# Patient Record
Sex: MALE | Race: WHITE | NOT HISPANIC OR LATINO | Employment: OTHER | URBAN - METROPOLITAN AREA
[De-identification: names, ages, dates, MRNs, and addresses within clinical notes are randomized per-mention and may not be internally consistent; named-entity substitution may affect disease eponyms.]

---

## 2017-02-06 ENCOUNTER — TELEPHONE (OUTPATIENT)
Dept: FAMILY MEDICINE | Facility: CLINIC | Age: 72
End: 2017-02-06

## 2017-02-06 DIAGNOSIS — I10 BENIGN ESSENTIAL HYPERTENSION: ICD-10-CM

## 2017-02-06 DIAGNOSIS — N18.1 CKD (CHRONIC KIDNEY DISEASE) STAGE 1, GFR 90 ML/MIN OR GREATER: Primary | ICD-10-CM

## 2017-02-07 RX ORDER — AMLODIPINE BESYLATE 5 MG/1
10 TABLET ORAL DAILY
Qty: 180 TABLET | Refills: 1 | Status: SHIPPED | OUTPATIENT
Start: 2017-02-07 | End: 2017-05-08

## 2017-02-07 RX ORDER — LISINOPRIL 40 MG/1
80 TABLET ORAL DAILY
Qty: 180 TABLET | Refills: 1 | Status: SHIPPED | OUTPATIENT
Start: 2017-02-07 | End: 2017-07-21

## 2017-03-14 ENCOUNTER — MEDICAL CORRESPONDENCE (OUTPATIENT)
Dept: HEALTH INFORMATION MANAGEMENT | Facility: CLINIC | Age: 72
End: 2017-03-14

## 2017-03-14 ENCOUNTER — TRANSFERRED RECORDS (OUTPATIENT)
Dept: HEALTH INFORMATION MANAGEMENT | Facility: CLINIC | Age: 72
End: 2017-03-14

## 2017-03-17 ENCOUNTER — TRANSFERRED RECORDS (OUTPATIENT)
Dept: HEALTH INFORMATION MANAGEMENT | Facility: CLINIC | Age: 72
End: 2017-03-17

## 2017-03-27 ENCOUNTER — TELEPHONE (OUTPATIENT)
Dept: ALLERGY | Facility: CLINIC | Age: 72
End: 2017-03-27

## 2017-03-27 NOTE — TELEPHONE ENCOUNTER
Patients tree/weed allergy serum  3/23/17. Last injections 2016. Discarding allergy serum.     Verenice Trimble CMA, AAMA

## 2017-03-31 ENCOUNTER — DOCUMENTATION ONLY (OUTPATIENT)
Dept: LAB | Facility: CLINIC | Age: 72
End: 2017-03-31

## 2017-03-31 DIAGNOSIS — E11.21 TYPE 2 DIABETES MELLITUS WITH DIABETIC NEPHROPATHY (H): Primary | ICD-10-CM

## 2017-03-31 DIAGNOSIS — E11.21 TYPE 2 DIABETES MELLITUS WITH DIABETIC NEPHROPATHY, WITHOUT LONG-TERM CURRENT USE OF INSULIN (H): ICD-10-CM

## 2017-04-03 DIAGNOSIS — E11.21 TYPE 2 DIABETES MELLITUS WITH DIABETIC NEPHROPATHY, WITHOUT LONG-TERM CURRENT USE OF INSULIN (H): ICD-10-CM

## 2017-04-03 LAB — HBA1C MFR BLD: 5.8 % (ref 4.3–6)

## 2017-04-03 PROCEDURE — 36415 COLL VENOUS BLD VENIPUNCTURE: CPT | Performed by: FAMILY MEDICINE

## 2017-04-03 PROCEDURE — 83036 HEMOGLOBIN GLYCOSYLATED A1C: CPT | Performed by: FAMILY MEDICINE

## 2017-04-05 ENCOUNTER — OFFICE VISIT (OUTPATIENT)
Dept: FAMILY MEDICINE | Facility: CLINIC | Age: 72
End: 2017-04-05
Payer: COMMERCIAL

## 2017-04-05 VITALS
SYSTOLIC BLOOD PRESSURE: 148 MMHG | HEIGHT: 66 IN | RESPIRATION RATE: 16 BRPM | DIASTOLIC BLOOD PRESSURE: 81 MMHG | HEART RATE: 76 BPM | WEIGHT: 186 LBS | BODY MASS INDEX: 29.89 KG/M2

## 2017-04-05 DIAGNOSIS — E11.21 TYPE 2 DIABETES MELLITUS WITH DIABETIC NEPHROPATHY, WITH LONG-TERM CURRENT USE OF INSULIN (H): ICD-10-CM

## 2017-04-05 DIAGNOSIS — Z79.4 TYPE 2 DIABETES MELLITUS WITH DIABETIC NEPHROPATHY, WITH LONG-TERM CURRENT USE OF INSULIN (H): ICD-10-CM

## 2017-04-05 LAB
ANION GAP SERPL CALCULATED.3IONS-SCNC: 6 MMOL/L (ref 3–14)
BUN SERPL-MCNC: 14 MG/DL (ref 7–30)
CALCIUM SERPL-MCNC: 9.4 MG/DL (ref 8.5–10.1)
CHLORIDE SERPL-SCNC: 91 MMOL/L (ref 94–109)
CO2 SERPL-SCNC: 29 MMOL/L (ref 20–32)
CREAT SERPL-MCNC: 1.03 MG/DL (ref 0.66–1.25)
GFR SERPL CREATININE-BSD FRML MDRD: 71 ML/MIN/1.7M2
GLUCOSE SERPL-MCNC: 306 MG/DL (ref 70–99)
POTASSIUM SERPL-SCNC: 5 MMOL/L (ref 3.4–5.3)
SODIUM SERPL-SCNC: 126 MMOL/L (ref 133–144)

## 2017-04-05 PROCEDURE — 36415 COLL VENOUS BLD VENIPUNCTURE: CPT | Performed by: FAMILY MEDICINE

## 2017-04-05 PROCEDURE — 99213 OFFICE O/P EST LOW 20 MIN: CPT | Performed by: FAMILY MEDICINE

## 2017-04-05 PROCEDURE — 86803 HEPATITIS C AB TEST: CPT | Performed by: FAMILY MEDICINE

## 2017-04-05 PROCEDURE — 80048 BASIC METABOLIC PNL TOTAL CA: CPT | Performed by: FAMILY MEDICINE

## 2017-04-05 NOTE — PATIENT INSTRUCTIONS
1. Stay off the diuretic for now.    2. bp goal at 140/80    3 I will on labs    4. Come back in 6 months or before if needed.

## 2017-04-05 NOTE — NURSING NOTE
"Chief Complaint   Patient presents with     Diabetes     Checking blood sugars running about 120-130     Blood Draw     Patient would like to get his sodium checked and Hep C checked.       Initial BP (!) 141/25 (BP Location: Right arm)  Pulse 76  Resp 16  Ht 5' 6\" (1.676 m)  Wt 186 lb (84.4 kg)  BMI 30.02 kg/m2 Estimated body mass index is 30.02 kg/(m^2) as calculated from the following:    Height as of this encounter: 5' 6\" (1.676 m).    Weight as of this encounter: 186 lb (84.4 kg).  Medication Reconciliation: complete    "

## 2017-04-05 NOTE — MR AVS SNAPSHOT
After Visit Summary   4/5/2017    Sae Milligan    MRN: [id number]           Patient Information     Date Of Birth          1945        Visit Information        Provider Department      4/5/2017 9:00 AM Kelvin Keenan MD [facility]        Today's Diagnoses     Type 2 diabetes mellitus with diabetic nephropathy, with long-term current use of insulin (H)          Care Instructions    1. Stay off the diuretic for now.    2. bp goal at 140/80    3 I will on labs    4. Come back in 6 months or before if needed.         Follow-ups after your visit        Who to contact     If you have questions or need follow up information about today's clinic visit or your schedule please contact [facility] directly at [phone].  Normal or non-critical lab and imaging results will be communicated to you by MyChart, letter or phone within 4 business days after the clinic has received the results. If you do not hear from us within 7 days, please contact the clinic through [facility] or phone. If you have a critical or abnormal lab result, we will notify you by phone as soon as possible.  Submit refill requests through [facility] or call your pharmacy and they will forward the refill request to us. Please allow 3 business days for your refill to be completed.          Additional Information About Your Visit        MyChart Information     [facility] gives you secure access to your electronic health record. If you see a primary care provider, you can also send messages to your care team and make appointments. If you have questions, please call your primary care clinic.  If you do not have a primary care provider, please call [phone] and they will assist you.        Care EveryWhere ID     This is your Care EveryWhere ID. This could be used by other organizations to access your [place] medical records  [id number]        Your Vitals Were     Pulse Respirations Height BMI  "(Body Mass Index)          76 16 5' 6\" (1.676 m) 30.02 kg/m2         Blood Pressure from Last 3 Encounters:   04/05/17 (!) 141/25   12/16/16 119/68   12/01/16 130/72    Weight from Last 3 Encounters:   04/05/17 186 lb (84.4 kg)   12/16/16 183 lb 12.8 oz (83.4 kg)   12/01/16 183 lb (83 kg)              We Performed the Following     Basic metabolic panel     Hepatitis C antibody          Today's Medication Changes          These changes are accurate as of: 4/5/17  9:29 AM.  If you have any questions, ask your nurse or doctor.               These medicines have changed or have updated prescriptions.        Dose/Directions    blood glucose monitoring test strip   Commonly known as:  no brand specified   This may have changed:  when to take this   Used for:  Type 2 diabetes mellitus with diabetic nephropathy, with long-term current use of insulin (H)   Changed by:  Kelvin Keenan MD        Dose:  1 strip   1 strip by In Vitro route 2 times daily Test 1x daily. Accu-Chek Marlene test strips   Quantity:  2 Box   Refills:  3            Where to get your medicines      These medications were sent to [facility] - [facility] [address]  [address], [facility] [address]     Phone:  [phone]     blood glucose monitoring test strip                Primary Care Provider Office Phone # Fax #    Kelvin Keenan [phone]       [address] [address]        Thank you!     Thank you for choosing [facility]  for your care. Our goal is always to provide you with excellent care. Hearing back from our patients is one way we can continue to improve our services. Please take a few minutes to complete the written survey that you may receive in the mail after your visit with us. Thank you!             Your Updated Medication List - Protect others around you: Learn how to safely use, store and " throw away your medicines at www.disposemymeds.org.          This list is accurate as of: 4/5/17  9:29 AM.  Always use your most recent med list.                   Brand Name Dispense Instructions for use    ALLERGEN IMMUNOTHERAPY PRESCRIPTION     5 mL    Mix #3  Tree , [place], [place]  GLY1:20 w/v, HS  0.3ml Birch Mix GLY1:20 w/v, HS  0.3ml [place] GLY1:20 w/v, HS  0.3ml Elm, American GLY1:20 w/v, HS  0.3ml [place], Black GLY1:20 w/v 0.3ml [place] GLY 1:20 w/v, HS 0.3ml [place] GLY 1:20 w/v, ALK 0.3ml [place]-Povertyweed GLY 1:20 w/v, HS 0.3ml Nettle GLY 1:20 w/v, HS 0.3ml Ragweed, Short GLY 1:20 w/v HS 0.3ml Russian Thistle GLY 1:20 w/v, HS 0.3ml Diluent: HSAqs to 5ml       amLODIPine 5 MG tablet    NORVASC    180 tablet    Take 2 tablets (10 mg) by mouth daily       aspirin 81 MG tablet      Take 1 tablet by mouth daily.       atorvastatin 20 MG tablet    LIPITOR    90 tablet    Take 1 tablet (20 mg) by mouth daily       blood glucose monitoring test strip    no brand specified    2 Box    1 strip by In Vitro route 2 times daily Test 1x daily. Accu-Chek Marlene test strips       calcium carb 1250 mg (500 mg [place])/vitamin D 200 unit 500-200 MG-UNIT per tablet    OSCAL with D     Take one tablet twice daily with food       chlorthalidone 25 MG tablet    HYGROTON    90 tablet    Take 1 tablet (25 mg) by mouth daily       EPINEPHrine 0.3 MG/0.3ML injection     0.6 mL    Inject 0.3 mLs (0.3 mg) into the muscle as needed for anaphylaxis       glipiZIDE 5 MG 24 hr tablet    glipiZIDE XL    90 tablet    Take 1 tablet (5 mg) by mouth daily       HIGH POTENCY IRON PO      Take 1 tablet by mouth daily.       lisinopril 40 MG tablet    PRINIVIL/ZESTRIL    180 tablet    Take 2 tablets (80 mg) by mouth daily       metFORMIN 500 MG tablet    GLUCOPHAGE    360 tablet    TAKE TWO TABLETS TWICE DAILY, WITH FOOD.       tamsulosin 0.4 MG capsule    FLOMAX     Take 0.4 mg by mouth daily       VITAMIN B COMPLEX PO       Take 1 tablet by mouth daily.       vitamin D 1000 UNITS capsule      Take 1 capsule by mouth daily.

## 2017-04-05 NOTE — PROGRESS NOTES
SUBJECTIVE:                                                    Sae Milligan is a 71 year old male who presents to clinic today for the following health issues:        Diabetes Follow-up    Patient is checking blood sugars: once daily.  Results are as follows:         am - 120-130    Diabetic concerns: None     Symptoms of hypoglycemia (low blood sugar): none     Paresthesias (numbness or burning in feet) or sores: No     Date of last diabetic eye exam: Up to date     Hyperlipidemia Follow-Up      Rate your low fat/cholesterol diet?: good    Taking statin?  Yes, no muscle aches from statin    Other lipid medications/supplements?:  none     Hypertension Follow-up      Outpatient blood pressures are being checked at home.  Results are running a little high at times 140/70.    Low Salt Diet: no added salt         Amount of exercise or physical activity: 2-3 days/week for an average of 15-30 minutes    Problems taking medications regularly: No    Medication side effects: none    Diet: low fat/cholesterol          Problem list and histories reviewed & adjusted, as indicated.  Additional history: as documented    Patient Active Problem List   Diagnosis     Type 2 diabetes mellitus with diabetic nephropathy (H)     Hyperlipidemia LDL goal <100     Other chronic allergic conjunctivitis     DJD (degenerative joint disease)     Health Care Home     Allergic rhinitis     CKD (chronic kidney disease) stage 1, GFR 90 ml/min or greater     Squamous cell carcinoma (H)     History of squamous cell carcinoma     SK (seborrheic keratosis)     Lentigo     Family history of prostate cancer     Restless leg syndrome     Anxiety     Past Surgical History:   Procedure Laterality Date     ARTHROPLASTY KNEE  2/10/2011    ARTHROPLASTY KNEE performed by DARLING FARRELL at [facility]     MOHS MICROGRAPHIC PROCEDURE  july 2012    Sq. cell left helix     ORTHOPEDIC SURGERY       SURGICAL HISTORY OF -   12/2003    Cervical spine fx       Social  History   Substance Use Topics     Smoking status: Former Smoker     Quit date: 3/14/1990     Smokeless tobacco: Never Used     Alcohol use Yes      Comment: occasional     Family History   Problem Relation Age of Onset     Hypertension Mother      CEREBROVASCULAR DISEASE Mother      Hypertension Father      CEREBROVASCULAR DISEASE Father      DIABETES Brother      Prostate Cancer Brother      Prostate Cancer Brother          Current Outpatient Prescriptions   Medication Sig Dispense Refill     blood glucose monitoring (NO BRAND SPECIFIED) test strip 1 strip by In Vitro route 2 times daily Test 1x daily. Accu-Chek Marlene test strips 2 Box 3     lisinopril (PRINIVIL/ZESTRIL) 40 MG tablet Take 2 tablets (80 mg) by mouth daily 180 tablet 1     amLODIPine (NORVASC) 5 MG tablet Take 2 tablets (10 mg) by mouth daily 180 tablet 1     tamsulosin (FLOMAX) 0.4 MG capsule Take 0.4 mg by mouth daily       glipiZIDE (GLIPIZIDE XL) 5 MG 24 hr tablet Take 1 tablet (5 mg) by mouth daily 90 tablet 3     atorvastatin (LIPITOR) 20 MG tablet Take 1 tablet (20 mg) by mouth daily 90 tablet 3     metFORMIN (GLUCOPHAGE) 500 MG tablet TAKE TWO TABLETS TWICE DAILY, WITH FOOD. 360 tablet 3     aspirin 81 MG tablet Take 1 tablet by mouth daily.       Cholecalciferol (VITAMIN D) 1000 UNIT capsule Take 1 capsule by mouth daily.       B Complex Vitamins (VITAMIN B COMPLEX PO) Take 1 tablet by mouth daily.       Ferrous Sulfate (HIGH POTENCY IRON PO) Take 1 tablet by mouth daily.       CALCIUM CARBONATE-VITAMIN D 500-200 MG-UNIT OR TABS Take one tablet twice daily with food       chlorthalidone (HYGROTON) 25 MG tablet Take 1 tablet (25 mg) by mouth daily 90 tablet 3     [DISCONTINUED] blood glucose monitoring (NO BRAND SPECIFIED) test strip 1 strip by In Vitro route daily Test 1x daily. Accu-Chek Marlene test strips 1 Box 3     EPINEPHrine (EPIPEN) 0.3 MG/0.3ML injection Inject 0.3 mLs (0.3 mg) into the muscle as needed for anaphylaxis (Patient not  taking: Reported on 4/5/2017) 0.6 mL 2     ORDER FOR ALLERGEN IMMUNOTHERAPY Mix #3  Tree , [place]  [place]t, White  GLY1:20 w/v, HS  0.3ml  Birch Mix GLY1:20 w/v, HS  0.3ml  [place] GLY1:20 w/v, HS  0.3ml  Elm, American GLY1:20 w/v, HS  0.3ml  [place], Black GLY1:20 w/v 0.3ml  [place]ock[place] GLY 1:20 w/v, HS 0.3ml  Lamb's Quarters GLY 1:20 w/v, ALK 0.3ml  Marshelder-Povertyweed GLY 1:20 w/v, HS 0.3ml  Nettle GLY 1:20 w/v, HS 0.3ml  Ragweed, Short GLY 1:20 w/v HS 0.3ml  Russian Thistle GLY 1:20 w/v, HS 0.3ml  Diluent: HSAqs to 5ml (Patient not taking: Reported on 4/5/2017) 5 mL PRN     Recent Labs   Lab Test  04/03/17   0919  09/29/16   0814  09/28/16   0432  04/15/16   0811  10/06/15   0743   06/11/14   0740   11/06/13   0832   04/25/13   0937   01/10/12   0925   A1C  5.8  5.4   --   6.2*  6.1*   < >  5.8   --   6.1*   --   6.4*   < >  6.1*   LDL   --   30   --    --   50   --   71   --   91   < >  112   < >  92   HDL   --   68   --    --   53   --   50   --   47   < >  55   < >  50   TRIG   --   57   --    --   67   --   47   --   80   < >  99   < >  75   ALT   --    --    --    --    --    --    --    --   41   --   39   --   31   CR   --    --   1.06   --   0.88   < >  0.92   < >  0.89   --   0.89   < >  0.86   GFRESTIMATED   --    --   69   --   85   < >  82   < >  85   --   85   < >  89   GFRESTBLACK   --    --   83   --   >90   GFR Calc     < >  >90   < >  >90   --   >90   < >  >90   POTASSIUM   --    --   4.6   --   5.0   < >  5.5*   < >  5.3   --   4.9   < >  4.9   TSH   --   2.82   --    --    --    --   1.61   --    --    --    --    --   1.41    < > = values in this interval not displayed.      BP Readings from Last 3 Encounters:   04/05/17 148/81   12/16/16 119/68   12/01/16 130/72    Wt Readings from Last 3 Encounters:   04/05/17 186 lb (84.4 kg)   12/16/16 183 lb 12.8 oz (83.4 kg)   12/01/16 183 lb (83 kg)                    Reviewed and updated as needed this visit by clinical staff  Tobacco   "Allergies  Meds  Med Hx  Surg Hx  Fam Hx  Soc Hx      Reviewed and updated as needed this visit by Provider         ROS:  C: NEGATIVE for fever, chills, change in weight  E/M: NEGATIVE for ear, mouth and throat problems  R: NEGATIVE for significant cough or SOB  CV: NEGATIVE for chest pain, palpitations or peripheral edema    OBJECTIVE:                                                    /81  Pulse 76  Resp 16  Ht 5' 6\" (1.676 m)  Wt 186 lb (84.4 kg)  BMI 30.02 kg/m2  Body mass index is 30.02 kg/(m^2).  GENERAL: healthy, alert and no distress  NECK: no adenopathy, no asymmetry, masses, or scars and thyroid normal to palpation  RESP: lungs clear to auscultation - no rales, rhonchi or wheezes  CV: regular rate and rhythm, normal S1 S2, no S3 or S4, no murmur, click or rub, no peripheral edema and peripheral pulses strong  ABDOMEN: soft, nontender, no hepatosplenomegaly, no masses and bowel sounds normal  MS: no gross musculoskeletal defects noted, no edema         ASSESSMENT/PLAN:                                                            1. Type 2 diabetes mellitus with diabetic nephropathy, with long-term current use of insulin (H)  Will check Na    - Hepatitis C antibody  - Basic metabolic panel  - blood glucose monitoring (NO BRAND SPECIFIED) test strip; 1 strip by In Vitro route 2 times daily Test 1x daily. Accu-Chek Marlene test strips  Dispense: 2 Box; Refill: 3    ASSESSMENT/PLAN:      ICD-10-CM    1. Type 2 diabetes mellitus with diabetic nephropathy, with long-term current use of insulin (H) E11.21 Hepatitis C antibody    Z79.4 Basic metabolic panel     blood glucose monitoring (NO BRAND SPECIFIED) test strip       Patient Instructions   1. Stay off the diuretic for now.    2. bp goal at 140/80    3 I will on labs    4. Come back in 6 months or before if needed.           Kelvin Keenan MD  [facility]    "

## 2017-04-06 DIAGNOSIS — Z79.4 TYPE 2 DIABETES MELLITUS WITH DIABETIC NEPHROPATHY, WITH LONG-TERM CURRENT USE OF INSULIN (H): ICD-10-CM

## 2017-04-06 DIAGNOSIS — E11.21 TYPE 2 DIABETES MELLITUS WITH DIABETIC NEPHROPATHY, WITH LONG-TERM CURRENT USE OF INSULIN (H): ICD-10-CM

## 2017-04-06 LAB — HCV AB SERPL QL IA: NORMAL

## 2017-04-07 NOTE — TELEPHONE ENCOUNTER
Lab Results   Component Value Date    A1C 5.8 04/03/2017    A1C 5.4 09/29/2016    A1C 6.2 04/15/2016    A1C 6.1 10/06/2015    A1C 6.0 05/08/2015     Rec'd fax from pharm requesting direction clarification.   Per 04-05-17 OV- tests daily.  Refill re- sent.  NATALIE Calderon RN

## 2017-04-10 ENCOUNTER — TELEPHONE (OUTPATIENT)
Dept: FAMILY MEDICINE | Facility: CLINIC | Age: 72
End: 2017-04-10

## 2017-04-12 ENCOUNTER — DOCUMENTATION ONLY (OUTPATIENT)
Dept: LAB | Facility: CLINIC | Age: 72
End: 2017-04-12

## 2017-04-12 DIAGNOSIS — E87.1 LOW SODIUM LEVELS: ICD-10-CM

## 2017-04-12 DIAGNOSIS — E87.1 LOW SODIUM LEVELS: Primary | ICD-10-CM

## 2017-04-12 LAB
ANION GAP SERPL CALCULATED.3IONS-SCNC: 8 MMOL/L (ref 3–14)
BUN SERPL-MCNC: 15 MG/DL (ref 7–30)
CALCIUM SERPL-MCNC: 8.9 MG/DL (ref 8.5–10.1)
CHLORIDE SERPL-SCNC: 100 MMOL/L (ref 94–109)
CO2 SERPL-SCNC: 25 MMOL/L (ref 20–32)
CREAT SERPL-MCNC: 1.13 MG/DL (ref 0.66–1.25)
GFR SERPL CREATININE-BSD FRML MDRD: 64 ML/MIN/1.7M2
GLUCOSE SERPL-MCNC: 84 MG/DL (ref 70–99)
POTASSIUM SERPL-SCNC: 4.1 MMOL/L (ref 3.4–5.3)
SODIUM SERPL-SCNC: 133 MMOL/L (ref 133–144)

## 2017-04-12 PROCEDURE — 36415 COLL VENOUS BLD VENIPUNCTURE: CPT | Performed by: FAMILY MEDICINE

## 2017-04-12 PROCEDURE — 80048 BASIC METABOLIC PNL TOTAL CA: CPT | Performed by: FAMILY MEDICINE

## 2017-04-12 NOTE — PROGRESS NOTES
Patient is coming for previsit labs per Dr. Keenan, please place future orders ASAP (or contact patient if labs aren't needed.) Thanks!

## 2017-04-13 ENCOUNTER — OFFICE VISIT (OUTPATIENT)
Dept: FAMILY MEDICINE | Facility: CLINIC | Age: 72
End: 2017-04-13
Payer: COMMERCIAL

## 2017-04-13 VITALS
SYSTOLIC BLOOD PRESSURE: 130 MMHG | WEIGHT: 185 LBS | BODY MASS INDEX: 29.86 KG/M2 | HEART RATE: 72 BPM | DIASTOLIC BLOOD PRESSURE: 64 MMHG

## 2017-04-13 DIAGNOSIS — E87.1 HYPONATREMIA: Primary | ICD-10-CM

## 2017-04-13 PROCEDURE — 99213 OFFICE O/P EST LOW 20 MIN: CPT | Performed by: FAMILY MEDICINE

## 2017-04-13 NOTE — NURSING NOTE
"Chief Complaint   Patient presents with     Lab Result Notice     discuss results - lab draw yesterday       Initial /64  Pulse 72  Wt 185 lb (83.9 kg)  BMI 29.86 kg/m2 Estimated body mass index is 29.86 kg/(m^2) as calculated from the following:    Height as of 4/5/17: 5' 6\" (1.676 m).    Weight as of this encounter: 185 lb (83.9 kg).  Medication Reconciliation: complete    Health Maintenance that is potentially due pending provider review:  Colonoscopy/FIT    Gave pt phone number/pended order to schedule mammo and/or colonoscopy(or FIT)      "

## 2017-04-13 NOTE — PROGRESS NOTES
SUBJECTIVE:                                                    Sae Milligan is a 71 year old male who presents to clinic today for the following health issues: Sae comes in today for the follow up of his hyponatremia. He is doing quite well [facility] and currently today his sodium has come up to 133. After review of his chart it becomes apparent that the hyponatremia is most likely due to the [place] we stopped the same.      Discuss recent lab results  Results for orders placed or performed in visit on 04/12/17   **Basic metabolic panel FUTURE 1yr   Result Value Ref Range    Sodium 133 133 - 144 mmol/L    Potassium 4.1 3.4 - 5.3 mmol/L    Chloride 100 94 - 109 mmol/L    Carbon Dioxide 25 20 - 32 mmol/L    Anion Gap 8 3 - 14 mmol/L    Glucose 84 70 - 99 mg/dL    Urea Nitrogen 15 7 - 30 mg/dL    Creatinine 1.13 0.66 - 1.25 mg/dL    GFR Estimate 64 >60 mL/min/1.7m2    GFR Estimate If Black 77 >60 mL/min/1.7m2    Calcium 8.9 8.5 - 10.1 mg/dL             Problem list and histories reviewed & adjusted, as indicated.  Additional history:         Reviewed and updated as needed this visit by clinical staff  Tobacco  Allergies       Reviewed and updated as needed this visit by Provider         ROS:      OBJECTIVE:                                                    /64  Pulse 72  Wt 185 lb (83.9 kg)  BMI 29.86 kg/m2  Body mass index is 29.86 kg/(m^2).  GENERAL: healthy, alert and no distress  NECK: no adenopathy, no asymmetry, masses, or scars and thyroid normal to palpation  RESP: lungs clear to auscultation - no rales, rhonchi or wheezes  CV: regular rate and rhythm, normal S1 S2, no S3 or S4, no murmur, click or rub, no peripheral edema and peripheral pulses strong  ABDOMEN: soft, nontender, no hepatosplenomegaly, no masses and bowel sounds normal  MS: no gross musculoskeletal defects noted, no edema         ASSESSMENT/PLAN:                                                             1. Hyponatremia    - Basic metabolic panel; Future    ASSESSMENT/PLAN:      ICD-10-CM    1. Hyponatremia E87.1 Basic metabolic panel       Patient Instructions   1. Watch home BP readings    2. Goal is around 140/80    3. Come back in septh after labs.          Kelvin Keenan MD  [facility]

## 2017-04-14 ASSESSMENT — ASTHMA QUESTIONNAIRES: ACT_TOTALSCORE: 25

## 2017-04-26 ENCOUNTER — RADIANT APPOINTMENT (OUTPATIENT)
Dept: GENERAL RADIOLOGY | Facility: CLINIC | Age: 72
End: 2017-04-26
Attending: NURSE PRACTITIONER
Payer: COMMERCIAL

## 2017-04-26 ENCOUNTER — OFFICE VISIT (OUTPATIENT)
Dept: URGENT CARE | Facility: URGENT CARE | Age: 72
End: 2017-04-26
Payer: COMMERCIAL

## 2017-04-26 VITALS
HEART RATE: 82 BPM | BODY MASS INDEX: 30.41 KG/M2 | SYSTOLIC BLOOD PRESSURE: 142 MMHG | DIASTOLIC BLOOD PRESSURE: 75 MMHG | OXYGEN SATURATION: 99 % | TEMPERATURE: 96.4 F | WEIGHT: 188.4 LBS

## 2017-04-26 DIAGNOSIS — R05.9 COUGH: ICD-10-CM

## 2017-04-26 DIAGNOSIS — J20.9 ACUTE BRONCHITIS, UNSPECIFIED ORGANISM: Primary | ICD-10-CM

## 2017-04-26 PROCEDURE — 71020 XR CHEST 2 VW: CPT

## 2017-04-26 PROCEDURE — 94640 AIRWAY INHALATION TREATMENT: CPT | Performed by: NURSE PRACTITIONER

## 2017-04-26 PROCEDURE — 99214 OFFICE O/P EST MOD 30 MIN: CPT | Mod: 25 | Performed by: NURSE PRACTITIONER

## 2017-04-26 RX ORDER — ALBUTEROL SULFATE 90 UG/1
2 AEROSOL, METERED RESPIRATORY (INHALATION) EVERY 6 HOURS PRN
Qty: 1 INHALER | Refills: 0 | Status: SHIPPED | OUTPATIENT
Start: 2017-04-26 | End: 2017-06-06

## 2017-04-26 RX ORDER — ALBUTEROL SULFATE 0.83 MG/ML
1 SOLUTION RESPIRATORY (INHALATION) ONCE
Qty: 3 ML | Refills: 0 | COMMUNITY
End: 2017-10-14

## 2017-04-26 RX ORDER — PREDNISONE 20 MG/1
TABLET ORAL
Qty: 10 TABLET | Refills: 0 | Status: SHIPPED | OUTPATIENT
Start: 2017-04-26 | End: 2017-05-26

## 2017-04-26 RX ORDER — BENZONATATE 100 MG/1
100 CAPSULE ORAL 3 TIMES DAILY PRN
Qty: 42 CAPSULE | Refills: 0 | Status: SHIPPED | OUTPATIENT
Start: 2017-04-26 | End: 2017-11-21

## 2017-04-26 NOTE — PROGRESS NOTES
SUBJECTIVE:  Sae Milligan is a 71 year old male who presents to the clinic today with a chief complaint of cough for 4 day(s).  His cough is described as nonproductive.    The patient's symptoms are moderate and worsening.  Associated symptoms include congestion. The patient's symptoms are exacerbated by no particular triggers  Patient has been using OTC medications  to improve symptoms.    Past Medical History:   Diagnosis Date     Diabetes mellitus (H)      Squamous cell carcinoma (H)        Social History   Substance Use Topics     Smoking status: Former Smoker     Quit date: 3/14/1990     Smokeless tobacco: Never Used     Alcohol use Yes      Comment: occasional         ROS  CONSTITUTIONAL:NEGATIVE for fever, chills, change in weight  INTEGUMENTARY/SKIN: NEGATIVE for worrisome rashes, moles or lesions  EYES: NEGATIVE for vision changes or irritation  ENT/MOUTH: NEGATIVE for ear, mouth and throat problems  RESP:See above   CV: NEGATIVE for chest pain, palpitations or peripheral edema  GI: NEGATIVE for nausea, abdominal pain, heartburn, or change in bowel habits  MUSCULOSKELETAL: NEGATIVE for significant arthralgias or myalgia  NEURO: NEGATIVE for weakness, dizziness or paresthesias    OBJECTIVE:  /75  Pulse 82  Temp 96.4  F (35.8  C) (Tympanic)  Wt 188 lb 6.4 oz (85.5 kg)  SpO2 99%  BMI 30.41 kg/m2  GENERAL APPEARANCE: healthy, alert and no distress  EYES: EOMI,  PERRL, conjunctiva clear  HENT: ear canals and TM's normal.  Nose and mouth without ulcers, erythema or lesions  NECK: supple, nontender, no lymphadenopathy  RESP: lungs clear to auscultation - no rales, rhonchi or wheezes  CV: regular rates and rhythm, normal S1 S2, no murmur noted  ABDOMEN:  soft, nontender, no HSM or masses and bowel sounds normal  NEURO: Normal strength and tone, sensory exam grossly normal,  normal speech and mentation  SKIN: no suspicious lesions or rashes    X-RAY:   XR CHEST 2 VW 4/26/2017 7:12 PM      HISTORY:  Cough         IMPRESSION: Left upper lobe calcified granuloma, unchanged from  6/9/2008. The lungs otherwise appear clear. No pleural effusions.     TAYLER SANTILLAN MD  ASSESSMENT:      ICD-10-CM    1. Acute bronchitis, unspecified organism J20.9 predniSONE (DELTASONE) 20 MG tablet     albuterol (PROAIR HFA/PROVENTIL HFA/VENTOLIN HFA) 108 (90 BASE) MCG/ACT Inhaler     benzonatate (TESSALON PERLES) 100 MG capsule   2. Cough R05 XR Chest 2 Views     INHALATION/NEBULIZER TREATMENT, INITIAL     ALBUTEROL UNIT DOSE, 1 MG       PLAN:  Patient was given an in office albuterol nebulizer treatment while in the office with clearing of their wheezing.   Patient Instructions   Use Medication as directed    Hydrate with fluids, rest, cool humidifier.  May use acetaminophen, ibuprofen prn.    For your Cough   The Buckwheat Honey Dose: Given   hour Prior to Bedtime  For children age under 1 year -Do not use due to botulism risk     2-5 years -  tsp (2.5 mL)    6-11 years -1 tsp (5 mL)    12-18 years -2 tsp (10 mL)     Guaifenesin     Adult dose -Not to exceed 2.4 g (2400mg) per day    Child age 6-12 years -100 mg every 4 hr, not to exceed 1.2 g (1200mg) per day     Pediatric, 2-6 years -50 mg every 4 hr as needed, not to exceed 600 mg per day    Cough medications is not recommended in children under 2 years.  With use of cough medications have combination medications be aware of products in the cough medication you are using to avoid overdose    Follow up with PCP in 2 weeks.    Go to Emergency Room if sx worsen or change, Shortness of breath, chest pain, persistent fevers, or painful breathing occur.     Patient voiced understanding of instructions given.          Bronchitis, Viral (Adult)    You have a viral bronchitis. Bronchitis is inflammation and swelling of the lining of the lungs. This is often caused by an infection. Symptoms include a dry, hacking cough that is worse at night. The cough may bring up yellow-green mucus.  You may also feel short of breath or wheeze. Other symptoms may include tiredness, chest discomfort, and chills.  Bronchitis that is caused by a virus is not treated with antibiotics. Instead, medicines may be given to help relieve symptoms. Symptoms can last up to 2 weeks, although the cough may last much longer.  This illness is contagious during the first few days and is spread through the air by coughing and sneezing, or by direct contact (touching the sick person and then touching your own eyes, nose, or mouth).  Most viral illnesses resolve within 10 to 14 days with rest and simple home remedies, although they may sometimes last for several weeks.  Home care    If symptoms are severe, rest at home for the first 2 to 3 days. When you go back to your usual activities, don't let yourself get too tired.    Do not smoke. Also avoid being exposed to secondhand smoke.    You may use over-the-counter medicine to control fever or pain, unless another pain medicine was prescribed. (Note: If you have chronic liver or kidney disease or have ever had a stomach ulcer or gastrointestinal bleeding, talk with your healthcare provider before using these medicines. Also talk to your provider if you are taking medicine to prevent blood clots.) Aspirin should never be given to anyone younger than 18 years of age who is ill with a viral infection or fever. It may cause severe liver or brain damage.    Your appetite may be poor, so a light diet is fine. Avoid dehydration by drinking 6 to 8 glasses of fluids per day (such as water, soft drinks, sports drinks, juices, tea, or soup). Extra fluids will help loosen secretions in the nose and lungs.    Over-the-counter cough, cold, and sore-throat medicines will not shorten the length of the illness, but they may help to reduce symptoms. (Note: Do not use decongestants if you have high blood pressure.)  Follow-up care  Follow up with your healthcare provider, or as advised.  If you had an  X-ray or ECG (electrocardiogram), a specialist will review it. You will be notified of any new findings that may affect your care.  Note: If you are age 65 or older, or if you have a chronic lung disease or condition that affects your immune system, or you smoke, talk to your healthcare provider about having pneumococcal vaccinations and a yearly influenza vaccination (flu shot).  When to seek medical advice   Call your healthcare provider right away if any of these occur:    Fever of 100.4 F (38 C) or higher    Coughing up increased amounts of colored sputum    Weakness, drowsiness, headache, facial pain, ear pain, or a stiff neck  Call 911, or get immediate medical care  Contact emergency services right away if any of these occur:    Coughing up blood    Worsening weakness, drowsiness, headache, or stiff neck    Trouble breathing, wheezing, or pain with breathing    2716-2453 [employer]. [address] [place] [address]. All rights reserved. This information is not intended as a substitute for professional medical care. Always follow your healthcare professional's instructions.          ANGEL Singh CNP

## 2017-04-26 NOTE — MR AVS SNAPSHOT
After Visit Summary   4/26/2017    Sae Milligan    MRN: [id number]           Patient Information     Date Of Birth          1945        Visit Information        Provider Department      4/26/2017 6:30 PM Dionna Velez APRN [facility]        Today's Diagnoses     Cough    -  1    Acute bronchitis, unspecified organism          Care Instructions    Use Medication as directed    Hydrate with fluids, rest, cool humidifier.  May use acetaminophen, ibuprofen prn.    For your Cough   The Buckwheat Honey Dose: Given   hour Prior to Bedtime  For children age under 1 year -Do not use due to botulism risk     2-5 years -  tsp (2.5 mL)    6-11 years -1 tsp (5 mL)    12-18 years -2 tsp (10 mL)     Guaifenesin     Adult dose -Not to exceed 2.4 g (2400mg) per day    Child age 6-12 years -100 mg every 4 hr, not to exceed 1.2 g (1200mg) per day     Pediatric, 2-6 years -50 mg every 4 hr as needed, not to exceed 600 mg per day    Cough medications is not recommended in children under 2 years.  With use of cough medications have combination medications be aware of products in the cough medication you are using to avoid overdose    Follow up with PCP in 2 weeks.    Go to Emergency Room if sx worsen or change, Shortness of breath, chest pain, persistent fevers, or painful breathing occur.     Patient voiced understanding of instructions given.          Bronchitis, Viral (Adult)    You have a viral bronchitis. Bronchitis is inflammation and swelling of the lining of the lungs. This is often caused by an infection. Symptoms include a dry, hacking cough that is worse at night. The cough may bring up yellow-green mucus. You may also feel short of breath or wheeze. Other symptoms may include tiredness, chest discomfort, and chills.  Bronchitis that is caused by a virus is not treated with antibiotics. Instead, medicines may be given to help relieve symptoms. Symptoms can last up to  2 weeks, although the cough may last much longer.  This illness is contagious during the first few days and is spread through the air by coughing and sneezing, or by direct contact (touching the sick person and then touching your own eyes, nose, or mouth).  Most viral illnesses resolve within 10 to 14 days with rest and simple home remedies, although they may sometimes last for several weeks.  Home care    If symptoms are severe, rest at home for the first 2 to 3 days. When you go back to your usual activities, don't let yourself get too tired.    Do not smoke. Also avoid being exposed to secondhand smoke.    You may use over-the-counter medicine to control fever or pain, unless another pain medicine was prescribed. (Note: If you have chronic liver or kidney disease or have ever had a stomach ulcer or gastrointestinal bleeding, talk with your healthcare provider before using these medicines. Also talk to your provider if you are taking medicine to prevent blood clots.) Aspirin should never be given to anyone younger than 18 years of age who is ill with a viral infection or fever. It may cause severe liver or brain damage.    Your appetite may be poor, so a light diet is fine. Avoid dehydration by drinking 6 to 8 glasses of fluids per day (such as water, soft drinks, sports drinks, juices, tea, or soup). Extra fluids will help loosen secretions in the nose and lungs.    Over-the-counter cough, cold, and sore-throat medicines will not shorten the length of the illness, but they may help to reduce symptoms. (Note: Do not use decongestants if you have high blood pressure.)  Follow-up care  Follow up with your healthcare provider, or as advised.  If you had an X-ray or ECG (electrocardiogram), a specialist will review it. You will be notified of any new findings that may affect your care.  Note: If you are age 65 or older, or if you have a chronic lung disease or condition that affects your immune system, or you smoke,  talk to your healthcare provider about having pneumococcal vaccinations and a yearly influenza vaccination (flu shot).  When to seek medical advice   Call your healthcare provider right away if any of these occur:    Fever of 100.4 F (38 C) or higher    Coughing up increased amounts of colored sputum    Weakness, drowsiness, headache, facial pain, ear pain, or a stiff neck  Call 911, or get immediate medical care  Contact emergency services right away if any of these occur:    Coughing up blood    Worsening weakness, drowsiness, headache, or stiff neck    Trouble breathing, wheezing, or pain with breathing    [id number] [employer]. [address] [place] [address]. All rights reserved. This information is not intended as a substitute for professional medical care. Always follow your healthcare professional's instructions.              Follow-ups after your visit        Who to contact     If you have questions or need follow up information about today's clinic visit or your schedule please contact [facility] directly at [phone].  Normal or non-critical lab and imaging results will be communicated to you by [employer], letter or phone within 4 business days after the clinic has received the results. If you do not hear from us within 7 days, please contact the clinic through [employer] or phone. If you have a critical or abnormal lab result, we will notify you by phone as soon as possible.  Submit refill requests through [employer] or call your pharmacy and they will forward the refill request to us. Please allow 3 business days for your refill to be completed.          Additional Information About Your Visit        [employer] Information     [employer] gives you secure access to your electronic health record. If you see a primary care provider, you can also send messages to your care team and make appointments. If you have questions, please call your primary care clinic.   If you do not have a primary care provider, please call [phone] and they will assist you.        Care EveryWhere ID     This is your Care EveryWhere ID. This could be used by other organizations to access your [facility] medical records  [id number]        Your Vitals Were     Pulse Temperature Pulse Oximetry BMI (Body Mass Index)          82 96.4  F (35.8  C) (Tympanic) 99% 30.41 kg/m2         Blood Pressure from Last 3 Encounters:   04/26/17 142/75   04/13/17 130/64   04/05/17 148/81    Weight from Last 3 Encounters:   04/26/17 188 lb 6.4 oz (85.5 kg)   04/13/17 185 lb (83.9 kg)   04/05/17 186 lb (84.4 kg)              We Performed the Following     ALBUTEROL UNIT DOSE, 1 MG     INHALATION/NEBULIZER TREATMENT, INITIAL          Today's Medication Changes          These changes are accurate as of: 4/26/17  7:20 PM.  If you have any questions, ask your nurse or doctor.               Start taking these medicines.        Dose/Directions    benzonatate 100 MG capsule   Commonly known as:  TESSALON PERLES   Used for:  Acute bronchitis, unspecified organism   Started by:  Dionna Velez APRN CNP        Dose:  100 mg   Take 1 capsule (100 mg) by mouth 3 times daily as needed for cough   Quantity:  42 capsule   Refills:  0       predniSONE 20 MG tablet   Commonly known as:  DELTASONE   Used for:  Acute bronchitis, unspecified organism   Started by:  Dionna Velez APRN CNP        Take one tablet twice a day for 5 days.   Quantity:  10 tablet   Refills:  0         These medicines have changed or have updated prescriptions.        Dose/Directions    * albuterol (2.5 MG/3ML) 0.083% neb solution   This may have changed:  Another medication with the same name was added. Make sure you understand how and when to take each.   Changed by:  Dionna Velez APRN CNP        Dose:  1 vial   Take 1 vial (2.5 mg) by nebulization once   Quantity:  3 mL   Refills:  0       * albuterol 108 (90 BASE) MCG/ACT Inhaler   Commonly  known as:  PROAIR HFA/PROVENTIL HFA/VENTOLIN HFA   This may have changed:  You were already taking a medication with the same name, and this prescription was added. Make sure you understand how and when to take each.   Used for:  Acute bronchitis, unspecified organism   Changed by:  Dionna Velez APRN CNP        Dose:  2 puff   Inhale 2 puffs into the lungs every 6 hours as needed for shortness of breath / dyspnea or wheezing   Quantity:  1 Inhaler   Refills:  0       * Notice:  This list has 2 medication(s) that are the same as other medications prescribed for you. Read the directions carefully, and ask your doctor or other care provider to review them with you.         Where to get your medicines      These medications were sent to [id number] - [place], [place] - [address]  [address] [address]    Hours:  Closed 10-16-08 business to [facility] Phone:  [phone]     albuterol 108 (90 BASE) MCG/ACT Inhaler    benzonatate 100 MG capsule    predniSONE 20 MG tablet                Primary Care Provider Office Phone # Fax #    Kelvin Keenan [phone]       [address] [address]        Thank you!     Thank you for choosing [facility]  for your care. Our goal is always to provide you with excellent care. Hearing back from our patients is one way we can continue to improve our services. Please take a few minutes to complete the written survey that you may receive in the mail after your visit with us. Thank you!             Your Updated Medication List - Protect others around you: Learn how to safely use, store and throw away your medicines at www.disposemymeds.org.          This list is accurate as of: 4/26/17  7:20 PM.  Always use your most recent med list.                   Brand Name Dispense Instructions for use    * albuterol (2.5 MG/3ML) 0.083% neb solution      3 mL    Take 1 vial (2.5 mg) by nebulization once       * albuterol 108 (90 BASE) MCG/ACT Inhaler    PROAIR HFA/PROVENTIL HFA/VENTOLIN HFA    1 Inhaler    Inhale 2 puffs into the lungs every 6 hours as needed for shortness of breath / dyspnea or wheezing       ALLERGEN IMMUNOTHERAPY PRESCRIPTION     5 mL    Mix #3  Tree , [place], [place]  GLY1:20 w/v, HS  0.3ml Birch Mix GLY1:20 w/v, HS  0.3ml [place] GLY1:20 w/v, HS  0.3ml Elm, American GLY1:20 w/v, HS  0.3ml [place], Black GLY1:20 w/v 0.3ml [place] GLY 1:20 w/v, HS 0.3ml [place] GLY 1:20 w/v, ALK 0.3ml [place] GLY 1:20 w/v, HS 0.3ml Nettle GLY 1:20 w/v, HS 0.3ml Ragweed, Short GLY 1:20 w/v HS 0.3ml [place] Thistle GLY 1:20 w/v, HS 0.3ml Diluent: HSAqs to 5ml       amLODIPine 5 MG tablet    NORVASC    180 tablet    Take 2 tablets (10 mg) by mouth daily       aspirin 81 MG tablet      Take 1 tablet by mouth daily.       atorvastatin 20 MG tablet    LIPITOR    90 tablet    Take 1 tablet (20 mg) by mouth daily       benzonatate 100 MG capsule    TESSALON PERLES    42 capsule    Take 1 capsule (100 mg) by mouth 3 times daily as needed for cough       blood glucose monitoring test strip    no brand specified    2 Box    1 strip by In Vitro route daily Test 1x daily. Accu-Chek Marlene test strips       calcium carb 1250 mg (500 mg [place])/vitamin D 200 unit 500-200 MG-UNIT per tablet    OSCAL with D     Take one tablet twice daily with food       chlorthalidone 25 MG tablet    HYGROTON    90 tablet    Take 1 tablet (25 mg) by mouth daily       EPINEPHrine 0.3 MG/0.3ML injection     0.6 mL    Inject 0.3 mLs (0.3 mg) into the muscle as needed for anaphylaxis       Ferrous Gluconate 256 (28 FE) MG Tabs      Take 28 mg by mouth daily       glipiZIDE 5 MG 24 hr tablet    glipiZIDE XL    90 tablet    Take 1 tablet (5 mg) by mouth daily       lisinopril 40 MG tablet    PRINIVIL/ZESTRIL    180 tablet    Take 2 tablets (80 mg) by mouth daily        magnesium 500 MG Tabs      Take 500 mg by mouth daily       metFORMIN 500 MG tablet    GLUCOPHAGE    360 tablet    TAKE TWO TABLETS TWICE DAILY, WITH FOOD.       Potassium Gluconate 595 MG Caps      Take 595 mg by mouth daily       predniSONE 20 MG tablet    DELTASONE    10 tablet    Take one tablet twice a day for 5 days.       Selenium 200 MCG Tabs      Take 200 mcg by mouth daily       tamsulosin 0.4 MG capsule    FLOMAX     Take 0.4 mg by mouth daily       VITAMIN B COMPLEX PO      Take 1 tablet by mouth daily.       vitamin D 1000 UNITS capsule      Take 1 capsule by mouth daily.       * Notice:  This list has 2 medication(s) that are the same as other medications prescribed for you. Read the directions carefully, and ask your doctor or other care provider to review them with you.

## 2017-04-27 NOTE — PATIENT INSTRUCTIONS
Use Medication as directed    Hydrate with fluids, rest, cool humidifier.  May use acetaminophen, ibuprofen prn.    For your Cough   The Buckwheat Honey Dose: Given   hour Prior to Bedtime  For children age under 1 year -Do not use due to botulism risk     2-5 years -  tsp (2.5 mL)    6-11 years -1 tsp (5 mL)    12-18 years -2 tsp (10 mL)     Guaifenesin     Adult dose -Not to exceed 2.4 g (2400mg) per day    Child age 6-12 years -100 mg every 4 hr, not to exceed 1.2 g (1200mg) per day     Pediatric, 2-6 years -50 mg every 4 hr as needed, not to exceed 600 mg per day    Cough medications is not recommended in children under 2 years.  With use of cough medications have combination medications be aware of products in the cough medication you are using to avoid overdose    Follow up with PCP in 2 weeks.    Go to Emergency Room if sx worsen or change, Shortness of breath, chest pain, persistent fevers, or painful breathing occur.     Patient voiced understanding of instructions given.          Bronchitis, Viral (Adult)    You have a viral bronchitis. Bronchitis is inflammation and swelling of the lining of the lungs. This is often caused by an infection. Symptoms include a dry, hacking cough that is worse at night. The cough may bring up yellow-green mucus. You may also feel short of breath or wheeze. Other symptoms may include tiredness, chest discomfort, and chills.  Bronchitis that is caused by a virus is not treated with antibiotics. Instead, medicines may be given to help relieve symptoms. Symptoms can last up to 2 weeks, although the cough may last much longer.  This illness is contagious during the first few days and is spread through the air by coughing and sneezing, or by direct contact (touching the sick person and then touching your own eyes, nose, or mouth).  Most viral illnesses resolve within 10 to 14 days with rest and simple home remedies, although they may sometimes last for several weeks.  Home  care    If symptoms are severe, rest at home for the first 2 to 3 days. When you go back to your usual activities, don't let yourself get too tired.    Do not smoke. Also avoid being exposed to secondhand smoke.    You may use over-the-counter medicine to control fever or pain, unless another pain medicine was prescribed. (Note: If you have chronic liver or kidney disease or have ever had a stomach ulcer or gastrointestinal bleeding, talk with your healthcare provider before using these medicines. Also talk to your provider if you are taking medicine to prevent blood clots.) Aspirin should never be given to anyone younger than 18 years of age who is ill with a viral infection or fever. It may cause severe liver or brain damage.    Your appetite may be poor, so a light diet is fine. Avoid dehydration by drinking 6 to 8 glasses of fluids per day (such as water, soft drinks, sports drinks, juices, tea, or soup). Extra fluids will help loosen secretions in the nose and lungs.    Over-the-counter cough, cold, and sore-throat medicines will not shorten the length of the illness, but they may help to reduce symptoms. (Note: Do not use decongestants if you have high blood pressure.)  Follow-up care  Follow up with your healthcare provider, or as advised.  If you had an X-ray or ECG (electrocardiogram), a specialist will review it. You will be notified of any new findings that may affect your care.  Note: If you are age 65 or older, or if you have a chronic lung disease or condition that affects your immune system, or you smoke, talk to your healthcare provider about having pneumococcal vaccinations and a yearly influenza vaccination (flu shot).  When to seek medical advice   Call your healthcare provider right away if any of these occur:    Fever of 100.4 F (38 C) or higher    Coughing up increased amounts of colored sputum    Weakness, drowsiness, headache, facial pain, ear pain, or a stiff neck  Call 911, or get immediate  medical care  Contact emergency services right away if any of these occur:    Coughing up blood    Worsening weakness, drowsiness, headache, or stiff neck    Trouble breathing, wheezing, or pain with breathing    4606-5689 [employer]. [address] [place] [address], [place] [address]. All rights reserved. This information is not intended as a substitute for professional medical care. Always follow your healthcare professional's instructions.

## 2017-05-08 DIAGNOSIS — I10 BENIGN ESSENTIAL HYPERTENSION: ICD-10-CM

## 2017-05-08 RX ORDER — AMLODIPINE BESYLATE 5 MG/1
TABLET ORAL
Qty: 180 TABLET | Refills: 1 | Status: SHIPPED | OUTPATIENT
Start: 2017-05-08 | End: 2017-11-21

## 2017-05-08 NOTE — TELEPHONE ENCOUNTER
amLODIPine (NORVASC) 5 MG tablet      Last Written Prescription Date: 2/7/17  Last Fill Quantity: 180, # refills: 1  Last Office Visit with G, P or [facility] prescribing provider: 4/13/17       Potassium   Date Value Ref Range Status   04/12/2017 4.1 3.4 - 5.3 mmol/L Final     Creatinine   Date Value Ref Range Status   04/12/2017 1.13 0.66 - 1.25 mg/dL Final     BP Readings from Last 3 Encounters:   04/26/17 142/75   04/13/17 130/64   04/05/17 148/81

## 2017-05-26 ENCOUNTER — OFFICE VISIT (OUTPATIENT)
Dept: FAMILY MEDICINE | Facility: CLINIC | Age: 72
End: 2017-05-26
Payer: COMMERCIAL

## 2017-05-26 VITALS
SYSTOLIC BLOOD PRESSURE: 134 MMHG | BODY MASS INDEX: 29.7 KG/M2 | HEART RATE: 71 BPM | TEMPERATURE: 98.9 F | DIASTOLIC BLOOD PRESSURE: 60 MMHG | WEIGHT: 184 LBS | RESPIRATION RATE: 20 BRPM | OXYGEN SATURATION: 98 %

## 2017-05-26 DIAGNOSIS — R05.9 COUGH: ICD-10-CM

## 2017-05-26 DIAGNOSIS — J20.9 ACUTE BRONCHITIS WITH SYMPTOMS > 10 DAYS: Primary | ICD-10-CM

## 2017-05-26 PROCEDURE — 99214 OFFICE O/P EST MOD 30 MIN: CPT | Performed by: NURSE PRACTITIONER

## 2017-05-26 RX ORDER — PREDNISONE 20 MG/1
TABLET ORAL
Qty: 10 TABLET | Refills: 0 | Status: SHIPPED | OUTPATIENT
Start: 2017-05-26 | End: 2017-11-21

## 2017-05-26 RX ORDER — IPRATROPIUM BROMIDE AND ALBUTEROL SULFATE 2.5; .5 MG/3ML; MG/3ML
1 SOLUTION RESPIRATORY (INHALATION) ONCE
Qty: 1 VIAL | Refills: 0 | COMMUNITY
Start: 2017-05-26 | End: 2017-10-14

## 2017-05-26 RX ORDER — ALBUTEROL SULFATE 0.83 MG/ML
1 SOLUTION RESPIRATORY (INHALATION) EVERY 6 HOURS PRN
Qty: 25 VIAL | Refills: 0 | Status: SHIPPED | OUTPATIENT
Start: 2017-05-26 | End: 2019-12-11

## 2017-05-26 RX ORDER — ALBUTEROL SULFATE 0.83 MG/ML
1 SOLUTION RESPIRATORY (INHALATION) EVERY 6 HOURS PRN
Qty: 25 VIAL | Refills: 0 | Status: SHIPPED | OUTPATIENT
Start: 2017-05-26 | End: 2017-05-26

## 2017-05-26 NOTE — LETTER
My Asthma Action Plan  Name: Sae Milligan   YOB: 1945  Date: 5/26/2017   My doctor: SHUKRI SAME DAY PROVIDER   My clinic: [facility]        My Control Medicine: albuterol  My Rescue Medicine:    My Asthma Severity: intermittent  Avoid your asthma triggers:                GREEN ZONE     Good Control    I feel good    No cough or wheeze    Can work, sleep and play without asthma symptoms       Take your asthma control medicine every day.     1. If exercise triggers your asthma, take your rescue medication    15 minutes before exercise or sports, and    During exercise if you have asthma symptoms  2. Spacer to use with inhaler: If you have a spacer, make sure to use it with your inhaler             YELLOW ZONE     Getting Worse  I have ANY of these:    I do not feel good    Cough or wheeze    Chest feels tight    Wake up at night   1. Keep taking your Green Zone medications  2. Start taking your rescue medicine:    every 20 minutes for up to 1 hour. Then every 4 hours for 24-48 hours.  3. If you stay in the Yellow Zone for more than 12-24 hours, contact your doctor.  4. If you do not return to the Green Zone in 12-24 hours or you get worse, start taking your oral steroid medicine if prescribed by your provider.           RED ZONE     Medical Alert - Get Help  I have ANY of these:    I feel awful    Medicine is not helping    Breathing getting harder    Trouble walking or talking    Nose opens wide to breathe       1. Take your rescue medicine NOW  2. If your provider has prescribed an oral steroid medicine, start taking it NOW  3. Call your doctor NOW  4. If you are still in the Red Zone after 20 minutes and you have not reached your doctor:    Take your rescue medicine again and    Call 911 or go to the emergency room right away    See your regular doctor within 2 weeks of an Emergency Room or Urgent Care visit for follow-up treatment.        Electronically signed by: Camila Zheng  May 26, 2017    Annual Reminders:  Meet with Asthma Educator,  Flu Shot in the Fall, consider Pneumonia Vaccination for patients with asthma (aged 19 and older).    Pharmacy:    [facility] - [place], [place] - [address] [id number] [address]                    Asthma Triggers  How To Control Things That Make Your Asthma Worse    Triggers are things that make your asthma worse.  Look at the list below to help you find your triggers and what you can do about them.  You can help prevent asthma flare-ups by staying away from your triggers.      Trigger                                                          What you can do   Cigarette Smoke  Tobacco smoke can make asthma worse. Do not allow smoking in your home, car or around you.  Be sure no one smokes at a child s day care or school.  If you smoke, ask your health care provider for ways to help you quit.  Ask family members to quit too.  Ask your health care provider for a referral to Quit Plan to help you quit smoking, or call 2-116-239-PLAN.     Colds, Flu, Bronchitis  These are common triggers of asthma. Wash your hands often.  Don t touch your eyes, nose or mouth.  Get a flu shot every year.     Dust Mites  These are tiny bugs that live in cloth or carpet. They are too small to see. Wash sheets and blankets in hot water every week.   Encase pillows and mattress in dust mite proof covers.  Avoid having carpet if you can. If you have carpet, vacuum weekly.   Use a dust mask and HEPA vacuum.   Pollen and Outdoor Mold  Some people are allergic to trees, grass, or weed pollen, or molds. Try to keep your windows closed.  Limit time out doors when pollen count is high.   Ask you health care provider about taking medicine during allergy season.     Animal Dander  Some people are allergic to skin flakes, urine or saliva from pets with fur or feathers. Keep pets with fur or feathers out of your home.    If you  can t keep the pet outdoors, then keep the pet out of your bedroom.  Keep the bedroom door closed.  Keep pets off cloth furniture and away from stuffed toys.     Mice, Rats, and Cockroaches  Some people are allergic to the waste from these pests.   Cover food and garbage.  Clean up spills and food crumbs.  Store grease in the refrigerator.   Keep food out of the bedroom.   Indoor Mold  This can be a trigger if your home has high moisture. Fix leaking faucets, pipes, or other sources of water.   Clean moldy surfaces.  Dehumidify basement if it is damp and smelly.   Smoke, Strong Odors, and Sprays  These can reduce air quality. Stay away from strong odors and sprays, such as perfume, powder, hair spray, paints, smoke incense, paint, cleaning products, candles and new carpet.   Exercise or Sports  Some people with asthma have this trigger. Be active!  Ask your doctor about taking medicine before sports or exercise to prevent symptoms.    Warm up for 5-10 minutes before and after sports or exercise.     Other Triggers of Asthma  Cold air:  Cover your nose and mouth with a scarf.  Sometimes laughing or crying can be a trigger.  Some medicines and food can trigger asthma.

## 2017-05-26 NOTE — NURSING NOTE
"Chief Complaint   Patient presents with     Breathing Problem     shortness of breath       Initial /60  Pulse 71  Temp 98.9  F (37.2  C) (Tympanic)  Resp 20  Wt 184 lb (83.5 kg)  SpO2 98%  BMI 29.7 kg/m2 Estimated body mass index is 29.7 kg/(m^2) as calculated from the following:    Height as of 4/5/17: 5' 6\" (1.676 m).    Weight as of this encounter: 184 lb (83.5 kg).  Medication Reconciliation: complete     AAP given to pt  "

## 2017-05-26 NOTE — PROGRESS NOTES
SUBJECTIVE:                                                    Sae Milligan is a 71 year old male who presents to clinic today for the following health issues:      Acute Illness   Acute illness concerns: sob cough  Onset: 2-3 weeks ago    Fever: no    Chills/Sweats: no    Headache (location?): no    Sinus Pressure:no    Conjunctivitis:  no    Ear Pain: no    Rhinorrhea: no    Congestion: YES    Sore Throat: no     Cough: YES - worse at night    Wheeze: YES    Decreased Appetite: no    Nausea: no    Vomiting: no    Diarrhea:  no    Dysuria/Freq.: no    Fatigue/Achiness: no    Sick/Strep Exposure: no  Was seen on 4/26/17 dx with bronchitis   Therapies Tried and outcome: albuterol, tessalon pearls      Problem list and histories reviewed & adjusted, as indicated.  Additional history: as documented    Patient Active Problem List   Diagnosis     Type 2 diabetes mellitus with diabetic nephropathy (H)     Hyperlipidemia LDL goal <100     Other chronic allergic conjunctivitis     DJD (degenerative joint disease)     Health Care Home     Allergic rhinitis     CKD (chronic kidney disease) stage 1, GFR 90 ml/min or greater     Squamous cell carcinoma (H)     History of squamous cell carcinoma     SK (seborrheic keratosis)     Lentigo     Family history of prostate cancer     Restless leg syndrome     Anxiety     Past Surgical History:   Procedure Laterality Date     ARTHROPLASTY KNEE  2/10/2011    ARTHROPLASTY KNEE performed by DARLING FARRELL at [facility]     MOHS MICROGRAPHIC PROCEDURE  july 2012    Sq. cell left helix     ORTHOPEDIC SURGERY       SURGICAL HISTORY OF -   12/2003    Cervical spine fx       Social History   Substance Use Topics     Smoking status: Former Smoker     Quit date: 3/14/1990     Smokeless tobacco: Never Used     Alcohol use Yes      Comment: occasional     Family History   Problem Relation Age of Onset     Hypertension Mother      CEREBROVASCULAR DISEASE Mother      Hypertension Father       CEREBROVASCULAR DISEASE Father      DIABETES Brother      Prostate Cancer Brother      Prostate Cancer Brother          Current Outpatient Prescriptions   Medication Sig Dispense Refill     amLODIPine (NORVASC) 5 MG tablet TAKE 2 TABLETS EVERY  tablet 1     albuterol (PROAIR HFA/PROVENTIL HFA/VENTOLIN HFA) 108 (90 BASE) MCG/ACT Inhaler Inhale 2 puffs into the lungs every 6 hours as needed for shortness of breath / dyspnea or wheezing 1 Inhaler 0     Selenium 200 MCG TABS Take 200 mcg by mouth daily       magnesium 500 MG TABS Take 500 mg by mouth daily       Potassium Gluconate 595 MG CAPS Take 595 mg by mouth daily       lisinopril (PRINIVIL/ZESTRIL) 40 MG tablet Take 2 tablets (80 mg) by mouth daily 180 tablet 1     tamsulosin (FLOMAX) 0.4 MG capsule Take 0.4 mg by mouth daily       glipiZIDE (GLIPIZIDE XL) 5 MG 24 hr tablet Take 1 tablet (5 mg) by mouth daily 90 tablet 3     atorvastatin (LIPITOR) 20 MG tablet Take 1 tablet (20 mg) by mouth daily 90 tablet 3     metFORMIN (GLUCOPHAGE) 500 MG tablet TAKE TWO TABLETS TWICE DAILY, WITH FOOD. 360 tablet 3     aspirin 81 MG tablet Take 1 tablet by mouth daily.       Cholecalciferol (VITAMIN D) 1000 UNIT capsule Take 1 capsule by mouth daily.       B Complex Vitamins (VITAMIN B COMPLEX PO) Take 1 tablet by mouth daily.       CALCIUM CARBONATE-VITAMIN D 500-200 MG-UNIT OR TABS Take one tablet twice daily with food       albuterol (2.5 MG/3ML) 0.083% neb solution Take 1 vial (2.5 mg) by nebulization once 3 mL 0     predniSONE (DELTASONE) 20 MG tablet Take one tablet twice a day for 5 days. 10 tablet 0     benzonatate (TESSALON PERLES) 100 MG capsule Take 1 capsule (100 mg) by mouth 3 times daily as needed for cough (Patient not taking: Reported on 5/26/2017) 42 capsule 0     Ferrous Gluconate 256 (28 FE) MG TABS Take 28 mg by mouth daily       blood glucose monitoring (NO BRAND SPECIFIED) test strip 1 strip by In Vitro route daily Test 1x daily. Accu-Chek Marlene  test strips 2 Box 3     chlorthalidone (HYGROTON) 25 MG tablet Take 1 tablet (25 mg) by mouth daily 90 tablet 3     EPINEPHrine (EPIPEN) 0.3 MG/0.3ML injection Inject 0.3 mLs (0.3 mg) into the muscle as needed for anaphylaxis (Patient not taking: Reported on 5/26/2017) 0.6 mL 2     ORDER FOR ALLERGEN IMMUNOTHERAPY Mix #3  Tree , Weeds  Mt, White  GLY1:20 w/v, HS  0.3ml  Birch Mix GLY1:20 w/v, HS  0.3ml  Shelby GLY1:20 w/v, HS  0.3ml  Elm, American GLY1:20 w/v, HS  0.3ml  Lake, Black GLY1:20 w/v 0.3ml  Cocklebur GLY 1:20 w/v, HS 0.3ml  Lamb's Quarters GLY 1:20 w/v, ALK 0.3ml  Marshelder-Povertyweed GLY 1:20 w/v, HS 0.3ml  Nettle GLY 1:20 w/v, HS 0.3ml  Ragweed, Short GLY 1:20 w/v HS 0.3ml  Russian Thistle GLY 1:20 w/v, HS 0.3ml  Diluent: HSAqs to 5ml 5 mL PRN     Allergies   Allergen Reactions     Penicillins Hives, Itching and Swelling     Of lips       Reviewed and updated as needed this visit by clinical staff  Allergies       Reviewed and updated as needed this visit by Provider         ROS:  Constitutional, HEENT, cardiovascular, pulmonary, gi and gu systems are negative, except as otherwise noted.    OBJECTIVE:                                                    /60  Pulse 71  Temp 98.9  F (37.2  C) (Tympanic)  Resp 20  Wt 184 lb (83.5 kg)  SpO2 98%  BMI 29.7 kg/m2  Body mass index is 29.7 kg/(m^2).   GENERAL: healthy, alert and no distress  EYES: Eyes grossly normal to inspection, PERRL and conjunctivae and sclerae normal  HENT: ear canals and TM's normal, nose and mouth without ulcers or lesions  NECK: no adenopathy, no asymmetry, masses, or scars and thyroid normal to palpation  RESP: lungs clear to auscultation - no rales, rhonchi or wheezes  CV: regular rate and rhythm, normal S1 S2, no S3 or S4, no murmur, click or rub, no peripheral edema and peripheral pulses strong  MS: no gross musculoskeletal defects noted, no edema  SKIN: no suspicious lesions or rashes  NEURO: Normal strength and  tone, mentation intact and speech normal  PSYCH: mentation appears normal, affect normal/bright         ASSESSMENT:                                                        ICD-10-CM    1. Acute bronchitis with symptoms > 10 days J20.9 predniSONE (DELTASONE) 20 MG tablet     order for DME     albuterol (2.5 MG/3ML) 0.083% neb solution     DISCONTINUED: albuterol (2.5 MG/3ML) 0.083% neb solution   2. Cough R05 INHALATION/NEBULIZER TREATMENT, INITIAL         PLAN:                                                    Patient was given an in office albuterol nebulizer treatment while in the office with clearing of their wheezing.   Patient Instructions   Use Medication as directed    Patient advised to call for any test results (if obtained during visit) within 2-3 days.       Hydrate with fluids, rest, cool humidifier.  May use acetaminophen, ibuprofen prn.    For your Cough   The Buckwheat Honey Dose: Given   hour Prior to Bedtime  For children age under 1 year -Do not use due to botulism risk     2-5 years -  tsp (2.5 mL)    6-11 years -1 tsp (5 mL)    12-18 years -2 tsp (10 mL)     Guaifenesin     Adult dose -Not to exceed 2.4 g (2400mg) per day    Child age 6-12 years -100 mg every 4 hr, not to exceed 1.2 g (1200mg) per day     Pediatric, 2-6 years -50 mg every 4 hr as needed, not to exceed 600 mg per day    Cough medications is not recommended in children under 2 years.  With use of cough medications have combination medications be aware of products in the cough medication you are using to avoid overdose    Follow up with PCP in 2 weeks.    Go to Emergency Room if sx worsen or change, Shortness of breath, chest pain, persistent fevers, or painful breathing occur.     Patient voiced understanding of instructions given.          Bronchitis, Viral (Adult)    You have a viral bronchitis. Bronchitis is inflammation and swelling of the lining of the lungs. This is often caused by an infection. Symptoms include a dry,  hacking cough that is worse at night. The cough may bring up yellow-green mucus. You may also feel short of breath or wheeze. Other symptoms may include tiredness, chest discomfort, and chills.  Bronchitis that is caused by a virus is not treated with antibiotics. Instead, medicines may be given to help relieve symptoms. Symptoms can last up to 2 weeks, although the cough may last much longer.  This illness is contagious during the first few days and is spread through the air by coughing and sneezing, or by direct contact (touching the sick person and then touching your own eyes, nose, or mouth).  Most viral illnesses resolve within 10 to 14 days with rest and simple home remedies, although they may sometimes last for several weeks.  Home care    If symptoms are severe, rest at home for the first 2 to 3 days. When you go back to your usual activities, don't let yourself get too tired.    Do not smoke. Also avoid being exposed to secondhand smoke.    You may use over-the-counter medicine to control fever or pain, unless another pain medicine was prescribed. (Note: If you have chronic liver or kidney disease or have ever had a stomach ulcer or gastrointestinal bleeding, talk with your healthcare provider before using these medicines. Also talk to your provider if you are taking medicine to prevent blood clots.) Aspirin should never be given to anyone younger than 18 years of age who is ill with a viral infection or fever. It may cause severe liver or brain damage.    Your appetite may be poor, so a light diet is fine. Avoid dehydration by drinking 6 to 8 glasses of fluids per day (such as water, soft drinks, sports drinks, juices, tea, or soup). Extra fluids will help loosen secretions in the nose and lungs.    Over-the-counter cough, cold, and sore-throat medicines will not shorten the length of the illness, but they may help to reduce symptoms. (Note: Do not use decongestants if you have high blood  pressure.)  Follow-up care  Follow up with your healthcare provider, or as advised.  If you had an X-ray or ECG (electrocardiogram), a specialist will review it. You will be notified of any new findings that may affect your care.  Note: If you are age 65 or older, or if you have a chronic lung disease or condition that affects your immune system, or you smoke, talk to your healthcare provider about having pneumococcal vaccinations and a yearly influenza vaccination (flu shot).  When to seek medical advice   Call your healthcare provider right away if any of these occur:    Fever of 100.4 F (38 C) or higher    Coughing up increased amounts of colored sputum    Weakness, drowsiness, headache, facial pain, ear pain, or a stiff neck  Call 911, or get immediate medical care  Contact emergency services right away if any of these occur:    Coughing up blood    Worsening weakness, drowsiness, headache, or stiff neck    Trouble breathing, wheezing, or pain with breathing    [id number] [employer]. [address] [place] [address], [place] [address]. All rights reserved. This information is not intended as a substitute for professional medical care. Always follow your healthcare professional's instructions.            ANGEL Singh [facility]

## 2017-05-26 NOTE — MR AVS SNAPSHOT
After Visit Summary   5/26/2017    Sae Milligan    MRN: [id number]           Patient Information     Date Of Birth          1945        Visit Information        Provider Department      5/26/2017 12:20 PM Dionna Velez APRN [facility]        Today's Diagnoses     Cough    -  1    Acute bronchitis with symptoms > 10 days          Care Instructions    Use Medication as directed    Patient advised to call for any test results (if obtained during visit) within 2-3 days.       Hydrate with fluids, rest, cool humidifier.  May use acetaminophen, ibuprofen prn.    For your Cough   The Buckwheat Honey Dose: Given   hour Prior to Bedtime  For children age under 1 year -Do not use due to botulism risk     2-5 years -  tsp (2.5 mL)    6-11 years -1 tsp (5 mL)    12-18 years -2 tsp (10 mL)     Guaifenesin     Adult dose -Not to exceed 2.4 g (2400mg) per day    Child age 6-12 years -100 mg every 4 hr, not to exceed 1.2 g (1200mg) per day     Pediatric, 2-6 years -50 mg every 4 hr as needed, not to exceed 600 mg per day    Cough medications is not recommended in children under 2 years.  With use of cough medications have combination medications be aware of products in the cough medication you are using to avoid overdose    Follow up with PCP in 2 weeks.    Go to Emergency Room if sx worsen or change, Shortness of breath, chest pain, persistent fevers, or painful breathing occur.     Patient voiced understanding of instructions given.          Bronchitis, Viral (Adult)    You have a viral bronchitis. Bronchitis is inflammation and swelling of the lining of the lungs. This is often caused by an infection. Symptoms include a dry, hacking cough that is worse at night. The cough may bring up yellow-green mucus. You may also feel short of breath or wheeze. Other symptoms may include tiredness, chest discomfort, and chills.  Bronchitis that is caused by a virus is not treated with  antibiotics. Instead, medicines may be given to help relieve symptoms. Symptoms can last up to 2 weeks, although the cough may last much longer.  This illness is contagious during the first few days and is spread through the air by coughing and sneezing, or by direct contact (touching the sick person and then touching your own eyes, nose, or mouth).  Most viral illnesses resolve within 10 to 14 days with rest and simple home remedies, although they may sometimes last for several weeks.  Home care    If symptoms are severe, rest at home for the first 2 to 3 days. When you go back to your usual activities, don't let yourself get too tired.    Do not smoke. Also avoid being exposed to secondhand smoke.    You may use over-the-counter medicine to control fever or pain, unless another pain medicine was prescribed. (Note: If you have chronic liver or kidney disease or have ever had a stomach ulcer or gastrointestinal bleeding, talk with your healthcare provider before using these medicines. Also talk to your provider if you are taking medicine to prevent blood clots.) Aspirin should never be given to anyone younger than 18 years of age who is ill with a viral infection or fever. It may cause severe liver or brain damage.    Your appetite may be poor, so a light diet is fine. Avoid dehydration by drinking 6 to 8 glasses of fluids per day (such as water, soft drinks, sports drinks, juices, tea, or soup). Extra fluids will help loosen secretions in the nose and lungs.    Over-the-counter cough, cold, and sore-throat medicines will not shorten the length of the illness, but they may help to reduce symptoms. (Note: Do not use decongestants if you have high blood pressure.)  Follow-up care  Follow up with your healthcare provider, or as advised.  If you had an X-ray or ECG (electrocardiogram), a specialist will review it. You will be notified of any new findings that may affect your care.  Note: If you are age 65 or older, or  if you have a chronic lung disease or condition that affects your immune system, or you smoke, talk to your healthcare provider about having pneumococcal vaccinations and a yearly influenza vaccination (flu shot).  When to seek medical advice   Call your healthcare provider right away if any of these occur:    Fever of 100.4 F (38 C) or higher    Coughing up increased amounts of colored sputum    Weakness, drowsiness, headache, facial pain, ear pain, or a stiff neck  Call 911, or get immediate medical care  Contact emergency services right away if any of these occur:    Coughing up blood    Worsening weakness, drowsiness, headache, or stiff neck    Trouble breathing, wheezing, or pain with breathing    7302-6169 [employer]. [address] [place] [address], [place] [address]. All rights reserved. This information is not intended as a substitute for professional medical care. Always follow your healthcare professional's instructions.                Follow-ups after your visit        Who to contact     If you have questions or need follow up information about today's clinic visit or your schedule please contact [facility] directly at [phone].  Normal or non-critical lab and imaging results will be communicated to you by [employer], letter or phone within 4 business days after the clinic has received the results. If you do not hear from us within 7 days, please contact the clinic through [employer] or phone. If you have a critical or abnormal lab result, we will notify you by phone as soon as possible.  Submit refill requests through [employer] or call your pharmacy and they will forward the refill request to us. Please allow 3 business days for your refill to be completed.          Additional Information About Your Visit        [employer] Information     [employer] gives you secure access to your electronic health record. If you see a primary care provider, you can also send messages to your care team  and make appointments. If you have questions, please call your primary care clinic.  If you do not have a primary care provider, please call [phone] and they will assist you.        Care EveryWhere ID     This is your Care EveryWhere ID. This could be used by other organizations to access your [place] medical records  [id number]        Your Vitals Were     Pulse Temperature Respirations Pulse Oximetry BMI (Body Mass Index)       71 98.9  F (37.2  C) (Tympanic) 20 98% 29.7 kg/m2        Blood Pressure from Last 3 Encounters:   05/26/17 134/60   04/26/17 142/75   04/13/17 130/64    Weight from Last 3 Encounters:   05/26/17 184 lb (83.5 kg)   04/26/17 188 lb 6.4 oz (85.5 kg)   04/13/17 185 lb (83.9 kg)              We Performed the Following     INHALATION/NEBULIZER TREATMENT, INITIAL          Today's Medication Changes          These changes are accurate as of: 5/26/17  1:28 PM.  If you have any questions, ask your nurse or doctor.               Start taking these medicines.        Dose/Directions    order for DME   Used for:  Acute bronchitis with symptoms > 10 days   Started by:  Dionna Velez APRN CNP        Nebulizer   Quantity:  1 Units   Refills:  0         These medicines have changed or have updated prescriptions.        Dose/Directions    * albuterol (2.5 MG/3ML) 0.083% neb solution   This may have changed:  Another medication with the same name was added. Make sure you understand how and when to take each.   Changed by:  Dionna Velez APRN CNP        Dose:  1 vial   Take 1 vial (2.5 mg) by nebulization once   Quantity:  3 mL   Refills:  0       * albuterol 108 (90 BASE) MCG/ACT Inhaler   Commonly known as:  PROAIR HFA/PROVENTIL HFA/VENTOLIN HFA   This may have changed:  Another medication with the same name was added. Make sure you understand how and when to take each.   Used for:  Acute bronchitis, unspecified organism   Changed by:  Dionna Velez APRN CNP        Dose:  2 puff   Inhale 2  puffs into the lungs every 6 hours as needed for shortness of breath / dyspnea or wheezing   Quantity:  1 Inhaler   Refills:  0       * albuterol (2.5 MG/3ML) 0.083% neb solution   This may have changed:  You were already taking a medication with the same name, and this prescription was added. Make sure you understand how and when to take each.   Used for:  Acute bronchitis with symptoms > 10 days   Changed by:  Dionna Velez APRN CNP        Dose:  1 vial   Take 1 vial (2.5 mg) by nebulization every 6 hours as needed for shortness of breath / dyspnea or wheezing   Quantity:  25 vial   Refills:  0       * predniSONE 20 MG tablet   Commonly known as:  DELTASONE   This may have changed:  Another medication with the same name was added. Make sure you understand how and when to take each.   Used for:  Acute bronchitis, unspecified organism   Changed by:  Dionna Velez APRN CNP        Take one tablet twice a day for 5 days.   Quantity:  10 tablet   Refills:  0       * predniSONE 20 MG tablet   Commonly known as:  DELTASONE   This may have changed:  You were already taking a medication with the same name, and this prescription was added. Make sure you understand how and when to take each.   Used for:  Acute bronchitis with symptoms > 10 days   Changed by:  Dionna Velez APRN CNP        Take one tablet twice a day for 5 days.   Quantity:  10 tablet   Refills:  0       * Notice:  This list has 5 medication(s) that are the same as other medications prescribed for you. Read the directions carefully, and ask your doctor or other care provider to review them with you.         Where to get your medicines      These medications were sent to [facility] [address] [place] [address] - [address]   [address] [facility] [address]     Phone:  [phone]     albuterol (2.5 MG/3ML) 0.083% neb solution    predniSONE 20 MG tablet         Some of these will need a paper prescription and others can be bought over  the counter.  Ask your nurse if you have questions.     Bring a paper prescription for each of these medications     order for DME                Primary Care Provider Office Phone # Fax #    Kelvin Keenan [phone]       [address] [address]        Thank you!     Thank you for choosing [facility]  for your care. Our goal is always to provide you with excellent care. Hearing back from our patients is one way we can continue to improve our services. Please take a few minutes to complete the written survey that you may receive in the mail after your visit with us. Thank you!             Your Updated Medication List - Protect others around you: Learn how to safely use, store and throw away your medicines at www.disposemymeds.org.          This list is accurate as of: 5/26/17  1:28 PM.  Always use your most recent med list.                   Brand Name Dispense Instructions for use    * albuterol (2.5 MG/3ML) 0.083% neb solution     3 mL    Take 1 vial (2.5 mg) by nebulization once       * albuterol 108 (90 BASE) MCG/ACT Inhaler    PROAIR HFA/PROVENTIL HFA/VENTOLIN HFA    1 Inhaler    Inhale 2 puffs into the lungs every 6 hours as needed for shortness of breath / dyspnea or wheezing       * albuterol (2.5 MG/3ML) 0.083% neb solution     25 vial    Take 1 vial (2.5 mg) by nebulization every 6 hours as needed for shortness of breath / dyspnea or wheezing       ALLERGEN IMMUNOTHERAPY PRESCRIPTION     5 mL    Mix #3  Tree , [place], [place]  GLY1:20 w/v, HS  0.3ml Birch Mix GLY1:20 w/v, HS  0.3ml [place] GLY1:20 w/v, HS  0.3ml Elm, American GLY1:20 w/v, HS  0.3ml [place], Black GLY1:20 w/v 0.3ml [place] GLY 1:20 w/v, HS 0.3ml [place] GLY 1:20 w/v, ALK 0.3ml [place] GLY 1:20 w/v, HS 0.3ml Nettle GLY 1:20 w/v, HS 0.3ml Ragweed, Short GLY 1:20 w/v HS 0.3ml Russian Thistle GLY 1:20 w/v, HS 0.3ml  Diluent: HSAqs to 5ml       amLODIPine 5 MG tablet    NORVASC    180 tablet    TAKE 2 TABLETS EVERY DAY       aspirin 81 MG tablet      Take 1 tablet by mouth daily.       atorvastatin 20 MG tablet    LIPITOR    90 tablet    Take 1 tablet (20 mg) by mouth daily       benzonatate 100 MG capsule    TESSALON PERLES    42 capsule    Take 1 capsule (100 mg) by mouth 3 times daily as needed for cough       blood glucose monitoring test strip    no brand specified    2 Box    1 strip by In Vitro route daily Test 1x daily. Accu-Chek Marlene test strips       calcium carb 1250 mg (500 mg [place])/vitamin D 200 unit 500-200 MG-UNIT per tablet    OSCAL with D     Take one tablet twice daily with food       chlorthalidone 25 MG tablet    HYGROTON    90 tablet    Take 1 tablet (25 mg) by mouth daily       EPINEPHrine 0.3 MG/0.3ML injection     0.6 mL    Inject 0.3 mLs (0.3 mg) into the muscle as needed for anaphylaxis       Ferrous Gluconate 256 (28 FE) MG Tabs      Take 28 mg by mouth daily       glipiZIDE 5 MG 24 hr tablet    glipiZIDE XL    90 tablet    Take 1 tablet (5 mg) by mouth daily       ipratropium - albuterol 0.5 mg/2.5 mg/3 mL 0.5-2.5 (3) MG/3ML neb solution    DUONEB    1 vial    Take 1 vial (3 mLs) by nebulization once for 1 dose       lisinopril 40 MG tablet    PRINIVIL/ZESTRIL    180 tablet    Take 2 tablets (80 mg) by mouth daily       magnesium 500 MG Tabs      Take 500 mg by mouth daily       metFORMIN 500 MG tablet    GLUCOPHAGE    360 tablet    TAKE TWO TABLETS TWICE DAILY, WITH FOOD.       order for DME     1 Units    Nebulizer       Potassium Gluconate 595 MG Caps      Take 595 mg by mouth daily       * predniSONE 20 MG tablet    DELTASONE    10 tablet    Take one tablet twice a day for 5 days.       * predniSONE 20 MG tablet    DELTASONE    10 tablet    Take one tablet twice a day for 5 days.       Selenium 200 MCG Tabs      Take 200 mcg by mouth daily       tamsulosin 0.4 MG capsule    FLOMAX     Take 0.4 mg  by mouth daily       VITAMIN B COMPLEX PO      Take 1 tablet by mouth daily.       vitamin D 1000 UNITS capsule      Take 1 capsule by mouth daily.       * Notice:  This list has 5 medication(s) that are the same as other medications prescribed for you. Read the directions carefully, and ask your doctor or other care provider to review them with you.

## 2017-05-26 NOTE — PATIENT INSTRUCTIONS
Use Medication as directed    Patient advised to call for any test results (if obtained during visit) within 2-3 days.       Hydrate with fluids, rest, cool humidifier.  May use acetaminophen, ibuprofen prn.    For your Cough   The Buckwheat Honey Dose: Given   hour Prior to Bedtime  For children age under 1 year -Do not use due to botulism risk     2-5 years -  tsp (2.5 mL)    6-11 years -1 tsp (5 mL)    12-18 years -2 tsp (10 mL)     Guaifenesin     Adult dose -Not to exceed 2.4 g (2400mg) per day    Child age 6-12 years -100 mg every 4 hr, not to exceed 1.2 g (1200mg) per day     Pediatric, 2-6 years -50 mg every 4 hr as needed, not to exceed 600 mg per day    Cough medications is not recommended in children under 2 years.  With use of cough medications have combination medications be aware of products in the cough medication you are using to avoid overdose    Follow up with PCP in 2 weeks.    Go to Emergency Room if sx worsen or change, Shortness of breath, chest pain, persistent fevers, or painful breathing occur.     Patient voiced understanding of instructions given.          Bronchitis, Viral (Adult)    You have a viral bronchitis. Bronchitis is inflammation and swelling of the lining of the lungs. This is often caused by an infection. Symptoms include a dry, hacking cough that is worse at night. The cough may bring up yellow-green mucus. You may also feel short of breath or wheeze. Other symptoms may include tiredness, chest discomfort, and chills.  Bronchitis that is caused by a virus is not treated with antibiotics. Instead, medicines may be given to help relieve symptoms. Symptoms can last up to 2 weeks, although the cough may last much longer.  This illness is contagious during the first few days and is spread through the air by coughing and sneezing, or by direct contact (touching the sick person and then touching your own eyes, nose, or mouth).  Most viral illnesses resolve within 10 to 14 days with  rest and simple home remedies, although they may sometimes last for several weeks.  Home care    If symptoms are severe, rest at home for the first 2 to 3 days. When you go back to your usual activities, don't let yourself get too tired.    Do not smoke. Also avoid being exposed to secondhand smoke.    You may use over-the-counter medicine to control fever or pain, unless another pain medicine was prescribed. (Note: If you have chronic liver or kidney disease or have ever had a stomach ulcer or gastrointestinal bleeding, talk with your healthcare provider before using these medicines. Also talk to your provider if you are taking medicine to prevent blood clots.) Aspirin should never be given to anyone younger than 18 years of age who is ill with a viral infection or fever. It may cause severe liver or brain damage.    Your appetite may be poor, so a light diet is fine. Avoid dehydration by drinking 6 to 8 glasses of fluids per day (such as water, soft drinks, sports drinks, juices, tea, or soup). Extra fluids will help loosen secretions in the nose and lungs.    Over-the-counter cough, cold, and sore-throat medicines will not shorten the length of the illness, but they may help to reduce symptoms. (Note: Do not use decongestants if you have high blood pressure.)  Follow-up care  Follow up with your healthcare provider, or as advised.  If you had an X-ray or ECG (electrocardiogram), a specialist will review it. You will be notified of any new findings that may affect your care.  Note: If you are age 65 or older, or if you have a chronic lung disease or condition that affects your immune system, or you smoke, talk to your healthcare provider about having pneumococcal vaccinations and a yearly influenza vaccination (flu shot).  When to seek medical advice   Call your healthcare provider right away if any of these occur:    Fever of 100.4 F (38 C) or higher    Coughing up increased amounts of colored sputum    Weakness,  drowsiness, headache, facial pain, ear pain, or a stiff neck  Call 911, or get immediate medical care  Contact emergency services right away if any of these occur:    Coughing up blood    Worsening weakness, drowsiness, headache, or stiff neck    Trouble breathing, wheezing, or pain with breathing    [id number] [employer]. [address] [place] [address]. All rights reserved. This information is not intended as a substitute for professional medical care. Always follow your healthcare professional's instructions.

## 2017-05-30 ENCOUNTER — TELEPHONE (OUTPATIENT)
Dept: ALLERGY | Facility: CLINIC | Age: 72
End: 2017-05-30

## 2017-06-06 ENCOUNTER — OFFICE VISIT (OUTPATIENT)
Dept: FAMILY MEDICINE | Facility: CLINIC | Age: 72
End: 2017-06-06
Payer: COMMERCIAL

## 2017-06-06 VITALS
SYSTOLIC BLOOD PRESSURE: 110 MMHG | HEART RATE: 83 BPM | TEMPERATURE: 98.4 F | WEIGHT: 179 LBS | RESPIRATION RATE: 18 BRPM | DIASTOLIC BLOOD PRESSURE: 64 MMHG | OXYGEN SATURATION: 98 % | BODY MASS INDEX: 28.89 KG/M2

## 2017-06-06 DIAGNOSIS — J20.9 ACUTE BRONCHITIS, UNSPECIFIED ORGANISM: ICD-10-CM

## 2017-06-06 PROCEDURE — 99214 OFFICE O/P EST MOD 30 MIN: CPT | Performed by: FAMILY MEDICINE

## 2017-06-06 RX ORDER — ALBUTEROL SULFATE 90 UG/1
2 AEROSOL, METERED RESPIRATORY (INHALATION) EVERY 6 HOURS PRN
Qty: 1 INHALER | Refills: 0 | Status: SHIPPED | OUTPATIENT
Start: 2017-06-06 | End: 2018-05-22

## 2017-06-06 NOTE — NURSING NOTE
"Chief Complaint   Patient presents with     Cough     Has been ongoing for about 3 weeks, no fever. Was seen 5/26/17- they didn't do an x-ray at that time.       Initial /64  Pulse 83  Temp 98.4  F (36.9  C) (Tympanic)  Resp 18  Wt 179 lb (81.2 kg)  SpO2 98%  BMI 28.89 kg/m2 Estimated body mass index is 28.89 kg/(m^2) as calculated from the following:    Height as of 4/5/17: 5' 6\" (1.676 m).    Weight as of this encounter: 179 lb (81.2 kg).  Medication Reconciliation: complete      "

## 2017-06-06 NOTE — PATIENT INSTRUCTIONS
1. Stay with the prednisone and the oral inhaler with cortisone for now    2. Ok to use the albuterol.     3. This should get better.  Come back in three weeks.

## 2017-06-06 NOTE — PROGRESS NOTES
SUBJECTIVE:                                                    Sae Milligan is a 71 year old male who presents to clinic today for the following health issues: This man who has a long-standing history of very mild asthma has been having more trouble with the last month and he's been seen here and he's also been seen by respiratory specialist in [place]. Currently is just started his increased dose of pregnancy. Continues to have some shortness of breath and cough. No major past history of smoking.      Acute Illness   Acute illness concerns: coughing  Onset: has been ongoing for the last 3 weeks    Fever: no    Chills/Sweats: no    Headache (location?): no    Sinus Pressure:no    Conjunctivitis:  no    Ear Pain: no    Rhinorrhea: no    Congestion: no    Sore Throat: no     Cough: YES-non-productive    Wheeze: YES    Decreased Appetite: YES    Nausea: no    Vomiting: no    Diarrhea:  no    Dysuria/Freq.: no    Fatigue/Achiness: YES    Sick/Strep Exposure: no     Therapies Tried and outcome: Albuterol             Problem list and histories reviewed & adjusted, as indicated.  Additional history: as documented    Patient Active Problem List   Diagnosis     Type 2 diabetes mellitus with diabetic nephropathy (H)     Hyperlipidemia LDL goal <100     Other chronic allergic conjunctivitis     DJD (degenerative joint disease)     Health Care Home     Allergic rhinitis     CKD (chronic kidney disease) stage 1, GFR 90 ml/min or greater     Squamous cell carcinoma (H)     History of squamous cell carcinoma     SK (seborrheic keratosis)     Lentigo     Family history of prostate cancer     Restless leg syndrome     Anxiety     Past Surgical History:   Procedure Laterality Date     ARTHROPLASTY KNEE  2/10/2011    ARTHROPLASTY KNEE performed by DARLING FARRELL at [facility]     MOHS MICROGRAPHIC PROCEDURE  july 2012    Sq. cell left helix     ORTHOPEDIC SURGERY       SURGICAL HISTORY OF -   12/2003    Cervical spine fx       Social  History   Substance Use Topics     Smoking status: Former Smoker     Quit date: 3/14/1990     Smokeless tobacco: Never Used     Alcohol use Yes      Comment: occasional     Family History   Problem Relation Age of Onset     Hypertension Mother      CEREBROVASCULAR DISEASE Mother      Hypertension Father      CEREBROVASCULAR DISEASE Father      DIABETES Brother      Prostate Cancer Brother      Prostate Cancer Brother          Current Outpatient Prescriptions   Medication Sig Dispense Refill     albuterol (PROAIR HFA/PROVENTIL HFA/VENTOLIN HFA) 108 (90 BASE) MCG/ACT Inhaler Inhale 2 puffs into the lungs every 6 hours as needed for shortness of breath / dyspnea or wheezing 1 Inhaler 0     predniSONE (DELTASONE) 20 MG tablet Take one tablet twice a day for 5 days. 10 tablet 0     predniSONE (DELTASONE) 20 MG tablet Take one tablet twice a day for 5 days. 10 tablet 0     amLODIPine (NORVASC) 5 MG tablet TAKE 2 TABLETS EVERY DAY (Patient taking differently: take 1 a day) 180 tablet 1     Selenium 200 MCG TABS Take 200 mcg by mouth daily       Ferrous Gluconate 256 (28 FE) MG TABS Take 28 mg by mouth daily       magnesium 500 MG TABS Take 500 mg by mouth daily       Potassium Gluconate 595 MG CAPS Take 595 mg by mouth daily       blood glucose monitoring (NO BRAND SPECIFIED) test strip 1 strip by In Vitro route daily Test 1x daily. Accu-Chek Marlene test strips 2 Box 3     lisinopril (PRINIVIL/ZESTRIL) 40 MG tablet Take 2 tablets (80 mg) by mouth daily 180 tablet 1     tamsulosin (FLOMAX) 0.4 MG capsule Take 0.4 mg by mouth daily       chlorthalidone (HYGROTON) 25 MG tablet Take 1 tablet (25 mg) by mouth daily 90 tablet 3     glipiZIDE (GLIPIZIDE XL) 5 MG 24 hr tablet Take 1 tablet (5 mg) by mouth daily 90 tablet 3     atorvastatin (LIPITOR) 20 MG tablet Take 1 tablet (20 mg) by mouth daily 90 tablet 3     metFORMIN (GLUCOPHAGE) 500 MG tablet TAKE TWO TABLETS TWICE DAILY, WITH FOOD. 360 tablet 3     aspirin 81 MG tablet  Take 1 tablet by mouth daily.       Cholecalciferol (VITAMIN D) 1000 UNIT capsule Take 1 capsule by mouth daily.       B Complex Vitamins (VITAMIN B COMPLEX PO) Take 1 tablet by mouth daily.       CALCIUM CARBONATE-VITAMIN D 500-200 MG-UNIT OR TABS Take one tablet twice daily with food       ipratropium - albuterol 0.5 mg/2.5 mg/3 mL (DUONEB) 0.5-2.5 (3) MG/3ML neb solution Take 1 vial (3 mLs) by nebulization once for 1 dose 1 vial 0     order for DME Nebulizer 1 Units 0     albuterol (2.5 MG/3ML) 0.083% neb solution Take 1 vial (2.5 mg) by nebulization every 6 hours as needed for shortness of breath / dyspnea or wheezing (Patient not taking: Reported on 6/6/2017) 25 vial 0     albuterol (2.5 MG/3ML) 0.083% neb solution Take 1 vial (2.5 mg) by nebulization once 3 mL 0     benzonatate (TESSALON PERLES) 100 MG capsule Take 1 capsule (100 mg) by mouth 3 times daily as needed for cough (Patient not taking: Reported on 5/26/2017) 42 capsule 0     [DISCONTINUED] albuterol (PROAIR HFA/PROVENTIL HFA/VENTOLIN HFA) 108 (90 BASE) MCG/ACT Inhaler Inhale 2 puffs into the lungs every 6 hours as needed for shortness of breath / dyspnea or wheezing 1 Inhaler 0     EPINEPHrine (EPIPEN) 0.3 MG/0.3ML injection Inject 0.3 mLs (0.3 mg) into the muscle as needed for anaphylaxis (Patient not taking: Reported on 5/26/2017) 0.6 mL 2     ORDER FOR ALLERGEN IMMUNOTHERAPY Mix #3  Tree , Weeds  Mt, White  GLY1:20 w/v, HS  0.3ml  Birch Mix GLY1:20 w/v, HS  0.3ml  [place] GLY1:20 w/v, HS  0.3ml  Elm, American GLY1:20 w/v, HS  0.3ml  [place], Black GLY1:20 w/v 0.3ml  [place] GLY 1:20 w/v, HS 0.3ml  Lamb's Quarters GLY 1:20 w/v, ALK 0.3ml  Mars[place]er-Povertyweed GLY 1:20 w/v, HS 0.3ml  Nettle GLY 1:20 w/v, HS 0.3ml  Ragweed, Short GLY 1:20 w/v HS 0.3ml  Russian Thistle GLY 1:20 w/v, HS 0.3ml  Diluent: HSAqs to 5ml 5 mL PRN       Reviewed and updated as needed this visit by clinical staff  Tobacco  Allergies  Meds  Med Hx  Surg Hx  Fam Hx   Soc Hx      Reviewed and updated as needed this visit by Provider         ROS:  C: NEGATIVE for fever, chills, change in weight  E/M: NEGATIVE for ear, mouth and throat problems  R: NEGATIVE for significant cough or SOB  CV: NEGATIVE for chest pain, palpitations or peripheral edema    OBJECTIVE:                                                    /64  Pulse 83  Temp 98.4  F (36.9  C) (Tympanic)  Resp 18  Wt 179 lb (81.2 kg)  SpO2 98%  BMI 28.89 kg/m2  Body mass index is 28.89 kg/(m^2).  GENERAL: healthy, alert and no distress  NECK: no adenopathy, no asymmetry, masses, or scars and thyroid normal to palpation  RESP: lungs he has bilateral wheezing.  CV: regular rate and rhythm, normal S1 S2, no S3 or S4, no murmur, click or rub, no peripheral edema and peripheral pulses strong  ABDOMEN: soft, nontender, no hepatosplenomegaly, no masses and bowel sounds normal  MS: no gross musculoskeletal defects noted, no edema         ASSESSMENT/PLAN:                                                            1. Acute bronchitis, unspecified organism  Probably acute flare of his asthma.  - albuterol (PROAIR HFA/PROVENTIL HFA/VENTOLIN HFA) 108 (90 BASE) MCG/ACT Inhaler; Inhale 2 puffs into the lungs every 6 hours as needed for shortness of breath / dyspnea or wheezing  Dispense: 1 Inhaler; Refill: 0    ASSESSMENT/PLAN:      ICD-10-CM    1. Acute bronchitis, unspecified organism J20.9 albuterol (PROAIR HFA/PROVENTIL HFA/VENTOLIN HFA) 108 (90 BASE) MCG/ACT Inhaler       Patient Instructions   1. Stay with the prednisone and the oral inhaler with cortisone for now    2. Ok to use the albuterol.     3. This should get better.  Come back in three weeks.           Kelvin Keenan MD  [facility]

## 2017-06-06 NOTE — MR AVS SNAPSHOT
After Visit Summary   6/6/2017    Sae Milligan    MRN: [id number]           Patient Information     Date Of Birth          1945        Visit Information        Provider Department      6/6/2017 10:20 AM Kelvin Keenan MD [facility]        Today's Diagnoses     Acute bronchitis, unspecified organism          Care Instructions    1. Stay with the prednisone and the oral inhaler with cortisone for now    2. Ok to use the albuterol.     3. This should get better.  Come back in three weeks.           Follow-ups after your visit        Who to contact     If you have questions or need follow up information about today's clinic visit or your schedule please contact [facility] directly at [phone].  Normal or non-critical lab and imaging results will be communicated to you by [employer], letter or phone within 4 business days after the clinic has received the results. If you do not hear from us within 7 days, please contact the clinic through [employer] or phone. If you have a critical or abnormal lab result, we will notify you by phone as soon as possible.  Submit refill requests through [employer] or call your pharmacy and they will forward the refill request to us. Please allow 3 business days for your refill to be completed.          Additional Information About Your Visit        MyChart Information     [employer] gives you secure access to your electronic health record. If you see a primary care provider, you can also send messages to your care team and make appointments. If you have questions, please call your primary care clinic.  If you do not have a primary care provider, please call [phone] and they will assist you.        Care EveryWhere ID     This is your Care EveryWhere ID. This could be used by other organizations to access your [facility] medical records  [id number]        Your Vitals Were     Pulse Temperature Respirations Pulse Oximetry BMI  (Body Mass Index)       83 98.4  F (36.9  C) (Tympanic) 18 98% 28.89 kg/m2        Blood Pressure from Last 3 Encounters:   06/06/17 110/64   05/26/17 134/60   04/26/17 142/75    Weight from Last 3 Encounters:   06/06/17 179 lb (81.2 kg)   05/26/17 184 lb (83.5 kg)   04/26/17 188 lb 6.4 oz (85.5 kg)              Today, you had the following     No orders found for display         Today's Medication Changes          These changes are accurate as of: 6/6/17 10:53 AM.  If you have any questions, ask your nurse or doctor.               These medicines have changed or have updated prescriptions.        Dose/Directions    amLODIPine 5 MG tablet   Commonly known as:  NORVASC   This may have changed:  See the new instructions.   Used for:  Benign essential hypertension        TAKE 2 TABLETS EVERY DAY   Quantity:  180 tablet   Refills:  1            Where to get your medicines      These medications were sent to [facility] - [facility] [address]  [address], [facility] [address]     Phone:  [phone]     albuterol 108 (90 BASE) MCG/ACT Inhaler                Primary Care Provider Office Phone # Fax #    Kelvin Keenan [phone]       [address] [address]        Thank you!     Thank you for choosing [facility]  for your care. Our goal is always to provide you with excellent care. Hearing back from our patients is one way we can continue to improve our services. Please take a few minutes to complete the written survey that you may receive in the mail after your visit with us. Thank you!             Your Updated Medication List - Protect others around you: Learn how to safely use, store and throw away your medicines at www.disposemymeds.org.          This list is accurate as of: 6/6/17 10:53 AM.  Always use your most recent med list.                   Brand Name Dispense Instructions for  use    * albuterol (2.5 MG/3ML) 0.083% neb solution     3 mL    Take 1 vial (2.5 mg) by nebulization once       * albuterol (2.5 MG/3ML) 0.083% neb solution     25 vial    Take 1 vial (2.5 mg) by nebulization every 6 hours as needed for shortness of breath / dyspnea or wheezing       * albuterol 108 (90 BASE) MCG/ACT Inhaler    PROAIR HFA/PROVENTIL HFA/VENTOLIN HFA    1 Inhaler    Inhale 2 puffs into the lungs every 6 hours as needed for shortness of breath / dyspnea or wheezing       ALLERGEN IMMUNOTHERAPY PRESCRIPTION     5 mL    Mix #3  Tree , Weeds [place], White  GLY1:20 w/v, HS  0.3ml Birch Mix GLY1:20 w/v, HS  0.3ml [place] GLY1:20 w/v, HS  0.3ml Elm, American GLY1:20 w/v, HS  0.3ml [place], Black GLY1:20 w/v 0.3ml [place] GLY 1:20 w/v, HS 0.3ml [place] GLY 1:20 w/v, ALK 0.3ml [place]-Povertyweed GLY 1:20 w/v, HS 0.3ml Nettle GLY 1:20 w/v, HS 0.3ml Ragweed, Short GLY 1:20 w/v HS 0.3ml Russian Thistle GLY 1:20 w/v, HS 0.3ml Diluent: HSAqs to 5ml       amLODIPine 5 MG tablet    NORVASC    180 tablet    TAKE 2 TABLETS EVERY DAY       aspirin 81 MG tablet      Take 1 tablet by mouth daily.       atorvastatin 20 MG tablet    LIPITOR    90 tablet    Take 1 tablet (20 mg) by mouth daily       benzonatate 100 MG capsule    TESSALON PERLES    42 capsule    Take 1 capsule (100 mg) by mouth 3 times daily as needed for cough       blood glucose monitoring test strip    no brand specified    2 Box    1 strip by In Vitro route daily Test 1x daily. Accu-Chek Marlene test strips       calcium carb 1250 mg (500 mg [place])/vitamin D 200 unit 500-200 MG-UNIT per tablet    OSCAL with D     Take one tablet twice daily with food       chlorthalidone 25 MG tablet    HYGROTON    90 tablet    Take 1 tablet (25 mg) by mouth daily       EPINEPHrine 0.3 MG/0.3ML injection     0.6 mL    Inject 0.3 mLs (0.3 mg) into the muscle as needed for anaphylaxis       Ferrous Gluconate 256 (28 FE) MG Tabs      Take 28 mg by mouth daily        glipiZIDE 5 MG 24 hr tablet    glipiZIDE XL    90 tablet    Take 1 tablet (5 mg) by mouth daily       ipratropium - albuterol 0.5 mg/2.5 mg/3 mL 0.5-2.5 (3) MG/3ML neb solution    DUONEB    1 vial    Take 1 vial (3 mLs) by nebulization once for 1 dose       lisinopril 40 MG tablet    PRINIVIL/ZESTRIL    180 tablet    Take 2 tablets (80 mg) by mouth daily       magnesium 500 MG Tabs      Take 500 mg by mouth daily       metFORMIN 500 MG tablet    GLUCOPHAGE    360 tablet    TAKE TWO TABLETS TWICE DAILY, WITH FOOD.       order for DME     1 Units    Nebulizer       Potassium Gluconate 595 MG Caps      Take 595 mg by mouth daily       * predniSONE 20 MG tablet    DELTASONE    10 tablet    Take one tablet twice a day for 5 days.       * predniSONE 20 MG tablet    DELTASONE    10 tablet    Take one tablet twice a day for 5 days.       Selenium 200 MCG Tabs      Take 200 mcg by mouth daily       tamsulosin 0.4 MG capsule    FLOMAX     Take 0.4 mg by mouth daily       VITAMIN B COMPLEX PO      Take 1 tablet by mouth daily.       vitamin D 1000 UNITS capsule      Take 1 capsule by mouth daily.       * Notice:  This list has 5 medication(s) that are the same as other medications prescribed for you. Read the directions carefully, and ask your doctor or other care provider to review them with you.

## 2017-07-15 ENCOUNTER — HEALTH MAINTENANCE LETTER (OUTPATIENT)
Age: 72
End: 2017-07-15

## 2017-07-21 DIAGNOSIS — N18.1 CKD (CHRONIC KIDNEY DISEASE) STAGE 1, GFR 90 ML/MIN OR GREATER: ICD-10-CM

## 2017-07-21 RX ORDER — LISINOPRIL 40 MG/1
TABLET ORAL
Qty: 180 TABLET | Refills: 0 | Status: SHIPPED | OUTPATIENT
Start: 2017-07-21 | End: 2017-11-21

## 2017-07-21 NOTE — TELEPHONE ENCOUNTER
lisinopril (PRINIVIL/ZESTRIL) 40 MG tablet      Last Written Prescription Date: 2/7/2017  Last Fill Quantity: 180, # refills: 1  Last Office Visit with G, P or [facility] prescribing provider: 6/6/2017       Potassium   Date Value Ref Range Status   04/12/2017 4.1 3.4 - 5.3 mmol/L Final     Creatinine   Date Value Ref Range Status   04/12/2017 1.13 0.66 - 1.25 mg/dL Final     BP Readings from Last 3 Encounters:   06/06/17 110/64   05/26/17 134/60   04/26/17 142/75

## 2017-09-05 DIAGNOSIS — E87.1 HYPONATREMIA: ICD-10-CM

## 2017-09-05 LAB
ANION GAP SERPL CALCULATED.3IONS-SCNC: 8 MMOL/L (ref 3–14)
BUN SERPL-MCNC: 17 MG/DL (ref 7–30)
CALCIUM SERPL-MCNC: 9.3 MG/DL (ref 8.5–10.1)
CHLORIDE SERPL-SCNC: 98 MMOL/L (ref 94–109)
CO2 SERPL-SCNC: 24 MMOL/L (ref 20–32)
CREAT SERPL-MCNC: 1.09 MG/DL (ref 0.66–1.25)
GFR SERPL CREATININE-BSD FRML MDRD: 67 ML/MIN/1.7M2
GLUCOSE SERPL-MCNC: 162 MG/DL (ref 70–99)
POTASSIUM SERPL-SCNC: 4.7 MMOL/L (ref 3.4–5.3)
SODIUM SERPL-SCNC: 130 MMOL/L (ref 133–144)

## 2017-09-05 PROCEDURE — 80048 BASIC METABOLIC PNL TOTAL CA: CPT | Performed by: FAMILY MEDICINE

## 2017-09-05 PROCEDURE — 36415 COLL VENOUS BLD VENIPUNCTURE: CPT | Performed by: FAMILY MEDICINE

## 2017-09-19 ENCOUNTER — OFFICE VISIT (OUTPATIENT)
Dept: FAMILY MEDICINE | Facility: CLINIC | Age: 72
End: 2017-09-19
Payer: COMMERCIAL

## 2017-09-19 VITALS
BODY MASS INDEX: 30.08 KG/M2 | HEART RATE: 60 BPM | HEIGHT: 66 IN | WEIGHT: 187.2 LBS | DIASTOLIC BLOOD PRESSURE: 71 MMHG | TEMPERATURE: 97.4 F | SYSTOLIC BLOOD PRESSURE: 156 MMHG

## 2017-09-19 DIAGNOSIS — E78.5 HYPERLIPIDEMIA LDL GOAL <100: ICD-10-CM

## 2017-09-19 DIAGNOSIS — E11.21 TYPE 2 DIABETES MELLITUS WITH DIABETIC NEPHROPATHY, WITHOUT LONG-TERM CURRENT USE OF INSULIN (H): ICD-10-CM

## 2017-09-19 DIAGNOSIS — Z23 NEED FOR PROPHYLACTIC VACCINATION AND INOCULATION AGAINST INFLUENZA: Primary | ICD-10-CM

## 2017-09-19 DIAGNOSIS — E87.1 HYPONATREMIA: ICD-10-CM

## 2017-09-19 PROCEDURE — 99213 OFFICE O/P EST LOW 20 MIN: CPT | Mod: 25 | Performed by: FAMILY MEDICINE

## 2017-09-19 PROCEDURE — 90662 IIV NO PRSV INCREASED AG IM: CPT | Performed by: FAMILY MEDICINE

## 2017-09-19 PROCEDURE — G0008 ADMIN INFLUENZA VIRUS VAC: HCPCS | Performed by: FAMILY MEDICINE

## 2017-09-19 NOTE — PROGRESS NOTES
SUBJECTIVE:   Sae Milligan is a 71 year old male who presents to clinic today for the following health issues: he is doing well today. Home bp is normal.  Recent Na was low. Has stopped the hygroton     Patient is here to discuss recent labs and low sodium levels.        Problem list and histories reviewed & adjusted, as indicated.  Additional history: as documented    Patient Active Problem List   Diagnosis     Type 2 diabetes mellitus with diabetic nephropathy (H)     Hyperlipidemia LDL goal <100     Other chronic allergic conjunctivitis     DJD (degenerative joint disease)     Health Care Home     Allergic rhinitis     CKD (chronic kidney disease) stage 1, GFR 90 ml/min or greater     Squamous cell carcinoma     History of squamous cell carcinoma     SK (seborrheic keratosis)     Lentigo     Family history of prostate cancer     Restless leg syndrome     Anxiety     Past Surgical History:   Procedure Laterality Date     ARTHROPLASTY KNEE  2/10/2011    ARTHROPLASTY KNEE performed by DARLING FARRELL at [facility]     MOHS MICROGRAPHIC PROCEDURE  july 2012    Sq. cell left helix     ORTHOPEDIC SURGERY       SURGICAL HISTORY OF -   12/2003    Cervical spine fx       Social History   Substance Use Topics     Smoking status: Former Smoker     Quit date: 3/14/1990     Smokeless tobacco: Never Used     Alcohol use Yes      Comment: occasional     Family History   Problem Relation Age of Onset     Hypertension Mother      CEREBROVASCULAR DISEASE Mother      Hypertension Father      CEREBROVASCULAR DISEASE Father      DIABETES Brother      Prostate Cancer Brother      Prostate Cancer Brother          Current Outpatient Prescriptions   Medication Sig Dispense Refill     lisinopril (PRINIVIL/ZESTRIL) 40 MG tablet TAKE 2 TABLETS EVERY  tablet 0     albuterol (PROAIR HFA/PROVENTIL HFA/VENTOLIN HFA) 108 (90 BASE) MCG/ACT Inhaler Inhale 2 puffs into the lungs every 6 hours as needed for shortness of breath / dyspnea or  wheezing 1 Inhaler 0     ipratropium - albuterol 0.5 mg/2.5 mg/3 mL (DUONEB) 0.5-2.5 (3) MG/3ML neb solution Take 1 vial (3 mLs) by nebulization once for 1 dose 1 vial 0     order for DME Nebulizer 1 Units 0     albuterol (2.5 MG/3ML) 0.083% neb solution Take 1 vial (2.5 mg) by nebulization every 6 hours as needed for shortness of breath / dyspnea or wheezing 25 vial 0     amLODIPine (NORVASC) 5 MG tablet TAKE 2 TABLETS EVERY DAY (Patient taking differently: take 1 a day) 180 tablet 1     albuterol (2.5 MG/3ML) 0.083% neb solution Take 1 vial (2.5 mg) by nebulization once 3 mL 0     benzonatate (TESSALON PERLES) 100 MG capsule Take 1 capsule (100 mg) by mouth 3 times daily as needed for cough 42 capsule 0     Selenium 200 MCG TABS Take 200 mcg by mouth daily       magnesium 500 MG TABS Take 500 mg by mouth daily       Potassium Gluconate 595 MG CAPS Take 595 mg by mouth daily       blood glucose monitoring (NO BRAND SPECIFIED) test strip 1 strip by In Vitro route daily Test 1x daily. Accu-Chek Marlene test strips 2 Box 3     tamsulosin (FLOMAX) 0.4 MG capsule Take 0.4 mg by mouth daily       glipiZIDE (GLIPIZIDE XL) 5 MG 24 hr tablet Take 1 tablet (5 mg) by mouth daily 90 tablet 3     atorvastatin (LIPITOR) 20 MG tablet Take 1 tablet (20 mg) by mouth daily 90 tablet 3     metFORMIN (GLUCOPHAGE) 500 MG tablet TAKE TWO TABLETS TWICE DAILY, WITH FOOD. 360 tablet 3     ORDER FOR ALLERGEN IMMUNOTHERAPY Mix #3  Tree , Weeds  Mt, White  GLY1:20 w/v, HS  0.3ml  Birch Mix GLY1:20 w/v, HS  0.3ml  [place] GLY1:20 w/v, HS  0.3ml  Elm, American GLY1:20 w/v, HS  0.3ml  [place], Black GLY1:20 w/v 0.3ml  [place] GLY 1:20 w/v, HS 0.3ml  Lamb's Quarters GLY 1:20 w/v, ALK 0.3ml  Marshelder-Povertyweed GLY 1:20 w/v, HS 0.3ml  Nettle GLY 1:20 w/v, HS 0.3ml  Ragweed, Short GLY 1:20 w/v HS 0.3ml  Russian Thistle GLY 1:20 w/v, HS 0.3ml  Diluent: HSAqs to 5ml 5 mL PRN     aspirin 81 MG tablet Take 1 tablet by mouth daily.        Cholecalciferol (VITAMIN D) 1000 UNIT capsule Take 1 capsule by mouth daily.       B Complex Vitamins (VITAMIN B COMPLEX PO) Take 1 tablet by mouth daily.       CALCIUM CARBONATE-VITAMIN D 500-200 MG-UNIT OR TABS Take one tablet twice daily with food       predniSONE (DELTASONE) 20 MG tablet Take one tablet twice a day for 5 days. (Patient not taking: Reported on 9/19/2017) 10 tablet 0     predniSONE (DELTASONE) 20 MG tablet Take one tablet twice a day for 5 days. (Patient not taking: Reported on 9/19/2017) 10 tablet 0     Ferrous Gluconate 256 (28 FE) MG TABS Take 28 mg by mouth daily       chlorthalidone (HYGROTON) 25 MG tablet Take 1 tablet (25 mg) by mouth daily 90 tablet 3     EPINEPHrine (EPIPEN) 0.3 MG/0.3ML injection Inject 0.3 mLs (0.3 mg) into the muscle as needed for anaphylaxis (Patient not taking: Reported on 9/19/2017) 0.6 mL 2     Recent Labs   Lab Test  09/05/17   0905  04/12/17   1401   04/03/17   0919  09/29/16   0814   04/15/16   0811  10/06/15   0743   06/11/14   0740   11/06/13   0832   04/25/13   0937   01/10/12   0925   A1C   --    --    --   5.8  5.4   --   6.2*  6.1*   < >  5.8   --   6.1*   --   6.4*   < >  6.1*   LDL   --    --    --    --   30   --    --   50   --   71   --   91   < >  112   < >  92   HDL   --    --    --    --   68   --    --   53   --   50   --   47   < >  55   < >  50   TRIG   --    --    --    --   57   --    --   67   --   47   --   80   < >  99   < >  75   ALT   --    --    --    --    --    --    --    --    --    --    --   41   --   39   --   31   CR  1.09  1.13   < >   --    --    < >   --   0.88   < >  0.92   < >  0.89   --   0.89   < >  0.86   GFRESTIMATED  67  64   < >   --    --    < >   --   85   < >  82   < >  85   --   85   < >  89   GFRESTBLACK  81  77   < >   --    --    < >   --   >90   GFR Calc     < >  >90   < >  >90   --   >90   < >  >90   POTASSIUM  4.7  4.1   < >   --    --    < >   --   5.0   < >  5.5*   < >  5.3   --   4.9   < >   "4.9   TSH   --    --    --    --   2.82   --    --    --    --   1.61   --    --    --    --    --   1.41    < > = values in this interval not displayed.      BP Readings from Last 3 Encounters:   09/19/17 156/71   06/06/17 110/64   05/26/17 134/60    Wt Readings from Last 3 Encounters:   09/19/17 187 lb 3.2 oz (84.9 kg)   06/06/17 179 lb (81.2 kg)   05/26/17 184 lb (83.5 kg)                          Reviewed and updated as needed this visit by clinical staffTobacco  Allergies  Meds  Med Hx  Surg Hx  Fam Hx  Soc Hx      Reviewed and updated as needed this visit by Provider         ROS:  C: NEGATIVE for fever, chills, change in weight  E/M: NEGATIVE for ear, mouth and throat problems  R: NEGATIVE for significant cough or SOB  CV: NEGATIVE for chest pain, palpitations or peripheral edema    OBJECTIVE:     /71  Pulse 60  Temp 97.4  F (36.3  C) (Tympanic)  Ht 5' 6\" (1.676 m)  Wt 187 lb 3.2 oz (84.9 kg)  BMI 30.21 kg/m2  Body mass index is 30.21 kg/(m^2).  GENERAL: healthy, alert and no distress  NECK: no adenopathy, no asymmetry, masses, or scars and thyroid normal to palpation  RESP: lungs clear to auscultation - no rales, rhonchi or wheezes  CV: regular rate and rhythm, normal S1 S2, no S3 or S4, no murmur, click or rub, no peripheral edema and peripheral pulses strong  ABDOMEN: soft, nontender, no hepatosplenomegaly, no masses and bowel sounds normal  MS: no gross musculoskeletal defects noted, no edema        ASSESSMENT/PLAN:     ASSESSMENT/PLAN:      ICD-10-CM    1. Need for prophylactic vaccination and inoculation against influenza Z23 FLU VACCINE, INCREASED ANTIGEN, PRESV FREE, AGE 65+ [36346]     ADMIN INFLUENZA (For MEDICARE Patients ONLY) []     Electrolyte panel (Na, K, Cl, CO2, Anion gap)   2. Type 2 diabetes mellitus with diabetic nephropathy, without long-term current use of insulin (H) E11.21    3. Hyperlipidemia LDL goal <100 E78.5    4. Hyponatremia E87.1        Patient " Instructions   1. Sodium down just a little.    2. Lets just watch for now . How on salt.     3. Lets check level of sodium in November.                   Injectable Influenza Immunization Documentation    1.  Is the person to be vaccinated sick today? No    2. Does the person to be vaccinated have an allergy to a component   of the vaccine? No    3. Has the person to be vaccinated ever had a serious reaction   to influenza vaccine in the past? No    4. Has the person to be vaccinated ever had Guillain-Barré syndrome? No    Form completed by Radhika Zaragoza CMA

## 2017-09-19 NOTE — MR AVS SNAPSHOT
After Visit Summary   9/19/2017    Sae Milligan    MRN: [id number]           Patient Information     Date Of Birth          1945        Visit Information        Provider Department      9/19/2017 10:00 AM Kelvin Keenan MD [facility]        Today's Diagnoses     Need for prophylactic vaccination and inoculation against influenza    -  1      Care Instructions    1. Sodium down just a little.    2. Lets just watch for now . How on salt.     3. Lets check level of sodium in November.           Follow-ups after your visit        Future tests that were ordered for you today     Open Future Orders        Priority Expected Expires Ordered    Electrolyte panel (Na, K, Cl, CO2, Anion gap) Routine  12/19/2017 9/19/2017            Who to contact     If you have questions or need follow up information about today's clinic visit or your schedule please contact [facility] directly at [phone].  Normal or non-critical lab and imaging results will be communicated to you by [employer], letter or phone within 4 business days after the clinic has received the results. If you do not hear from us within 7 days, please contact the clinic through [employer] or phone. If you have a critical or abnormal lab result, we will notify you by phone as soon as possible.  Submit refill requests through [employer] or call your pharmacy and they will forward the refill request to us. Please allow 3 business days for your refill to be completed.          Additional Information About Your Visit        MyChart Information     [employer] gives you secure access to your electronic health record. If you see a primary care provider, you can also send messages to your care team and make appointments. If you have questions, please call your primary care clinic.  If you do not have a primary care provider, please call [phone] and they will assist you.        Care EveryWhere ID     This is  "your Care EveryWhere ID. This could be used by other organizations to access your [facility] medical records  [id number]        Your Vitals Were     Pulse Temperature Height BMI (Body Mass Index)          60 97.4  F (36.3  C) (Tympanic) 5' 6\" (1.676 m) 30.21 kg/m2         Blood Pressure from Last 3 Encounters:   09/19/17 156/71   06/06/17 110/64   05/26/17 134/60    Weight from Last 3 Encounters:   09/19/17 187 lb 3.2 oz (84.9 kg)   06/06/17 179 lb (81.2 kg)   05/26/17 184 lb (83.5 kg)              We Performed the Following     ADMIN INFLUENZA (For MEDICARE Patients ONLY) []     FLU VACCINE, INCREASED ANTIGEN, PRESV FREE, AGE 65+ [94354]          Today's Medication Changes          These changes are accurate as of: 9/19/17 10:36 AM.  If you have any questions, ask your nurse or doctor.               These medicines have changed or have updated prescriptions.        Dose/Directions    amLODIPine 5 MG tablet   Commonly known as:  NORVASC   This may have changed:  See the new instructions.   Used for:  Benign essential hypertension        TAKE 2 TABLETS EVERY DAY   Quantity:  180 tablet   Refills:  1                Primary Care Provider Office Phone # Fax #    Kelvin Keenan [phone]       [address] [place], [place] [address]        Equal Access to Services     KACEY GARLAND AH: Hadmarilyn jay Sokristina, waaxda luqadaha, qaybta kaalmada becki, lindsey saldivar. So [facility] [phone].    ATENCIÓN: Si habla español, tiene a wang disposición servicios gratuitos de asistencia lingüística. Vikas al [phone].    We comply with applicable federal civil rights laws and [place] laws. We do not discriminate on the basis of race, color, national origin, age, disability sex, sexual orientation or gender identity.            Thank you!     Thank you for choosing [facility]  for your care. Our goal is always to provide you with " excellent care. Hearing back from our patients is one way we can continue to improve our services. Please take a few minutes to complete the written survey that you may receive in the mail after your visit with us. Thank you!             Your Updated Medication List - Protect others around you: Learn how to safely use, store and throw away your medicines at www.disposemymeds.org.          This list is accurate as of: 9/19/17 10:36 AM.  Always use your most recent med list.                   Brand Name Dispense Instructions for use Diagnosis    * albuterol (2.5 MG/3ML) 0.083% neb solution     3 mL    Take 1 vial (2.5 mg) by nebulization once        * albuterol (2.5 MG/3ML) 0.083% neb solution     25 vial    Take 1 vial (2.5 mg) by nebulization every 6 hours as needed for shortness of breath / dyspnea or wheezing    Acute bronchitis with symptoms > 10 days       * albuterol 108 (90 BASE) MCG/ACT Inhaler    PROAIR HFA/PROVENTIL HFA/VENTOLIN HFA    1 Inhaler    Inhale 2 puffs into the lungs every 6 hours as needed for shortness of breath / dyspnea or wheezing    Acute bronchitis, unspecified organism       ALLERGEN IMMUNOTHERAPY PRESCRIPTION     5 mL    Mix #3  Tree , [place], [place]  GLY1:20 w/v, HS  0.3ml Birch Mix GLY1:20 w/v, HS  0.3ml [place] GLY1:20 w/v, HS  0.3ml [place]m, American GLY1:20 w/v, HS  0.3ml [place], Black GLY1:20 w/v 0.3ml [place] GLY 1:20 w/v, HS 0.3ml [place] GLY 1:20 w/v, ALK 0.3ml [place] GLY 1:20 w/v, HS 0.3ml Nettle GLY 1:20 w/v, HS 0.3ml Ragweed, Short GLY 1:20 w/v HS 0.3ml [place] Thistle GLY 1:20 w/v, HS 0.3ml Diluent: HSAqs to 5ml    Chronic rhinitis       amLODIPine 5 MG tablet    NORVASC    180 tablet    TAKE 2 TABLETS EVERY DAY    Benign essential hypertension       aspirin 81 MG tablet      Take 1 tablet by mouth daily.        atorvastatin 20 MG tablet    LIPITOR    90 tablet    Take 1 tablet (20 mg) by mouth daily    Pure hypercholesterolemia, Type 2 diabetes,  HbA1C goal < 8% (H)       benzonatate 100 MG capsule    SALIMA WESLEY    42 capsule    Take 1 capsule (100 mg) by mouth 3 times daily as needed for cough    Acute bronchitis, unspecified organism       blood glucose monitoring test strip    no brand specified    2 Box    1 strip by In Vitro route daily Test 1x daily. Accu-Chek Marlene test strips    Type 2 diabetes mellitus with diabetic nephropathy, with long-term current use of insulin (H)       calcium carb 1250 mg (500 mg [place])/vitamin D 200 unit 500-200 MG-UNIT per tablet    OSCAL with D     Take one tablet twice daily with food        chlorthalidone 25 MG tablet    HYGROTON    90 tablet    Take 1 tablet (25 mg) by mouth daily    Benign essential hypertension       EPINEPHrine 0.3 MG/0.3ML injection 2-pack    EPIPEN/ADRENACLICK/or ANY BX GENERIC EQUIV    0.6 mL    Inject 0.3 mLs (0.3 mg) into the muscle as needed for anaphylaxis    Need for desensitization to allergens       Ferrous Gluconate 256 (28 FE) MG Tabs      Take 28 mg by mouth daily        glipiZIDE 5 MG 24 hr tablet    glipiZIDE XL    90 tablet    Take 1 tablet (5 mg) by mouth daily    Type 2 diabetes mellitus with diabetic nephropathy, with long-term current use of insulin (H)       ipratropium - albuterol 0.5 mg/2.5 mg/3 mL 0.5-2.5 (3) MG/3ML neb solution    DUONEB    1 vial    Take 1 vial (3 mLs) by nebulization once for 1 dose        lisinopril 40 MG tablet    PRINIVIL/ZESTRIL    180 tablet    TAKE 2 TABLETS EVERY DAY    CKD (chronic kidney disease) stage 1, GFR 90 ml/min or greater       magnesium 500 MG Tabs      Take 500 mg by mouth daily        metFORMIN 500 MG tablet    GLUCOPHAGE    360 tablet    TAKE TWO TABLETS TWICE DAILY, WITH FOOD.    Type 2 diabetes mellitus with diabetic nephropathy, with long-term current use of insulin (H)       order for DME     1 Units    Nebulizer    Acute bronchitis with symptoms > 10 days       Potassium Gluconate 595 MG Caps      Take 595 mg by mouth daily         * predniSONE 20 MG tablet    DELTASONE    10 tablet    Take one tablet twice a day for 5 days.    Acute bronchitis with symptoms > 10 days       * predniSONE 20 MG tablet    DELTASONE    10 tablet    Take one tablet twice a day for 5 days.        Selenium 200 MCG Tabs      Take 200 mcg by mouth daily        tamsulosin 0.4 MG capsule    FLOMAX     Take 0.4 mg by mouth daily        VITAMIN B COMPLEX PO      Take 1 tablet by mouth daily.        vitamin D 1000 UNITS capsule      Take 1 capsule by mouth daily.        * Notice:  This list has 5 medication(s) that are the same as other medications prescribed for you. Read the directions carefully, and ask your doctor or other care provider to review them with you.

## 2017-09-19 NOTE — PATIENT INSTRUCTIONS
1. Sodium down just a little.    2. Lets just watch for now . How on salt.     3. Lets check level of sodium in November.

## 2017-09-19 NOTE — NURSING NOTE
"Chief Complaint   Patient presents with     Results     Patient is here to discuss low sodium.       Initial /73  Pulse 60  Temp 97.4  F (36.3  C) (Tympanic)  Ht 5' 6\" (1.676 m)  Wt 187 lb 3.2 oz (84.9 kg)  BMI 30.21 kg/m2 Estimated body mass index is 30.21 kg/(m^2) as calculated from the following:    Height as of this encounter: 5' 6\" (1.676 m).    Weight as of this encounter: 187 lb 3.2 oz (84.9 kg).  Medication Reconciliation: complete   Radhika Zaragoza CMA      "

## 2017-10-14 ENCOUNTER — OFFICE VISIT (OUTPATIENT)
Dept: URGENT CARE | Facility: URGENT CARE | Age: 72
End: 2017-10-14
Payer: COMMERCIAL

## 2017-10-14 ENCOUNTER — RADIANT APPOINTMENT (OUTPATIENT)
Dept: GENERAL RADIOLOGY | Facility: CLINIC | Age: 72
End: 2017-10-14
Attending: NURSE PRACTITIONER
Payer: COMMERCIAL

## 2017-10-14 VITALS
SYSTOLIC BLOOD PRESSURE: 156 MMHG | DIASTOLIC BLOOD PRESSURE: 77 MMHG | RESPIRATION RATE: 20 BRPM | BODY MASS INDEX: 29.76 KG/M2 | HEART RATE: 67 BPM | WEIGHT: 184.4 LBS | TEMPERATURE: 98.5 F

## 2017-10-14 DIAGNOSIS — S49.91XA SHOULDER INJURY, RIGHT, INITIAL ENCOUNTER: ICD-10-CM

## 2017-10-14 DIAGNOSIS — S46.011A STRAIN OF RIGHT ROTATOR CUFF CAPSULE, INITIAL ENCOUNTER: Primary | ICD-10-CM

## 2017-10-14 PROCEDURE — 99214 OFFICE O/P EST MOD 30 MIN: CPT | Performed by: NURSE PRACTITIONER

## 2017-10-14 PROCEDURE — 73030 X-RAY EXAM OF SHOULDER: CPT | Mod: RT

## 2017-10-14 RX ORDER — HYDROCODONE BITARTRATE AND ACETAMINOPHEN 5; 325 MG/1; MG/1
1 TABLET ORAL EVERY 6 HOURS PRN
Qty: 20 TABLET | Refills: 0 | Status: SHIPPED | OUTPATIENT
Start: 2017-10-14 | End: 2017-11-21

## 2017-10-14 NOTE — PATIENT INSTRUCTIONS
Rest the affected painful area as much as possible.  Apply ice for 15-20 minutes intermittently as needed and especially after any offending activity.    Daily stretching.  As pain recedes, begin normal activities slowly as tolerated.      Avoid rapid or heavy exertion for now. Activity as tolerated     Gentle stretching two to three times daily just to keep from stiffening up.      Alternate ice and heat, 20 minutes each for 2-3 cycles.  Gel packs work best for cold. Uncooked rice is best for heat.  Fill an old, clean sock and tie or sew up.  Microwave for 30-45 seconds. Careful not to burn.    Gradually ease back into activity as it gets better.       Physical Therapy if symptoms not better with symptomatic care.       Shoulder Sprain  A sprain is a stretching or tearing of the ligaments that hold a joint together. A sprain may take up to 8 weeks to fully heal, depending on how severe it is. Moderate to severe shoulder sprains are treated with a sling or shoulder immobilizer. Minor sprains can be treated without any special support.  Home care  The following guidelines will help you care for your injury at home:    If a sling was given to you, leave it in place for the time advised by your healthcare provider. If you aren t sure how long to wear it, ask for advice. If the sling becomes loose, adjust it so that your forearm is level with the ground. Your shoulder should feel well supported.    Put an ice pack on the injured area for 20 minutes every 1 to 2 hours the first day. You can make your own ice pack by putting ice cubes in a plastic bag. A bag of frozen peas or something similar works well too. Wrap the bag in a thin towel. Continue with ice packs 3 to 4 times a day for the next 2 to 3 days. Then use the pack as needed to ease pain and swelling.    You may use acetaminophen or ibuprofen to control pain, unless another pain medicine was prescribed. If you have chronic liver or kidney disease, talk with your  healthcare provider before using these medicines. Also talk with your provider if you ve had a stomach ulcer or gastrointestinal bleeding.    Shoulder joints become stiff if left in a sling for too long. You should start range of motion exercises about 7 to 10 days after the injury. Talk with your provider to find out what type of exercises to do and how soon to start.  Follow-up care  Follow up with your healthcare provider, or as advised.  Any X-rays you had today don t show any broken bones, breaks, or fractures. Sometimes fractures don t show up on the first X-ray. Bruises and sprains can sometimes hurt as much as a fracture. These injuries can take time to heal completely. If your symptoms don t improve or they get worse, talk with your provider. You may need a repeat X-ray or other treatments.  When to seek medical advice  Call your healthcare provider right away if any of these occur:    Shoulder pain or swelling in your arm that gets worse    Fingers become cold, blue, numb, or tingly    Large amount of bruising of the shoulder or upper arm    Fever or chills  Date Last Reviewed: 8/1/2016 2000-2017 [employer] [address] [place] [address]. All rights reserved. This information is not intended as a substitute for professional medical care. Always follow your healthcare professional's instructions.

## 2017-10-14 NOTE — NURSING NOTE
"Chief Complaint   Patient presents with     Shoulder Pain     Yesterday tripped and fell backwards on right shoulder.  Moving- very sore.  Stiff. Unable to sleep last night.  7-8/10.  No meds.  No previous hx of injury to that shoulder.       Initial /77 (BP Location: Left arm, Cuff Size: Adult Regular)  Pulse 67  Temp 98.5  F (36.9  C) (Tympanic)  Resp 20  Wt 184 lb 6.4 oz (83.6 kg)  BMI 29.76 kg/m2 Estimated body mass index is 29.76 kg/(m^2) as calculated from the following:    Height as of 9/19/17: 5' 6\" (1.676 m).    Weight as of this encounter: 184 lb 6.4 oz (83.6 kg).  Medication Reconciliation: complete    Health Maintenance that is potentially due pending provider review:  NONE    n/a    Is there anyone who you would like to be able to receive your results? Not Applicable  If yes have patient fill out LORNE Sanders M.A.    "

## 2017-10-14 NOTE — MR AVS SNAPSHOT
After Visit Summary   10/14/2017    Sae Milligan    MRN: [id number]           Patient Information     Date Of Birth          1945        Visit Information        Provider Department      10/14/2017 4:05 PM Dionna Velez APRN [facility]        Today's Diagnoses     Shoulder injury, right, initial encounter    -  1    Strain of right rotator cuff capsule, initial encounter          Care Instructions    Rest the affected painful area as much as possible.  Apply ice for 15-20 minutes intermittently as needed and especially after any offending activity.    Daily stretching.  As pain recedes, begin normal activities slowly as tolerated.      Avoid rapid or heavy exertion for now. Activity as tolerated     Gentle stretching two to three times daily just to keep from stiffening up.      Alternate ice and heat, 20 minutes each for 2-3 cycles.  Gel packs work best for cold. Uncooked rice is best for heat.  Fill an old, clean sock and tie or sew up.  Microwave for 30-45 seconds. Careful not to burn.    Gradually ease back into activity as it gets better.       Physical Therapy if symptoms not better with symptomatic care.       Shoulder Sprain  A sprain is a stretching or tearing of the ligaments that hold a joint together. A sprain may take up to 8 weeks to fully heal, depending on how severe it is. Moderate to severe shoulder sprains are treated with a sling or shoulder immobilizer. Minor sprains can be treated without any special support.  Home care  The following guidelines will help you care for your injury at home:    If a sling was given to you, leave it in place for the time advised by your healthcare provider. If you aren t sure how long to wear it, ask for advice. If the sling becomes loose, adjust it so that your forearm is level with the ground. Your shoulder should feel well supported.    Put an ice pack on the injured area for 20 minutes every 1 to 2  hours the first day. You can make your own ice pack by putting ice cubes in a plastic bag. A bag of frozen peas or something similar works well too. Wrap the bag in a thin towel. Continue with ice packs 3 to 4 times a day for the next 2 to 3 days. Then use the pack as needed to ease pain and swelling.    You may use acetaminophen or ibuprofen to control pain, unless another pain medicine was prescribed. If you have chronic liver or kidney disease, talk with your healthcare provider before using these medicines. Also talk with your provider if you ve had a stomach ulcer or gastrointestinal bleeding.    Shoulder joints become stiff if left in a sling for too long. You should start range of motion exercises about 7 to 10 days after the injury. Talk with your provider to find out what type of exercises to do and how soon to start.  Follow-up care  Follow up with your healthcare provider, or as advised.  Any X-rays you had today don t show any broken bones, breaks, or fractures. Sometimes fractures don t show up on the first X-ray. Bruises and sprains can sometimes hurt as much as a fracture. These injuries can take time to heal completely. If your symptoms don t improve or they get worse, talk with your provider. You may need a repeat X-ray or other treatments.  When to seek medical advice  Call your healthcare provider right away if any of these occur:    Shoulder pain or swelling in your arm that gets worse    Fingers become cold, blue, numb, or tingly    Large amount of bruising of the shoulder or upper arm    Fever or chills  Date Last Reviewed: 8/1/2016 2000-2017 [employer]. [address] [place] [address], [place] [address]. All rights reserved. This information is not intended as a substitute for professional medical care. Always follow your healthcare professional's instructions.                Follow-ups after your visit        Additional Services     PHYSICAL THERAPY REFERRAL       *This therapy  "referral will be filtered to a centralized scheduling office at [facility] and the patient will receive a call to schedule an appointment at a [facility] location most convenient for them. *     [facility] provides Physical Therapy evaluation and treatment and many specialty services across the [facility] system.  If requesting a specialty program, please choose from the list below.    If you have not heard from the scheduling office within 2 business days, please call [phone] for all locations, with the exception of [place], please call [phone].  Treatment: Evaluation & Treatment  Special Instructions/Modalities: evaluate and treat   Special Programs: none  Please be aware that coverage of these services is subject to the terms and limitations of your health insurance plan.  Call member services at your health plan with any benefit or coverage questions.      **Note to Provider:  If you are referring outside of [facility] for the therapy appointment, please list the name of the location in the \"special instructions\" above, print the referral and give to the patient to schedule the appointment.                  Who to contact     If you have questions or need follow up information about today's clinic visit or your schedule please contact [facility] directly at [phone].  Normal or non-critical lab and imaging results will be communicated to you by MyChart, letter or phone within 4 business days after the clinic has received the results. If you do not hear from us within 7 days, please contact the clinic through MyChart or phone. If you have a critical or abnormal lab result, we will notify you by phone as soon as possible.  Submit refill requests through [employer] or call your pharmacy and they will forward the refill request to us. Please allow 3 business days for your refill to be completed.          Additional Information About Your " Visit        [place] Information     [employer] gives you secure access to your electronic health record. If you see a primary care provider, you can also send messages to your care team and make appointments. If you have questions, please call your primary care clinic.  If you do not have a primary care provider, please call [phone] and they will assist you.        Care EveryWhere ID     This is your Care EveryWhere ID. This could be used by other organizations to access your [place] medical records  [id number]        Your Vitals Were     Pulse Temperature Respirations BMI (Body Mass Index)          67 98.5  F (36.9  C) (Tympanic) 20 29.76 kg/m2         Blood Pressure from Last 3 Encounters:   10/14/17 156/77   09/19/17 156/71   06/06/17 110/64    Weight from Last 3 Encounters:   10/14/17 184 lb 6.4 oz (83.6 kg)   09/19/17 187 lb 3.2 oz (84.9 kg)   06/06/17 179 lb (81.2 kg)              We Performed the Following     PHYSICAL THERAPY REFERRAL          Today's Medication Changes          These changes are accurate as of: 10/14/17  4:55 PM.  If you have any questions, ask your nurse or doctor.               Start taking these medicines.        Dose/Directions    HYDROcodone-acetaminophen 5-325 MG per tablet   Commonly known as:  NORCO   Used for:  Strain of right rotator cuff capsule, initial encounter   Started by:  Dionna Velez APRN CNP        Dose:  1 tablet   Take 1 tablet by mouth every 6 hours as needed for moderate to severe pain maximum 4 tablet(s) per day   Quantity:  20 tablet   Refills:  0         These medicines have changed or have updated prescriptions.        Dose/Directions    amLODIPine 5 MG tablet   Commonly known as:  NORVASC   This may have changed:  See the new instructions.   Used for:  Benign essential hypertension        TAKE 2 TABLETS EVERY DAY   Quantity:  180 tablet   Refills:  1            Where to get your medicines      Some of these will need a paper prescription and others  can be bought over the counter.  Ask your nurse if you have questions.     Bring a paper prescription for each of these medications     HYDROcodone-acetaminophen 5-325 MG per tablet                Primary Care Provider Office Phone # Fax #    Kelvin Keenan [phone]       [address] [facility] [address]        Equal Access to Services     KACEY GARLAND : Hadii aad ku hadasho Soomaali, waaxda luqadaha, qaybta kaalmada adeegyada, waxay idiin hayaan adeeg kaushalyadipatricio lapriti . So [facility] [phone].    ATENCIÓN: Si habla español, tiene a wang disposición servicios gratuitos de asistencia lingüística. Llame al [phone].    We comply with applicable federal civil rights laws and [place] laws. We do not discriminate on the basis of race, color, national origin, age, disability, sex, sexual orientation, or gender identity.            Thank you!     Thank you for choosing [facility]  for your care. Our goal is always to provide you with excellent care. Hearing back from our patients is one way we can continue to improve our services. Please take a few minutes to complete the written survey that you may receive in the mail after your visit with us. Thank you!             Your Updated Medication List - Protect others around you: Learn how to safely use, store and throw away your medicines at www.disposemymeds.org.          This list is accurate as of: 10/14/17  4:55 PM.  Always use your most recent med list.                   Brand Name Dispense Instructions for use Diagnosis    * albuterol (2.5 MG/3ML) 0.083% neb solution     25 vial    Take 1 vial (2.5 mg) by nebulization every 6 hours as needed for shortness of breath / dyspnea or wheezing    Acute bronchitis with symptoms > 10 days       * albuterol 108 (90 BASE) MCG/ACT Inhaler    PROAIR HFA/PROVENTIL HFA/VENTOLIN HFA    1 Inhaler    Inhale 2 puffs into the lungs every 6 hours as needed for  shortness of breath / dyspnea or wheezing    Acute bronchitis, unspecified organism       ALLERGEN IMMUNOTHERAPY PRESCRIPTION     5 mL    Mix #3  Tree , [place], [place]  GLY1:20 w/v, HS  0.3ml Birch Mix GLY1:20 w/v, HS  0.3ml [place] GLY1:20 w/v, HS  0.3ml Elm, American GLY1:20 w/v, HS  0.3ml [place], Black GLY1:20 w/v 0.3ml [place] GLY 1:20 w/v, HS 0.3ml [place] GLY 1:20 w/v, ALK 0.3ml [place] GLY 1:20 w/v, HS 0.3ml Nettle GLY 1:20 w/v, HS 0.3ml Ragweed, Short GLY 1:20 w/v HS 0.3ml Russian Thistle GLY 1:20 w/v, HS 0.3ml Diluent: HSAqs to 5ml    Chronic rhinitis       amLODIPine 5 MG tablet    NORVASC    180 tablet    TAKE 2 TABLETS EVERY DAY    Benign essential hypertension       aspirin 81 MG tablet      Take 1 tablet by mouth daily.        atorvastatin 20 MG tablet    LIPITOR    90 tablet    Take 1 tablet (20 mg) by mouth daily    Pure hypercholesterolemia, Type 2 diabetes, HbA1C goal < 8% (H)       benzonatate 100 MG capsule    TESSALON PERLES    42 capsule    Take 1 capsule (100 mg) by mouth 3 times daily as needed for cough    Acute bronchitis, unspecified organism       blood glucose monitoring test strip    no brand specified    2 Box    1 strip by In Vitro route daily Test 1x daily. Accu-Chek Marlene test strips    Type 2 diabetes mellitus with diabetic nephropathy, with long-term current use of insulin (H)       calcium carb 1250 mg (500 mg [place])/vitamin D 200 unit 500-200 MG-UNIT per tablet    OSCAL with D     Take one tablet twice daily with food        EPINEPHrine 0.3 MG/0.3ML injection 2-pack    EPIPEN/ADRENACLICK/or ANY BX GENERIC EQUIV    0.6 mL    Inject 0.3 mLs (0.3 mg) into the muscle as needed for anaphylaxis    Need for desensitization to allergens       glipiZIDE 5 MG 24 hr tablet    glipiZIDE XL    90 tablet    Take 1 tablet (5 mg) by mouth daily    Type 2 diabetes mellitus with diabetic nephropathy, with long-term current use of insulin (H)        HYDROcodone-acetaminophen 5-325 MG per tablet    NORCO    20 tablet    Take 1 tablet by mouth every 6 hours as needed for moderate to severe pain maximum 4 tablet(s) per day    Strain of right rotator cuff capsule, initial encounter       lisinopril 40 MG tablet    PRINIVIL/ZESTRIL    180 tablet    TAKE 2 TABLETS EVERY DAY    CKD (chronic kidney disease) stage 1, GFR 90 ml/min or greater       magnesium 500 MG Tabs      Take 500 mg by mouth daily        metFORMIN 500 MG tablet    GLUCOPHAGE    360 tablet    TAKE TWO TABLETS TWICE DAILY, WITH FOOD.    Type 2 diabetes mellitus with diabetic nephropathy, with long-term current use of insulin (H)       order for DME     1 Units    Nebulizer    Acute bronchitis with symptoms > 10 days       Potassium Gluconate 595 MG Caps      Take 595 mg by mouth daily        * predniSONE 20 MG tablet    DELTASONE    10 tablet    Take one tablet twice a day for 5 days.    Acute bronchitis with symptoms > 10 days       * predniSONE 20 MG tablet    DELTASONE    10 tablet    Take one tablet twice a day for 5 days.        Selenium 200 MCG Tabs      Take 200 mcg by mouth daily        tamsulosin 0.4 MG capsule    FLOMAX     Take 0.4 mg by mouth daily        VITAMIN B COMPLEX PO      Take 1 tablet by mouth daily.        vitamin D 1000 UNITS capsule      Take 1 capsule by mouth daily.        * Notice:  This list has 4 medication(s) that are the same as other medications prescribed for you. Read the directions carefully, and ask your doctor or other care provider to review them with you.

## 2017-10-14 NOTE — PROGRESS NOTES
SUBJECTIVE:  Sae Milligan is a 71 year old male  who is seen for  right shoulder injury that occurred  1 days ago.   Onset of injury: Following acute injury.  Mechanism of injury:  Landed shoulder after trip over a ladder. Fell a foot to the ground,   Immediate symptoms: delayed pain, was able to bear weight directly after injury, was able to use arm directly after injury, was able to use hand directly after injury.  7/10  Relieving Factors:  Nothing   Symptoms have been gradual since that time.  Prior history of related problems: no prior problems with this area in the past.    Past Medical History:   Diagnosis Date     Diabetes mellitus (H)      Squamous cell carcinoma        Social History   Substance Use Topics     Smoking status: Former Smoker     Quit date: 3/14/1990     Smokeless tobacco: Never Used     Alcohol use Yes      Comment: occasional         ROS:  CONSTITUTIONAL:NEGATIVE for fever, chills, change in weight  INTEGUMENTARY/SKIN: NEGATIVE for worrisome rashes, moles or lesions  EYES: NEGATIVE for vision changes or irritation  ENT/MOUTH: NEGATIVE for ear, mouth and throat problems  RESP:NEGATIVE for significant cough or SOB  CV: NEGATIVE for chest pain, palpitations or peripheral edema  MUSCULOSKELETAL: See above   NEURO: NEGATIVE for weakness, dizziness or paresthesias    OBJECTIVE:  Blood pressure 156/77, pulse 67, temperature 98.5  F (36.9  C), temperature source Tympanic, resp. rate 20, weight 184 lb 6.4 oz (83.6 kg).   GENERAL: healthy, alert and no distress  SKIN: no suspicious lesions or rashes  NEURO: Normal strength and tone, mentation intact and speech normal  Cardiovascular: negative, PMI normal. No lifts, heaves, or thrills. RRR. No murmurs, clicks gallops or rub  Respiratory: negative, Percussion normal. Good diaphragmatic excursion. Lungs clear  Right Shoulder:   Inspection: no swelling, bruising, discoloration, or obvious deformity or asymmetry  Tender: proximal humerus and supraspinatus    Non-tender: proximal-mid clavicle, mid-distal clavicle, AC joint, anterior capsule, supraspinatus , infraspinatus, upper trapezius muscle and rhomboids  Range of Motion  Active:limited due to pain.  Passive: limited due to pain.  Strength: rotator cuff strength limited      X-RAY INTERPRETATION    RIGHT SHOULDER THREE OR MORE VIEWS 10/14/2017 4:35 PM      HISTORY: Unspecified injury of right shoulder and upper arm, initial  encounter.          IMPRESSION: Three views right shoulder. No fracture or dislocation. No  significant soft tissue swelling. Degenerative acromioclavicular joint  changes are noted with osteophytes.    ASSESSMENT    ICD-10-CM    1. Strain of right rotator cuff capsule, initial encounter S46.011A HYDROcodone-acetaminophen (NORCO) 5-325 MG per tablet     PHYSICAL THERAPY REFERRAL   2. Shoulder injury, right, initial encounter S49.91XA XR Shoulder Right G/E 3 Views           PLAN:   Patient Instructions   Rest the affected painful area as much as possible.  Apply ice for 15-20 minutes intermittently as needed and especially after any offending activity.    Daily stretching.  As pain recedes, begin normal activities slowly as tolerated.      Avoid rapid or heavy exertion for now. Activity as tolerated     Gentle stretching two to three times daily just to keep from stiffening up.      Alternate ice and heat, 20 minutes each for 2-3 cycles.  Gel packs work best for cold. Uncooked rice is best for heat.  Fill an old, clean sock and tie or sew up.  Microwave for 30-45 seconds. Careful not to burn.    Gradually ease back into activity as it gets better.       Physical Therapy if symptoms not better with symptomatic care.       Shoulder Sprain  A sprain is a stretching or tearing of the ligaments that hold a joint together. A sprain may take up to 8 weeks to fully heal, depending on how severe it is. Moderate to severe shoulder sprains are treated with a sling or shoulder immobilizer. Minor sprains can  be treated without any special support.  Home care  The following guidelines will help you care for your injury at home:    If a sling was given to you, leave it in place for the time advised by your healthcare provider. If you aren t sure how long to wear it, ask for advice. If the sling becomes loose, adjust it so that your forearm is level with the ground. Your shoulder should feel well supported.    Put an ice pack on the injured area for 20 minutes every 1 to 2 hours the first day. You can make your own ice pack by putting ice cubes in a plastic bag. A bag of frozen peas or something similar works well too. Wrap the bag in a thin towel. Continue with ice packs 3 to 4 times a day for the next 2 to 3 days. Then use the pack as needed to ease pain and swelling.    You may use acetaminophen or ibuprofen to control pain, unless another pain medicine was prescribed. If you have chronic liver or kidney disease, talk with your healthcare provider before using these medicines. Also talk with your provider if you ve had a stomach ulcer or gastrointestinal bleeding.    Shoulder joints become stiff if left in a sling for too long. You should start range of motion exercises about 7 to 10 days after the injury. Talk with your provider to find out what type of exercises to do and how soon to start.  Follow-up care  Follow up with your healthcare provider, or as advised.  Any X-rays you had today don t show any broken bones, breaks, or fractures. Sometimes fractures don t show up on the first X-ray. Bruises and sprains can sometimes hurt as much as a fracture. These injuries can take time to heal completely. If your symptoms don t improve or they get worse, talk with your provider. You may need a repeat X-ray or other treatments.  When to seek medical advice  Call your healthcare provider right away if any of these occur:    Shoulder pain or swelling in your arm that gets worse    Fingers become cold, blue, numb, or  tingly    Large amount of bruising of the shoulder or upper arm    Fever or chills  Date Last Reviewed: 8/1/2016 2000-2017 [employer]. [address] [place] [address], [place] [address]. All rights reserved. This information is not intended as a substitute for professional medical care. Always follow your healthcare professional's instructions.          ANGEL Singh CNP

## 2017-10-17 DIAGNOSIS — E87.1 LOW SODIUM LEVELS: Primary | ICD-10-CM

## 2017-10-30 ENCOUNTER — TRANSFERRED RECORDS (OUTPATIENT)
Dept: HEALTH INFORMATION MANAGEMENT | Facility: CLINIC | Age: 72
End: 2017-10-30

## 2017-11-14 ENCOUNTER — HOSPITAL ENCOUNTER (OUTPATIENT)
Dept: PHYSICAL THERAPY | Facility: CLINIC | Age: 72
Setting detail: THERAPIES SERIES
End: 2017-11-14
Attending: NURSE PRACTITIONER
Payer: MEDICARE

## 2017-11-14 PROCEDURE — 97161 PT EVAL LOW COMPLEX 20 MIN: CPT | Mod: GP

## 2017-11-14 PROCEDURE — G8982 BODY POS GOAL STATUS: HCPCS | Mod: GP,CI

## 2017-11-14 PROCEDURE — 97110 THERAPEUTIC EXERCISES: CPT | Mod: GP

## 2017-11-14 PROCEDURE — 40000718 ZZHC STATISTIC PT DEPARTMENT ORTHO VISIT

## 2017-11-14 PROCEDURE — 97140 MANUAL THERAPY 1/> REGIONS: CPT | Mod: GP

## 2017-11-14 PROCEDURE — G8981 BODY POS CURRENT STATUS: HCPCS | Mod: GP,CI

## 2017-11-14 PROCEDURE — G8984 CARRY CURRENT STATUS: HCPCS | Mod: GP,CI

## 2017-11-14 NOTE — PROGRESS NOTES
[facility]          OUTPATIENT PHYSICAL THERAPY ORTHOPEDIC EVALUATION  PLAN OF TREATMENT FOR OUTPATIENT REHABILITATION  (COMPLETE FOR INITIAL CLAIMS ONLY)  Patient's Last Name, First Name, M.I.  YOB: 1945  Sae Milligan       Provider s Name:  [facility]   Medical Record No.  [id number]   Start of Care Date:  11/14/17   Onset Date:  10/31/17   Type:     _X__PT   ___OT   ___SLP Medical Diagnosis:  Strain of right rotator cuff capsule     PT Diagnosis:  R shoulder impingement   Visits from SOC:  1      _________________________________________________________________________________  Plan of Treatment/Functional Goals:  joint mobilization, manual therapy, neuromuscular re-education, motor coordination training, ROM, strengthening, stretching     Cryotherapy, Electrical stimulation, Hot packs, Hydrotherapy, TENS     Goals  Goal Identifier: reaching mid back  Goal Description: Following PT interventions, pt will have equal B GHJ IR AROM to have less difficutly reaching mid back  Target Date: 12/05/17    Goal Identifier: less pain with sleep on side  Goal Description: Following PT interventions, pt will test negative for Denisa's test on R shoulder for less pain with sleep on side  Target Date: 12/26/17              Therapy Frequency:  1 time/week  Predicted Duration of Therapy Intervention:  6 weeks    El Gamez, PT                 I CERTIFY THE NEED FOR THESE SERVICES FURNISHED UNDER        THIS PLAN OF TREATMENT AND WHILE UNDER MY CARE     (Physician co-signature of this document indicates review and certification of the therapy plan).                         Certification Date From:  11/14/17   Certification Date To:  12/26/17    Referring Provider:  Dionna Velez APRN CNP     Initial Assessment        See Epic Evaluation Start of Care Date: 11/14/17             Thank You,    El Gamez, PT, DPT  Doctor of Physical Therapy  [facility]  [facility]  [phone]

## 2017-11-14 NOTE — PROGRESS NOTES
" 11/14/17 0900   General Information   Type of Visit Initial OP Ortho PT Evaluation   Start of Care Date 11/14/17   Referring Physician Dionna Velez APRN CNP    Patient/Family Goals Statement less pain with sleep on side, reaching mid back   Orders Evaluate and Treat   Date of Order 10/14/17   Insurance Type Medicare;Blue Cross   Insurance Comments/Visits Authorized MC/BCBS: full MC cap, NO IONTOPHORESIS   Medical Diagnosis Strain of right rotator cuff capsule   Surgical/Medical history reviewed Yes   Precautions/Limitations no known precautions/limitations   General Information Comments PMH: Allergic rhinitis, Type 2 diabetes mellitus with diabetic nephropathy, Hyperlipidemia, DJD (degenerative joint disease), SK (seborrheic keratosis), Lentigo, Restless leg syndrome, CKD (chronic kidney disease), Other chronic allergic conjunctivitis, Squamous cell carcinoma, Family history of prostate cancer, Anxiety, Health Care Home   Body Part(s)   Body Part(s) Shoulder   Presentation and Etiology   Pertinent history of current problem (include personal factors and/or comorbidities that impact the POC) Per Agustin MCGUIRE note 10/14/17; \"right shoulder injury that occurred 1 day ago. Onset of injury: Following acute injury. Mechanism of injury: Landed shoulder after trip over a ladder. Fell a foot to the ground.\" Pt has no pain with most activities except pain on post shoulder when laying on that side. No increased pain with cough/sneeze. Neck is about the same as before the fall, less motion, some pain. Nothing with lifting/reaching. Pt not sure if PT will help, might just take time.   Impairments A. Pain;E. Decreased flexibility   Symptom Location B post shoulders, near supraspinatus   How/Where did it occur With a fall   Onset date of current episode/exacerbation 10/31/17   Chronicity New   Pain rating (0-10 point scale) Best (/10);Worst (/10)   Best (/10) 0   Worst (/10) 5   Pain quality B. Dull   Frequency of " pain/symptoms B. Intermittent   Pain/symptoms exacerbated by E. Rest  (sleep)   Pain/symptoms eased by E. Changing positions  (rolling over in bed.)   Current / Previous Interventions   X-ray results XR RIGHT SHOULDER THREE OR MORE VIEWS 10/14/2017 4:35 PM: Three views right shoulder. No fracture or dislocation. No significant soft tissue swelling. Degenerative acromioclavicular joint changes are noted with osteophytes. XR CHEST 2 VW 4/26/2017 7:12 PM: Left upper lobe calcified granuloma, unchanged from 6/9/2008. The lungs otherwise appear clear. No pleural effusions.   Current Level of Function   Patient role/employment history F. Retired   Fall Risk Screen   Fall screen completed by PT   Per patient - Fall 2 or more times in past year? Yes   Per patient - Fall with injury in past year? Yes   Functional Scales   Other Scales  SPADI: 10%   Shoulder Objective Findings   Side (if bilateral, select both right and left) Right   Right Shoulder Flexion AROM 151   Right Shoulder Abduction AROM 156 (slight scap wing with lowering)   Right Shoulder ER AROM equal B   Right Shoulder IR AROM 30, pain   Left Shoulder Flexion AROM 160   Left Shoulder Abduction AROM 160   Left Shoulder ER AROM equal B   Left Shoulder IR AROM 40   Right Shoulder Flexion Strength 4   Right Shoulder Abduction Strength 4+   Right Shoulder ER Strength 3+   Right Shoulder IR Strength 4+   Left Shoulder Flexion Strength 4   Left Shoulder Abduction Strength 4+   Left Shoulder ER Strength 3+   Left Shoulder IR Strength 4+   Shoulder Special Tests Comments + Denisa's on R, + Cervical flex and lat rot test for elevated R 1st rib   Palpation hypertonicity in R>L subclavian   Accessory Motion/Joint Mobility hypomobile L SCJ, T5 FRSR, Post R 5th rib   Planned Therapy Interventions   Planned Therapy Interventions joint mobilization;manual therapy;neuromuscular re-education;motor coordination training;ROM;strengthening;stretching   Planned Modality Interventions    Planned Modality Interventions Cryotherapy;Electrical stimulation;Hot packs;Hydrotherapy;TENS   Clinical Impression   Criteria for Skilled Therapeutic Interventions Met yes, treatment indicated   PT Diagnosis R shoulder impingement   Influenced by the following impairments R GHJ primary impingement, hypomobility, ROM, weakness   Functional limitations due to impairments less pain with sleep on side, reaching mid back   Clinical Presentation Stable/Uncomplicated   Clinical Presentation Rationale (+) good motivation, minimal sx (-) multiple comorbidities   Clinical Decision Making (Complexity) Low complexity   Therapy Frequency 1 time/week   Predicted Duration of Therapy Intervention (days/wks) 6 weeks   Risk & Benefits of therapy have been explained Yes   Patient, Family & other staff in agreement with plan of care Yes   Clinical Impression Comments Pt is a pleasant person with recent fall about 2 weeks ago with R>L shoulder pain when laying on side for sleeping. Pt presents with R GHJ primary impingement per positive Denisa's test, hypomobility per mobility testing, decreased motion per ROM and weakness per MMT. Pt is appropriate for skilled PT to address impairments and improve function in shoulders.   Education Assessment   Preferred Learning Style Listening   Barriers to Learning No barriers   ORTHO GOALS   PT Ortho Eval Goals 1;2   Ortho Goal 1   Goal Identifier reaching mid back   Goal Description Following PT interventions, pt will have equal B GHJ IR AROM to have less difficutly reaching mid back   Target Date 12/05/17   Ortho Goal 2   Goal Identifier less pain with sleep on side   Goal Description Following PT interventions, pt will test negative for Denisa's test on R shoulder for less pain with sleep on side   Target Date 12/26/17   Total Evaluation Time   Total Evaluation Time 25min   Therapy Certification   Certification date from 11/14/17   Certification date to 12/26/17   Medical Diagnosis Strain of  right rotator cuff capsule   El Gamez, PT, DPT   Doctor of Physical Therapy # [id number]  [facility]  [phone]

## 2017-11-17 DIAGNOSIS — E11.21 TYPE 2 DIABETES MELLITUS WITH DIABETIC NEPHROPATHY (H): Primary | ICD-10-CM

## 2017-11-17 DIAGNOSIS — E78.5 HYPERLIPIDEMIA LDL GOAL <100: ICD-10-CM

## 2017-11-20 DIAGNOSIS — E87.1 LOW SODIUM LEVELS: ICD-10-CM

## 2017-11-20 DIAGNOSIS — E11.21 TYPE 2 DIABETES MELLITUS WITH DIABETIC NEPHROPATHY (H): ICD-10-CM

## 2017-11-20 DIAGNOSIS — E78.5 HYPERLIPIDEMIA LDL GOAL <100: ICD-10-CM

## 2017-11-20 LAB
ANION GAP SERPL CALCULATED.3IONS-SCNC: 8 MMOL/L (ref 3–14)
BUN SERPL-MCNC: 15 MG/DL (ref 7–30)
CALCIUM SERPL-MCNC: 9.3 MG/DL (ref 8.5–10.1)
CHLORIDE SERPL-SCNC: 96 MMOL/L (ref 94–109)
CHOLEST SERPL-MCNC: 103 MG/DL
CO2 SERPL-SCNC: 28 MMOL/L (ref 20–32)
CREAT SERPL-MCNC: 1.12 MG/DL (ref 0.66–1.25)
CREAT UR-MCNC: 71 MG/DL
GFR SERPL CREATININE-BSD FRML MDRD: 64 ML/MIN/1.7M2
GLUCOSE SERPL-MCNC: 154 MG/DL (ref 70–99)
HBA1C MFR BLD: 5.8 % (ref 4.3–6)
HDLC SERPL-MCNC: 63 MG/DL
LDLC SERPL CALC-MCNC: 20 MG/DL
MICROALBUMIN UR-MCNC: 145 MG/L
MICROALBUMIN/CREAT UR: 203.94 MG/G CR (ref 0–17)
NONHDLC SERPL-MCNC: 40 MG/DL
POTASSIUM SERPL-SCNC: 4.7 MMOL/L (ref 3.4–5.3)
SODIUM SERPL-SCNC: 132 MMOL/L (ref 133–144)
TRIGL SERPL-MCNC: 102 MG/DL

## 2017-11-20 PROCEDURE — 36415 COLL VENOUS BLD VENIPUNCTURE: CPT | Performed by: FAMILY MEDICINE

## 2017-11-20 PROCEDURE — 80061 LIPID PANEL: CPT | Performed by: FAMILY MEDICINE

## 2017-11-20 PROCEDURE — 83036 HEMOGLOBIN GLYCOSYLATED A1C: CPT | Performed by: FAMILY MEDICINE

## 2017-11-20 PROCEDURE — 82043 UR ALBUMIN QUANTITATIVE: CPT | Performed by: FAMILY MEDICINE

## 2017-11-20 PROCEDURE — 80048 BASIC METABOLIC PNL TOTAL CA: CPT | Performed by: FAMILY MEDICINE

## 2017-11-21 ENCOUNTER — HOSPITAL ENCOUNTER (OUTPATIENT)
Dept: PHYSICAL THERAPY | Facility: CLINIC | Age: 72
Setting detail: THERAPIES SERIES
End: 2017-11-21
Attending: NURSE PRACTITIONER
Payer: MEDICARE

## 2017-11-21 ENCOUNTER — OFFICE VISIT (OUTPATIENT)
Dept: FAMILY MEDICINE | Facility: CLINIC | Age: 72
End: 2017-11-21
Payer: COMMERCIAL

## 2017-11-21 VITALS
HEART RATE: 71 BPM | RESPIRATION RATE: 16 BRPM | WEIGHT: 183 LBS | BODY MASS INDEX: 29.54 KG/M2 | TEMPERATURE: 98.5 F | DIASTOLIC BLOOD PRESSURE: 70 MMHG | SYSTOLIC BLOOD PRESSURE: 141 MMHG

## 2017-11-21 DIAGNOSIS — N18.1 CKD (CHRONIC KIDNEY DISEASE) STAGE 1, GFR 90 ML/MIN OR GREATER: ICD-10-CM

## 2017-11-21 DIAGNOSIS — E11.21 TYPE 2 DIABETES MELLITUS WITH DIABETIC NEPHROPATHY, WITH LONG-TERM CURRENT USE OF INSULIN (H): ICD-10-CM

## 2017-11-21 DIAGNOSIS — I10 BENIGN ESSENTIAL HYPERTENSION: ICD-10-CM

## 2017-11-21 DIAGNOSIS — E78.00 PURE HYPERCHOLESTEROLEMIA: ICD-10-CM

## 2017-11-21 DIAGNOSIS — Z79.4 TYPE 2 DIABETES MELLITUS WITH DIABETIC NEPHROPATHY, WITH LONG-TERM CURRENT USE OF INSULIN (H): ICD-10-CM

## 2017-11-21 PROCEDURE — 99213 OFFICE O/P EST LOW 20 MIN: CPT | Performed by: FAMILY MEDICINE

## 2017-11-21 PROCEDURE — 97140 MANUAL THERAPY 1/> REGIONS: CPT | Mod: GP

## 2017-11-21 PROCEDURE — 40000718 ZZHC STATISTIC PT DEPARTMENT ORTHO VISIT

## 2017-11-21 PROCEDURE — 97110 THERAPEUTIC EXERCISES: CPT | Mod: GP

## 2017-11-21 RX ORDER — AMLODIPINE BESYLATE 5 MG/1
10 TABLET ORAL DAILY
Qty: 180 TABLET | Refills: 1 | Status: SHIPPED | OUTPATIENT
Start: 2017-11-21 | End: 2018-05-22

## 2017-11-21 RX ORDER — GLIPIZIDE 5 MG/1
5 TABLET, FILM COATED, EXTENDED RELEASE ORAL DAILY
Qty: 90 TABLET | Refills: 3 | Status: SHIPPED | OUTPATIENT
Start: 2017-11-21 | End: 2018-05-22

## 2017-11-21 RX ORDER — ATORVASTATIN CALCIUM 20 MG/1
20 TABLET, FILM COATED ORAL DAILY
Qty: 90 TABLET | Refills: 3 | Status: SHIPPED | OUTPATIENT
Start: 2017-11-21 | End: 2018-05-22

## 2017-11-21 RX ORDER — LISINOPRIL 40 MG/1
40 TABLET ORAL
Qty: 180 TABLET | Refills: 0 | Status: SHIPPED | OUTPATIENT
Start: 2017-11-21 | End: 2018-03-01

## 2017-11-21 NOTE — PROGRESS NOTES
SUBJECTIVE:   aSe Milligan is a 72 year old male who presents to clinic today for the following health issues:   his bp is elevated.  Na coming down   All other labs are good.     Diabetes Follow-up      Patient is checking blood sugars: rarely.      Diabetic concerns: None     Symptoms of hypoglycemia (low blood sugar): none     Paresthesias (numbness or burning in feet) or sores: No     Date of last diabetic eye exam: Up to date     Hypertension Follow-up      Outpatient blood pressures are being checked at home.  Results are 140/150s-70s/80s.    Low Salt Diet: no added salt          Amount of exercise or physical activity: None    Problems taking medications regularly: No    Medication side effects: none  Diet: regular (no restrictions)          Problem list and histories reviewed & adjusted, as indicated.  Additional history: as documented    Patient Active Problem List   Diagnosis     Type 2 diabetes mellitus with diabetic nephropathy (H)     Hyperlipidemia LDL goal <100     Other chronic allergic conjunctivitis     DJD (degenerative joint disease)     Health Care Home     Allergic rhinitis     CKD (chronic kidney disease) stage 1, GFR 90 ml/min or greater     Squamous cell carcinoma     History of squamous cell carcinoma     SK (seborrheic keratosis)     Lentigo     Family history of prostate cancer     Restless leg syndrome     Anxiety     Past Surgical History:   Procedure Laterality Date     ARTHROPLASTY KNEE  2/10/2011    ARTHROPLASTY KNEE performed by DARLING FARRELL at [facility]     MOHS MICROGRAPHIC PROCEDURE  july 2012    Sq. cell left helix     ORTHOPEDIC SURGERY       SURGICAL HISTORY OF -   12/2003    Cervical spine fx       Social History   Substance Use Topics     Smoking status: Former Smoker     Quit date: 3/14/1990     Smokeless tobacco: Never Used     Alcohol use Yes      Comment: occasional     Family History   Problem Relation Age of Onset     Hypertension Mother      CEREBROVASCULAR DISEASE  Mother      Hypertension Father      CEREBROVASCULAR DISEASE Father      DIABETES Brother      Prostate Cancer Brother      Prostate Cancer Brother          Current Outpatient Prescriptions   Medication Sig Dispense Refill     lisinopril (PRINIVIL/ZESTRIL) 40 MG tablet TAKE 2 TABLETS EVERY  tablet 0     albuterol (PROAIR HFA/PROVENTIL HFA/VENTOLIN HFA) 108 (90 BASE) MCG/ACT Inhaler Inhale 2 puffs into the lungs every 6 hours as needed for shortness of breath / dyspnea or wheezing 1 Inhaler 0     order for DME Nebulizer 1 Units 0     albuterol (2.5 MG/3ML) 0.083% neb solution Take 1 vial (2.5 mg) by nebulization every 6 hours as needed for shortness of breath / dyspnea or wheezing 25 vial 0     amLODIPine (NORVASC) 5 MG tablet TAKE 2 TABLETS EVERY  tablet 1     Selenium 200 MCG TABS Take 200 mcg by mouth daily       magnesium 500 MG TABS Take 500 mg by mouth daily       Potassium Gluconate 595 MG CAPS Take 595 mg by mouth daily       blood glucose monitoring (NO BRAND SPECIFIED) test strip 1 strip by In Vitro route daily Test 1x daily. Accu-Chek Marlene test strips 2 Box 3     tamsulosin (FLOMAX) 0.4 MG capsule Take 0.4 mg by mouth daily       glipiZIDE (GLIPIZIDE XL) 5 MG 24 hr tablet Take 1 tablet (5 mg) by mouth daily 90 tablet 3     atorvastatin (LIPITOR) 20 MG tablet Take 1 tablet (20 mg) by mouth daily 90 tablet 3     metFORMIN (GLUCOPHAGE) 500 MG tablet TAKE TWO TABLETS TWICE DAILY, WITH FOOD. 360 tablet 3     aspirin 81 MG tablet Take 1 tablet by mouth daily.       Cholecalciferol (VITAMIN D) 1000 UNIT capsule Take 1 capsule by mouth daily.       B Complex Vitamins (VITAMIN B COMPLEX PO) Take 1 tablet by mouth daily.       CALCIUM CARBONATE-VITAMIN D 500-200 MG-UNIT OR TABS Take one tablet twice daily with food       EPINEPHrine (EPIPEN) 0.3 MG/0.3ML injection Inject 0.3 mLs (0.3 mg) into the muscle as needed for anaphylaxis (Patient not taking: Reported on 9/19/2017) 0.6 mL 2     ORDER FOR  ALLERGEN IMMUNOTHERAPY Mix #3  Tree , Weeds  Mt, White  GLY1:20 w/v, HS  0.3ml  Birch Mix GLY1:20 w/v, HS  0.3ml  [place] GLY1:20 w/v, HS  0.3ml  Elm, American GLY1:20 w/v, HS  0.3ml  [place], Black GLY1:20 w/v 0.3ml  [place]ock[place] GLY 1:20 w/v, HS 0.3ml  Lamb's Quarters GLY 1:20 w/v, ALK 0.3ml  Marshelder-Povertyweed GLY 1:20 w/v, HS 0.3ml  Nettle GLY 1:20 w/v, HS 0.3ml  Ragweed, Short GLY 1:20 w/v HS 0.3ml  Russian Thistle GLY 1:20 w/v, HS 0.3ml  Diluent: HSAqs to 5ml (Patient not taking: Reported on 10/14/2017) 5 mL PRN     Allergies   Allergen Reactions     Penicillins Hives, Itching and Swelling     Of lips     Recent Labs   Lab Test  11/20/17   0927  09/05/17   0905   04/03/17   0919  09/29/16   0814   10/06/15   0743   06/11/14   0740   11/06/13   0832   04/25/13   0937   01/10/12   0925   A1C  5.8   --    --   5.8  5.4   < >  6.1*   < >  5.8   --   6.1*   --   6.4*   < >  6.1*   LDL  20   --    --    --   30   --   50   --   71   --   91   < >  112   < >  92   HDL  63   --    --    --   68   --   53   --   50   --   47   < >  55   < >  50   TRIG  102   --    --    --   57   --   67   --   47   --   80   < >  99   < >  75   ALT   --    --    --    --    --    --    --    --    --    --   41   --   39   --   31   CR  1.12  1.09   < >   --    --    < >  0.88   < >  0.92   < >  0.89   --   0.89   < >  0.86   GFRESTIMATED  64  67   < >   --    --    < >  85   < >  82   < >  85   --   85   < >  89   GFRESTBLACK  78  81   < >   --    --    < >  >90   GFR Calc     < >  >90   < >  >90   --   >90   < >  >90   POTASSIUM  4.7  4.7   < >   --    --    < >  5.0   < >  5.5*   < >  5.3   --   4.9   < >  4.9   TSH   --    --    --    --   2.82   --    --    --   1.61   --    --    --    --    --   1.41    < > = values in this interval not displayed.      BP Readings from Last 3 Encounters:   11/21/17 141/70   10/14/17 156/77   09/19/17 156/71    Wt Readings from Last 3 Encounters:   11/21/17 183 lb (83 kg)    10/14/17 184 lb 6.4 oz (83.6 kg)   09/19/17 187 lb 3.2 oz (84.9 kg)                          Reviewed and updated as needed this visit by clinical staffTobacco  Allergies  Med Hx  Surg Hx  Fam Hx  Soc Hx      Reviewed and updated as needed this visit by Provider         ROS:  C: NEGATIVE for fever, chills, change in weight  E/M: NEGATIVE for ear, mouth and throat problems  R: NEGATIVE for significant cough or SOB  CV: NEGATIVE for chest pain, palpitations or peripheral edema    OBJECTIVE:     /70  Pulse 71  Temp 98.5  F (36.9  C) (Tympanic)  Resp 16  Wt 183 lb (83 kg)  BMI 29.54 kg/m2  Body mass index is 29.54 kg/(m^2).  GENERAL: healthy, alert and no distress  NECK: no adenopathy, no asymmetry, masses, or scars and thyroid normal to palpation  RESP: lungs clear to auscultation - no rales, rhonchi or wheezes  CV: regular rate and rhythm, normal S1 S2, no S3 or S4, no murmur, click or rub, no peripheral edema and peripheral pulses strong  ABDOMEN: soft, nontender, no hepatosplenomegaly, no masses and bowel sounds normal  MS: no gross musculoskeletal defects noted, no edema        ASSESSMENT/PLAN:             1. CKD (chronic kidney disease) stage 1, GFR 90 ml/min or greater  Stable   - lisinopril (PRINIVIL/ZESTRIL) 40 MG tablet;   Dispense: 180 tablet; Refill: 0    2. Benign essential hypertension    - amLODIPine (NORVASC) 5 MG tablet;   Dispense: 180 tablet; Refill: 1    3. Type 2 diabetes mellitus with diabetic nephropathy, with long-term current use of insulin (H)    - glipiZIDE (GLIPIZIDE XL) 5 MG 24 hr tablet; Take 1 tablet (5 mg) by mouth daily  Dispense: 90 tablet; Refill: 3  - metFORMIN (GLUCOPHAGE) 500 MG tablet; TAKE TWO TABLETS TWICE DAILY, WITH FOOD.  Dispense: 360 tablet; Refill: 3    4. Pure hypercholesterolemia    - atorvastatin (LIPITOR) 20 MG tablet; Take 1 tablet (20 mg) by mouth daily  Dispense: 90 tablet; Refill: 3    5. Type 2 diabetes, HbA1C goal < 8% (H)    - atorvastatin  (LIPITOR) 20 MG tablet; Take 1 tablet (20 mg) by mouth daily  Dispense: 90 tablet; Refill: 3    ASSESSMENT/PLAN:      ICD-10-CM    1. CKD (chronic kidney disease) stage 1, GFR 90 ml/min or greater N18.1 lisinopril (PRINIVIL/ZESTRIL) 40 MG tablet   2. Benign essential hypertension I10 amLODIPine (NORVASC) 5 MG tablet   3. Type 2 diabetes mellitus with diabetic nephropathy, with long-term current use of insulin (H) E11.21 glipiZIDE (GLIPIZIDE XL) 5 MG 24 hr tablet    Z79.4 metFORMIN (GLUCOPHAGE) 500 MG tablet   4. Pure hypercholesterolemia E78.00 atorvastatin (LIPITOR) 20 MG tablet   5. Type 2 diabetes, HbA1C goal < 8% (H) E11.9 atorvastatin (LIPITOR) 20 MG tablet       Patient Instructions   1. Lets stay with the same medications for now.  Stop the salt pills.  Take the lisinopril in the am.       2. Watch BP over two weeks and it should be around 130/85.  If not come back for change in meds.     3. We might need to go back to a diuretic.     4 bp is am and afternoon.           Kelvin Keenan MD  [facility]

## 2017-11-21 NOTE — PATIENT INSTRUCTIONS
1. Lets stay with the same medications for now.  Stop the salt pills.  Take the lisinopril in the am.       2. Watch BP over two weeks and it should be around 130/85.  If not come back for change in meds.     3. We might need to go back to a diuretic.     4 bp is am and afternoon.

## 2017-11-21 NOTE — NURSING NOTE
"Chief Complaint   Patient presents with     Diabetes     Hypertension     elevated lately       Initial /70  Pulse 71  Temp 98.5  F (36.9  C) (Tympanic)  Resp 16  Wt 183 lb (83 kg)  BMI 29.54 kg/m2 Estimated body mass index is 29.54 kg/(m^2) as calculated from the following:    Height as of 9/19/17: 5' 6\" (1.676 m).    Weight as of this encounter: 183 lb (83 kg).  Medication Reconciliation: complete    Health Maintenance that is potentially due pending provider review:  Colonoscopy/FIT and ACT    Pt will schedule  appt.    Is there anyone who you would like to be able to receive your results? No  If yes have patient fill out LORNE    "

## 2017-11-21 NOTE — MR AVS SNAPSHOT
After Visit Summary   11/21/2017    Sae Milligan    MRN: [id number]           Patient Information     Date Of Birth          1945        Visit Information        Provider Department      11/21/2017 10:00 AM Kelvin Keenan MD [facility]        Today's Diagnoses     CKD (chronic kidney disease) stage 1, GFR 90 ml/min or greater        Benign essential hypertension        Type 2 diabetes mellitus with diabetic nephropathy, with long-term current use of insulin (H)        Pure hypercholesterolemia        Type 2 diabetes, HbA1C goal < 8% (H)          Care Instructions    1. Lets stay with the same medications for now.  Stop the salt pills.  Take the lisinopril in the am.       2. Watch BP over two weeks and it should be around 130/85.  If not come back for change in meds.     3. We might need to go back to a diuretic.     4 bp is am and afternoon.           Follow-ups after your visit        Your next 10 appointments already scheduled     Nov 29, 2017  9:30 AM CST   Ortho Treatment with El Gamez PT   [facility] ([facility])    [address] [place] [id number] [phone]              Who to contact     If you have questions or need follow up information about today's clinic visit or your schedule please contact [facility] directly at [phone].  Normal or non-critical lab and imaging results will be communicated to you by MyChart, letter or phone within 4 business days after the clinic has received the results. If you do not hear from us within 7 days, please contact the clinic through MyChart or phone. If you have a critical or abnormal lab result, we will notify you by phone as soon as possible.  Submit refill requests through [employer] or call your pharmacy and they will forward the refill request to us. Please allow 3 business days for your refill to be completed.          Additional  Information About Your Visit        [employer] Information     [employer] gives you secure access to your electronic health record. If you see a primary care provider, you can also send messages to your care team and make appointments. If you have questions, please call your primary care clinic.  If you do not have a primary care provider, please call [phone] and they will assist you.        Care EveryWhere ID     This is your Care EveryWhere ID. This could be used by other organizations to access your [place] medical records  [id number]        Your Vitals Were     Pulse Temperature Respirations BMI (Body Mass Index)          71 98.5  F (36.9  C) (Tympanic) 16 29.54 kg/m2         Blood Pressure from Last 3 Encounters:   11/21/17 141/70   10/14/17 156/77   09/19/17 156/71    Weight from Last 3 Encounters:   11/21/17 183 lb (83 kg)   10/14/17 184 lb 6.4 oz (83.6 kg)   09/19/17 187 lb 3.2 oz (84.9 kg)              Today, you had the following     No orders found for display       Primary Care Provider Office Phone # Fax #    Kelvin Keenan [phone]       [address] [place] [address]        Equal Access to Services     KACEY GARLAND AH: Hadii aad ku hadasho Soomaali, waaxda luqadaha, qaybta kaalmada adeegyada, waxay vaniin haypiyushn junie latham lapriti ah. So [facility] [phone].    ATENCIÓN: Si habla español, tiene a wagn disposición servicios gratuitos de asistencia lingüística. Llame al [phone].    We comply with applicable federal civil rights laws and [place] laws. We do not discriminate on the basis of race, color, national origin, age, disability, sex, sexual orientation, or gender identity.            Thank you!     Thank you for choosing [facility]  for your care. Our goal is always to provide you with excellent care. Hearing back from our patients is one way we can continue to improve our services. Please take a few minutes to complete the written  survey that you may receive in the mail after your visit with us. Thank you!             Your Updated Medication List - Protect others around you: Learn how to safely use, store and throw away your medicines at www.disposemymeds.org.          This list is accurate as of: 11/21/17 10:44 AM.  Always use your most recent med list.                   Brand Name Dispense Instructions for use Diagnosis    * albuterol (2.5 MG/3ML) 0.083% neb solution     25 vial    Take 1 vial (2.5 mg) by nebulization every 6 hours as needed for shortness of breath / dyspnea or wheezing    Acute bronchitis with symptoms > 10 days       * albuterol 108 (90 BASE) MCG/ACT Inhaler    PROAIR HFA/PROVENTIL HFA/VENTOLIN HFA    1 Inhaler    Inhale 2 puffs into the lungs every 6 hours as needed for shortness of breath / dyspnea or wheezing    Acute bronchitis, unspecified organism       ALLERGEN IMMUNOTHERAPY PRESCRIPTION     5 mL    Mix #3  Tree , [place], White  GLY1:20 w/v, HS  0.3ml Birch Mix GLY1:20 w/v, HS  0.3ml [place] GLY1:20 w/v, HS  0.3ml Elm, American GLY1:20 w/v, HS  0.3ml [place], Black GLY1:20 w/v 0.3ml [place] GLY 1:20 w/v, HS 0.3ml Lamb's Quarters GLY 1:20 w/v, ALK 0.3ml [place]er-Povertyweed GLY 1:20 w/v, HS 0.3ml Nettle GLY 1:20 w/v, HS 0.3ml Ragweed, Short GLY 1:20 w/v HS 0.3ml Russian Thistle GLY 1:20 w/v, HS 0.3ml Diluent: HSAqs to 5ml    Chronic rhinitis       amLODIPine 5 MG tablet    NORVASC    180 tablet    TAKE 2 TABLETS EVERY DAY    Benign essential hypertension       aspirin 81 MG tablet      Take 1 tablet by mouth daily.        atorvastatin 20 MG tablet    LIPITOR    90 tablet    Take 1 tablet (20 mg) by mouth daily    Pure hypercholesterolemia, Type 2 diabetes, HbA1C goal < 8% (H)       blood glucose monitoring test strip    no brand specified    2 Box    1 strip by In Vitro route daily Test 1x daily. Accu-Chek Marlene test strips    Type 2 diabetes mellitus with diabetic nephropathy, with long-term current use of  insulin (H)       calcium carb 1250 mg (500 mg [place])/vitamin D 200 unit 500-200 MG-UNIT per tablet    OSCAL with D     Take one tablet twice daily with food        EPINEPHrine 0.3 MG/0.3ML injection 2-pack    EPIPEN/ADRENACLICK/or ANY BX GENERIC EQUIV    0.6 mL    Inject 0.3 mLs (0.3 mg) into the muscle as needed for anaphylaxis    Need for desensitization to allergens       glipiZIDE 5 MG 24 hr tablet    glipiZIDE XL    90 tablet    Take 1 tablet (5 mg) by mouth daily    Type 2 diabetes mellitus with diabetic nephropathy, with long-term current use of insulin (H)       lisinopril 40 MG tablet    PRINIVIL/ZESTRIL    180 tablet    TAKE 2 TABLETS EVERY DAY    CKD (chronic kidney disease) stage 1, GFR 90 ml/min or greater       magnesium 500 MG Tabs      Take 500 mg by mouth daily        metFORMIN 500 MG tablet    GLUCOPHAGE    360 tablet    TAKE TWO TABLETS TWICE DAILY, WITH FOOD.    Type 2 diabetes mellitus with diabetic nephropathy, with long-term current use of insulin (H)       order for DME     1 Units    Nebulizer    Acute bronchitis with symptoms > 10 days       Potassium Gluconate 595 MG Caps      Take 595 mg by mouth daily        Selenium 200 MCG Tabs      Take 200 mcg by mouth daily        tamsulosin 0.4 MG capsule    FLOMAX     Take 0.4 mg by mouth daily        VITAMIN B COMPLEX PO      Take 1 tablet by mouth daily.        vitamin D 1000 UNITS capsule      Take 1 capsule by mouth daily.        * Notice:  This list has 2 medication(s) that are the same as other medications prescribed for you. Read the directions carefully, and ask your doctor or other care provider to review them with you.

## 2017-11-22 ASSESSMENT — ASTHMA QUESTIONNAIRES: ACT_TOTALSCORE: 25

## 2017-11-29 ENCOUNTER — HOSPITAL ENCOUNTER (OUTPATIENT)
Dept: PHYSICAL THERAPY | Facility: CLINIC | Age: 72
Setting detail: THERAPIES SERIES
End: 2017-11-29
Attending: NURSE PRACTITIONER
Payer: MEDICARE

## 2017-11-29 ENCOUNTER — ALLIED HEALTH/NURSE VISIT (OUTPATIENT)
Dept: FAMILY MEDICINE | Facility: CLINIC | Age: 72
End: 2017-11-29
Payer: COMMERCIAL

## 2017-11-29 VITALS — SYSTOLIC BLOOD PRESSURE: 132 MMHG | DIASTOLIC BLOOD PRESSURE: 62 MMHG | HEART RATE: 70 BPM

## 2017-11-29 DIAGNOSIS — Z01.30 BLOOD PRESSURE CHECK: Primary | ICD-10-CM

## 2017-11-29 PROCEDURE — 99207 ZZC NO CHARGE NURSE ONLY: CPT

## 2017-11-29 PROCEDURE — 40000718 ZZHC STATISTIC PT DEPARTMENT ORTHO VISIT

## 2017-11-29 PROCEDURE — 97140 MANUAL THERAPY 1/> REGIONS: CPT | Mod: GP

## 2017-11-29 NOTE — PROGRESS NOTES
Sae Milligan is a 72 year old male who comes in today for a Blood Pressure check because of ongoing blood pressure monitoring.        Vitals as recorded, a regular cuff was used.    Patient is taking medication as prescribed  Patient is tolerating medications well.  Patient is not monitoring Blood Pressure at home.      Current complaints: none    Disposition: patient to continue with the same medication  Chioma Del Toro RN

## 2017-11-29 NOTE — MR AVS SNAPSHOT
After Visit Summary   11/29/2017    Sae Milligan    MRN: [id number]           Patient Information     Date Of Birth          1945        Visit Information        Provider Department      11/29/2017 10:00 AM FL NB RN [facility]        Today's Diagnoses     Blood pressure check    -  1       Follow-ups after your visit        Your next 10 appointments already scheduled     Dec 05, 2017  9:40 AM CST   Office Visit with Kelvin Keenan MD   [facility] ([facility])    [address] [place] [address] [phone]           Bring a current list of meds and any records pertaining to this visit. For Physicals, please bring immunization records and any forms needing to be filled out. Please arrive 10 minutes early to complete paperwork.            Dec 06, 2017  9:30 AM CST   Ortho Treatment with El Gamez PT   [facility] ([facility])    [address] [place] [address] [phone]              Who to contact     If you have questions or need follow up information about today's clinic visit or your schedule please contact [facility] directly at [phone].  Normal or non-critical lab and imaging results will be communicated to you by MyChart, letter or phone within 4 business days after the clinic has received the results. If you do not hear from us within 7 days, please contact the clinic through Elevate Digitalhart or phone. If you have a critical or abnormal lab result, we will notify you by phone as soon as possible.  Submit refill requests through JibJab or call your pharmacy and they will forward the refill request to us. Please allow 3 business days for your refill to be completed.          Additional Information About Your Visit        MyChart Information     JibJab gives you secure access to your electronic health record. If you see a primary  care provider, you can also send messages to your care team and make appointments. If you have questions, please call your primary care clinic.  If you do not have a primary care provider, please call [phone] and they will assist you.        Care EveryWhere ID     This is your Care EveryWhere ID. This could be used by other organizations to access your [facility] medical records  [id number]        Your Vitals Were     Pulse                   70            Blood Pressure from Last 3 Encounters:   11/29/17 132/62   11/21/17 141/70   10/14/17 156/77    Weight from Last 3 Encounters:   11/21/17 183 lb (83 kg)   10/14/17 184 lb 6.4 oz (83.6 kg)   09/19/17 187 lb 3.2 oz (84.9 kg)              Today, you had the following     No orders found for display       Primary Care Provider Office Phone # Fax #    Kelvin Keenan [phone]       [address] [place] [address]        Equal Access to Services     ASHLEY GARLAND : Hadii aad ku hadasho Soomaali, waaxda luqadaha, qaybta kaalmada adeegyada, waxay idiin haypiyushn junie rbadley . So [facility] [phone].    ATENCIÓN: Si habla español, tiene a wang disposición servicios gratuitos de asistencia lingüística. Ll[facility] [phone].    We comply with applicable federal civil rights laws and [place] laws. We do not discriminate on the basis of race, color, national origin, age, disability, sex, sexual orientation, or gender identity.            Thank you!     Thank you for choosing [facility]  for your care. Our goal is always to provide you with excellent care. Hearing back from our patients is one way we can continue to improve our services. Please take a few minutes to complete the written survey that you may receive in the mail after your visit with us. Thank you!             Your Updated Medication List - Protect others around you: Learn how to safely use, store and throw away your medicines at  www.disposemymeds.org.          This list is accurate as of: 11/29/17  4:27 PM.  Always use your most recent med list.                   Brand Name Dispense Instructions for use Diagnosis    * albuterol (2.5 MG/3ML) 0.083% neb solution     25 vial    Take 1 vial (2.5 mg) by nebulization every 6 hours as needed for shortness of breath / dyspnea or wheezing    Acute bronchitis with symptoms > 10 days       * albuterol 108 (90 BASE) MCG/ACT Inhaler    PROAIR HFA/PROVENTIL HFA/VENTOLIN HFA    1 Inhaler    Inhale 2 puffs into the lungs every 6 hours as needed for shortness of breath / dyspnea or wheezing    Acute bronchitis, unspecified organism       ALLERGEN IMMUNOTHERAPY PRESCRIPTION     5 mL    Mix #3  Tree , [place], [place]  GLY1:20 w/v, HS  0.3ml Birch Mix GLY1:20 w/v, HS  0.3ml [place] GLY1:20 w/v, HS  0.3ml Elm, American GLY1:20 w/v, HS  0.3ml [place], Black GLY1:20 w/v 0.3ml [place] GLY 1:20 w/v, HS 0.3ml [place] GLY 1:20 w/v, ALK 0.3ml Mars[place]er-Povertyweed GLY 1:20 w/v, HS 0.3ml Nettle GLY 1:20 w/v, HS 0.3ml Ragweed, Short GLY 1:20 w/v HS 0.3ml Russian Thistle GLY 1:20 w/v, HS 0.3ml Diluent: HSAqs to 5ml    Chronic rhinitis       amLODIPine 5 MG tablet    NORVASC    180 tablet    Take 2 tablets (10 mg) by mouth daily    Benign essential hypertension       aspirin 81 MG tablet      Take 1 tablet by mouth daily.        atorvastatin 20 MG tablet    LIPITOR    90 tablet    Take 1 tablet (20 mg) by mouth daily    Pure hypercholesterolemia       blood glucose monitoring test strip    no brand specified    2 Box    1 strip by In Vitro route daily Test 1x daily. Accu-Chek Marlene test strips    Type 2 diabetes mellitus with diabetic nephropathy, with long-term current use of insulin (H)       calcium carb 1250 mg (500 mg [place])/vitamin D 200 unit 500-200 MG-UNIT per tablet    OSCAL with D     Take one tablet twice daily with food        EPINEPHrine 0.3 MG/0.3ML injection 2-pack    EPIPEN/ADRENACLICK/or ANY  BX GENERIC EQUIV    0.6 mL    Inject 0.3 mLs (0.3 mg) into the muscle as needed for anaphylaxis    Need for desensitization to allergens       glipiZIDE 5 MG 24 hr tablet    glipiZIDE XL    90 tablet    Take 1 tablet (5 mg) by mouth daily    Type 2 diabetes mellitus with diabetic nephropathy, with long-term current use of insulin (H)       lisinopril 40 MG tablet    PRINIVIL/ZESTRIL    180 tablet    Take 1 tablet (40 mg) by mouth 2 times daily    CKD (chronic kidney disease) stage 1, GFR 90 ml/min or greater       magnesium 500 MG Tabs      Take 500 mg by mouth daily        metFORMIN 500 MG tablet    GLUCOPHAGE    360 tablet    TAKE TWO TABLETS TWICE DAILY, WITH FOOD.    Type 2 diabetes mellitus with diabetic nephropathy, with long-term current use of insulin (H)       order for DME     1 Units    Nebulizer    Acute bronchitis with symptoms > 10 days       Potassium Gluconate 595 MG Caps      Take 595 mg by mouth daily        Selenium 200 MCG Tabs      Take 200 mcg by mouth daily        tamsulosin 0.4 MG capsule    FLOMAX     Take 0.4 mg by mouth daily        VITAMIN B COMPLEX PO      Take 1 tablet by mouth daily.        * Notice:  This list has 2 medication(s) that are the same as other medications prescribed for you. Read the directions carefully, and ask your doctor or other care provider to review them with you.

## 2017-12-05 ENCOUNTER — OFFICE VISIT (OUTPATIENT)
Dept: FAMILY MEDICINE | Facility: CLINIC | Age: 72
End: 2017-12-05
Payer: COMMERCIAL

## 2017-12-05 VITALS
WEIGHT: 181.8 LBS | SYSTOLIC BLOOD PRESSURE: 147 MMHG | DIASTOLIC BLOOD PRESSURE: 75 MMHG | TEMPERATURE: 97.6 F | BODY MASS INDEX: 29.34 KG/M2 | HEART RATE: 67 BPM

## 2017-12-05 DIAGNOSIS — Z12.11 SPECIAL SCREENING FOR MALIGNANT NEOPLASMS, COLON: ICD-10-CM

## 2017-12-05 DIAGNOSIS — I10 ESSENTIAL HYPERTENSION: Primary | ICD-10-CM

## 2017-12-05 PROCEDURE — 99214 OFFICE O/P EST MOD 30 MIN: CPT | Performed by: FAMILY MEDICINE

## 2017-12-05 RX ORDER — METOPROLOL TARTRATE 25 MG/1
TABLET, FILM COATED ORAL
Qty: 60 TABLET | Refills: 3 | Status: SHIPPED | OUTPATIENT
Start: 2017-12-05 | End: 2018-01-16

## 2017-12-05 NOTE — PATIENT INSTRUCTIONS
1.  We still need to get some additonal improvement on BP    2. Will add new bp pill      3. Get colon done    4.get labs done on 12/21 and I will call results.

## 2017-12-05 NOTE — PROGRESS NOTES
SUBJECTIVE:   Sae Milligan is a 72 year old male who presents to clinic today for the following health issues:    Hypertension Follow-up  His pressure is improving.  Has positive FIT.        Outpatient blood pressures are being checked at home.  Results are 140's-170's/50's-80's.    Low Salt Diet: no added salt        Amount of exercise or physical activity: None    Problems taking medications regularly: No    Medication side effects: none    Diet: regular (no restrictions)            Problem list and histories reviewed & adjusted, as indicated.  Additional history: as documented    Patient Active Problem List   Diagnosis     Type 2 diabetes mellitus with diabetic nephropathy (H)     Hyperlipidemia LDL goal <100     Other chronic allergic conjunctivitis     DJD (degenerative joint disease)     Health Care Home     Allergic rhinitis     CKD (chronic kidney disease) stage 1, GFR 90 ml/min or greater     Squamous cell carcinoma     History of squamous cell carcinoma     SK (seborrheic keratosis)     Lentigo     Family history of prostate cancer     Restless leg syndrome     Anxiety     Past Surgical History:   Procedure Laterality Date     ARTHROPLASTY KNEE  2/10/2011    ARTHROPLASTY KNEE performed by DARLING FARRELL at [facility]     MOHS MICROGRAPHIC PROCEDURE  july 2012    Sq. cell left helix     ORTHOPEDIC SURGERY       SURGICAL HISTORY OF -   12/2003    Cervical spine fx       Social History   Substance Use Topics     Smoking status: Former Smoker     Quit date: 3/14/1990     Smokeless tobacco: Never Used     Alcohol use Yes      Comment: occasional     Family History   Problem Relation Age of Onset     Hypertension Mother      CEREBROVASCULAR DISEASE Mother      Hypertension Father      CEREBROVASCULAR DISEASE Father      DIABETES Brother      Prostate Cancer Brother      Prostate Cancer Brother          Current Outpatient Prescriptions   Medication Sig Dispense Refill     metoprolol (LOPRESSOR) 25 MG tablet Take  one half pill two times daily 60 tablet 3     lisinopril (PRINIVIL/ZESTRIL) 40 MG tablet Take 1 tablet (40 mg) by mouth 2 times daily 180 tablet 0     amLODIPine (NORVASC) 5 MG tablet Take 2 tablets (10 mg) by mouth daily 180 tablet 1     glipiZIDE (GLIPIZIDE XL) 5 MG 24 hr tablet Take 1 tablet (5 mg) by mouth daily 90 tablet 3     atorvastatin (LIPITOR) 20 MG tablet Take 1 tablet (20 mg) by mouth daily 90 tablet 3     metFORMIN (GLUCOPHAGE) 500 MG tablet TAKE TWO TABLETS TWICE DAILY, WITH FOOD. 360 tablet 3     albuterol (PROAIR HFA/PROVENTIL HFA/VENTOLIN HFA) 108 (90 BASE) MCG/ACT Inhaler Inhale 2 puffs into the lungs every 6 hours as needed for shortness of breath / dyspnea or wheezing 1 Inhaler 0     order for DME Nebulizer 1 Units 0     albuterol (2.5 MG/3ML) 0.083% neb solution Take 1 vial (2.5 mg) by nebulization every 6 hours as needed for shortness of breath / dyspnea or wheezing 25 vial 0     Selenium 200 MCG TABS Take 200 mcg by mouth daily       magnesium 500 MG TABS Take 500 mg by mouth daily       Potassium Gluconate 595 MG CAPS Take 595 mg by mouth daily       blood glucose monitoring (NO BRAND SPECIFIED) test strip 1 strip by In Vitro route daily Test 1x daily. Accu-Chek Marlene test strips 2 Box 3     tamsulosin (FLOMAX) 0.4 MG capsule Take 0.4 mg by mouth daily       ORDER FOR ALLERGEN IMMUNOTHERAPY Mix #3  Tree , [place]  [place]t, White  GLY1:20 w/v, HS  0.3ml  Birch Mix GLY1:20 w/v, HS  0.3ml  [place] GLY1:20 w/v, HS  0.3ml  Elm, American GLY1:20 w/v, HS  0.3ml  [place], Black GLY1:20 w/v 0.3ml  Cocklebur GLY 1:20 w/v, HS 0.3ml  Lamb's Quarters GLY 1:20 w/v, ALK 0.3ml  Marshelder-Povertyweed GLY 1:20 w/v, HS 0.3ml  Nettle GLY 1:20 w/v, HS 0.3ml  Ragweed, Short GLY 1:20 w/v HS 0.3ml  Russian Thistle GLY 1:20 w/v, HS 0.3ml  Diluent: HSAqs to 5ml 5 mL PRN     aspirin 81 MG tablet Take 1 tablet by mouth daily.       B Complex Vitamins (VITAMIN B COMPLEX PO) Take 1 tablet by mouth daily.       CALCIUM  CARBONATE-VITAMIN D 500-200 MG-UNIT OR TABS Take one tablet twice daily with food       Allergies   Allergen Reactions     Penicillins Hives, Itching and Swelling     Of lips     BP Readings from Last 3 Encounters:   12/05/17 147/75   11/29/17 132/62   11/21/17 141/70    Wt Readings from Last 3 Encounters:   12/05/17 181 lb 12.8 oz (82.5 kg)   11/21/17 183 lb (83 kg)   10/14/17 184 lb 6.4 oz (83.6 kg)                        Reviewed and updated as needed this visit by clinical staffTobacco  Allergies  Meds  Med Hx  Surg Hx  Fam Hx  Soc Hx      Reviewed and updated as needed this visit by Provider         ROS:  C: NEGATIVE for fever, chills, change in weight  E/M: NEGATIVE for ear, mouth and throat problems  R: NEGATIVE for significant cough or SOB  CV: NEGATIVE for chest pain, palpitations or peripheral edema    OBJECTIVE:     /75 (BP Location: Right arm, Patient Position: Chair, Cuff Size: Adult Regular)  Pulse 67  Temp 97.6  F (36.4  C) (Tympanic)  Wt 181 lb 12.8 oz (82.5 kg)  BMI 29.34 kg/m2  Body mass index is 29.34 kg/(m^2).  GENERAL: healthy, alert and no distress  NECK: no adenopathy, no asymmetry, masses, or scars and thyroid normal to palpation  RESP: lungs clear to auscultation - no rales, rhonchi or wheezes  CV: regular rate and rhythm, normal S1 S2, no S3 or S4, no murmur, click or rub, no peripheral edema and peripheral pulses strong  ABDOMEN: soft, nontender, no hepatosplenomegaly, no masses and bowel sounds normal  MS: no gross musculoskeletal defects noted, no edema        ASSESSMENT/PLAN:             1. Essential hypertension  Improving.  Will add:   - Basic metabolic panel; Future  - metoprolol (LOPRESSOR) 25 MG tablet; Take one half pill two times daily  Dispense: 60 tablet; Refill: 3    2. Special screening for malignant neoplasms, colon  Need the following   - GASTROENTEROLOGY ADULT REF PROCEDURE ONLY    ASSESSMENT/PLAN:      ICD-10-CM    1. Essential hypertension I10 Basic  metabolic panel     metoprolol (LOPRESSOR) 25 MG tablet   2. Special screening for malignant neoplasms, colon Z12.11 GASTROENTEROLOGY ADULT REF PROCEDURE ONLY       Patient Instructions   1.  We still need to get some additonal improvement on BP    2. Will add new bp pill      3. Get colon done    4.get labs done on 12/21 and I will call results.           Kelvin Keenan MD  [facility]

## 2017-12-05 NOTE — MR AVS SNAPSHOT
After Visit Summary   12/5/2017    Sae Milligan    MRN: [id number]           Patient Information     Date Of Birth          1945        Visit Information        Provider Department      12/5/2017 9:40 AM Kelvin Keenan MD [facility]        Today's Diagnoses     Essential hypertension    -  1      Care Instructions    1.  We still need to get some additonal improvement on BP    2. Will add new bp pill      3. Get colon done    4.get labs done on 12/21 and I will call results.           Follow-ups after your visit        Your next 10 appointments already scheduled     Dec 06, 2017  9:30 AM CST   Ortho Treatment with El Gamez PT   [facility] ([facility])    [address] [place] [address] [phone]              Future tests that were ordered for you today     Open Future Orders        Priority Expected Expires Ordered    Basic metabolic panel Routine 12/21/2017 12/28/2017 12/5/2017            Who to contact     If you have questions or need follow up information about today's clinic visit or your schedule please contact [facility] directly at [phone].  Normal or non-critical lab and imaging results will be communicated to you by Imsyshart, letter or phone within 4 business days after the clinic has received the results. If you do not hear from us within 7 days, please contact the clinic through Imsyshart or phone. If you have a critical or abnormal lab result, we will notify you by phone as soon as possible.  Submit refill requests through [employer]omim[employer] or call your pharmacy and they will forward the refill request to us. Please allow 3 business days for your refill to be completed.          Additional Information About Your Visit        Imsyshart Information     [employer] gives you secure access to your electronic health record. If you see a primary care provider, you can also send messages to  your care team and make appointments. If you have questions, please call your primary care clinic.  If you do not have a primary care provider, please call [phone] and they will assist you.        Care EveryWhere ID     This is your Care EveryWhere ID. This could be used by other organizations to access your [place] medical records  [id number]        Your Vitals Were     Pulse Temperature BMI (Body Mass Index)             67 97.6  F (36.4  C) (Tympanic) 29.34 kg/m2          Blood Pressure from Last 3 Encounters:   12/05/17 147/75   11/29/17 132/62   11/21/17 141/70    Weight from Last 3 Encounters:   12/05/17 181 lb 12.8 oz (82.5 kg)   11/21/17 183 lb (83 kg)   10/14/17 184 lb 6.4 oz (83.6 kg)                 Today's Medication Changes          These changes are accurate as of: 12/5/17 10:15 AM.  If you have any questions, ask your nurse or doctor.               Start taking these medicines.        Dose/Directions    metoprolol 25 MG tablet   Commonly known as:  LOPRESSOR   Used for:  Essential hypertension   Started by:  Kelvin Keenan MD        Take one half pill two times daily   Quantity:  60 tablet   Refills:  3            Where to get your medicines      These medications were sent to [facility] [address] [address]  [address], [facility] [address]     Phone:  [phone]     metoprolol 25 MG tablet                Primary Care Provider Office Phone # Fax #    Kelvin Keenan [phone]       [address] [place], [place] [id number]        Equal Access to Services     [facility] : Hadii clifford reagan hadasho Sokristina, waaxda luqadaha, qaybta kaalmada lindsey connell. So [facility] [phone].    ATENCIÓN: Si habla español, tiene a wang disposición servicios gratuitos de asistencia lingüística. Llame al [phone].    We comply with applicable federal civil rights laws and [place]  laws. We do not discriminate on the basis of race, color, national origin, age, disability, sex, sexual orientation, or gender identity.            Thank you!     Thank you for choosing [facility]  for your care. Our goal is always to provide you with excellent care. Hearing back from our patients is one way we can continue to improve our services. Please take a few minutes to complete the written survey that you may receive in the mail after your visit with us. Thank you!             Your Updated Medication List - Protect others around you: Learn how to safely use, store and throw away your medicines at www.disposemymeds.org.          This list is accurate as of: 12/5/17 10:15 AM.  Always use your most recent med list.                   Brand Name Dispense Instructions for use Diagnosis    * albuterol (2.5 MG/3ML) 0.083% neb solution     25 vial    Take 1 vial (2.5 mg) by nebulization every 6 hours as needed for shortness of breath / dyspnea or wheezing    Acute bronchitis with symptoms > 10 days       * albuterol 108 (90 BASE) MCG/ACT Inhaler    PROAIR HFA/PROVENTIL HFA/VENTOLIN HFA    1 Inhaler    Inhale 2 puffs into the lungs every 6 hours as needed for shortness of breath / dyspnea or wheezing    Acute bronchitis, unspecified organism       ALLERGEN IMMUNOTHERAPY PRESCRIPTION     5 mL    Mix #3  Tree , [place], White  GLY1:20 w/v, HS  0.3ml Birch Mix GLY1:20 w/v, HS  0.3ml [place] GLY1:20 w/v, HS  0.3ml Elm, American GLY1:20 w/v, HS  0.3ml [place], Black GLY1:20 w/v 0.3ml [place] GLY 1:20 w/v, HS 0.3ml [place] GLY 1:20 w/v, ALK 0.3ml [place]overtyweed GLY 1:20 w/v, HS 0.3ml Nettle GLY 1:20 w/v, HS 0.3ml Ragweed, Short GLY 1:20 w/v HS 0.3ml Russian Thistle GLY 1:20 w/v, HS 0.3ml Diluent: HSAqs to 5ml    Chronic rhinitis       amLODIPine 5 MG tablet    NORVASC    180 tablet    Take 2 tablets (10 mg) by mouth daily    Benign essential hypertension       aspirin 81 MG tablet       Take 1 tablet by mouth daily.        atorvastatin 20 MG tablet    LIPITOR    90 tablet    Take 1 tablet (20 mg) by mouth daily    Pure hypercholesterolemia       blood glucose monitoring test strip    no brand specified    2 Box    1 strip by In Vitro route daily Test 1x daily. Accu-Chek Marlene test strips    Type 2 diabetes mellitus with diabetic nephropathy, with long-term current use of insulin (H)       calcium carb 1250 mg (500 mg [place])/vitamin D 200 unit 500-200 MG-UNIT per tablet    OSCAL with D     Take one tablet twice daily with food        glipiZIDE 5 MG 24 hr tablet    glipiZIDE XL    90 tablet    Take 1 tablet (5 mg) by mouth daily    Type 2 diabetes mellitus with diabetic nephropathy, with long-term current use of insulin (H)       lisinopril 40 MG tablet    PRINIVIL/ZESTRIL    180 tablet    Take 1 tablet (40 mg) by mouth 2 times daily    CKD (chronic kidney disease) stage 1, GFR 90 ml/min or greater       magnesium 500 MG Tabs      Take 500 mg by mouth daily        metFORMIN 500 MG tablet    GLUCOPHAGE    360 tablet    TAKE TWO TABLETS TWICE DAILY, WITH FOOD.    Type 2 diabetes mellitus with diabetic nephropathy, with long-term current use of insulin (H)       metoprolol 25 MG tablet    LOPRESSOR    60 tablet    Take one half pill two times daily    Essential hypertension       order for DME     1 Units    Nebulizer    Acute bronchitis with symptoms > 10 days       Potassium Gluconate 595 MG Caps      Take 595 mg by mouth daily        Selenium 200 MCG Tabs      Take 200 mcg by mouth daily        tamsulosin 0.4 MG capsule    FLOMAX     Take 0.4 mg by mouth daily        VITAMIN B COMPLEX PO      Take 1 tablet by mouth daily.        * Notice:  This list has 2 medication(s) that are the same as other medications prescribed for you. Read the directions carefully, and ask your doctor or other care provider to review them with you.

## 2017-12-05 NOTE — NURSING NOTE
"Chief Complaint   Patient presents with     Hypertension       Initial /75 (BP Location: Right arm, Patient Position: Chair, Cuff Size: Adult Regular)  Pulse 67  Temp 97.6  F (36.4  C) (Tympanic)  Wt 181 lb 12.8 oz (82.5 kg)  BMI 29.34 kg/m2 Estimated body mass index is 29.34 kg/(m^2) as calculated from the following:    Height as of 9/19/17: 5' 6\" (1.676 m).    Weight as of this encounter: 181 lb 12.8 oz (82.5 kg).  Medication Reconciliation: complete    Health Maintenance that is potentially due pending provider review:  Colonoscopy/FIT    Patient states that he did a fit test however that came back positive. Patient will discuss with Dr. Keenan.    Is there anyone who you would like to be able to receive your results? No  If yes have patient fill out LOR[place]    Noemi WEAVER CMA    "

## 2017-12-06 ENCOUNTER — HOSPITAL ENCOUNTER (OUTPATIENT)
Dept: PHYSICAL THERAPY | Facility: CLINIC | Age: 72
Setting detail: THERAPIES SERIES
End: 2017-12-06
Attending: NURSE PRACTITIONER
Payer: MEDICARE

## 2017-12-06 PROCEDURE — 97110 THERAPEUTIC EXERCISES: CPT | Mod: GP

## 2017-12-06 PROCEDURE — 97140 MANUAL THERAPY 1/> REGIONS: CPT | Mod: GP

## 2017-12-06 PROCEDURE — 40000718 ZZHC STATISTIC PT DEPARTMENT ORTHO VISIT

## 2017-12-21 DIAGNOSIS — I10 ESSENTIAL HYPERTENSION: ICD-10-CM

## 2017-12-21 LAB
ANION GAP SERPL CALCULATED.3IONS-SCNC: 4 MMOL/L (ref 3–14)
BUN SERPL-MCNC: 18 MG/DL (ref 7–30)
CALCIUM SERPL-MCNC: 9.2 MG/DL (ref 8.5–10.1)
CHLORIDE SERPL-SCNC: 95 MMOL/L (ref 94–109)
CO2 SERPL-SCNC: 30 MMOL/L (ref 20–32)
CREAT SERPL-MCNC: 1.25 MG/DL (ref 0.66–1.25)
GFR SERPL CREATININE-BSD FRML MDRD: 57 ML/MIN/1.7M2
GLUCOSE SERPL-MCNC: 152 MG/DL (ref 70–99)
POTASSIUM SERPL-SCNC: 4.4 MMOL/L (ref 3.4–5.3)
SODIUM SERPL-SCNC: 129 MMOL/L (ref 133–144)

## 2017-12-21 PROCEDURE — 80048 BASIC METABOLIC PNL TOTAL CA: CPT | Performed by: FAMILY MEDICINE

## 2017-12-21 PROCEDURE — 36415 COLL VENOUS BLD VENIPUNCTURE: CPT | Performed by: FAMILY MEDICINE

## 2017-12-22 ENCOUNTER — ALLIED HEALTH/NURSE VISIT (OUTPATIENT)
Dept: FAMILY MEDICINE | Facility: CLINIC | Age: 72
End: 2017-12-22
Payer: COMMERCIAL

## 2017-12-22 ENCOUNTER — TELEPHONE (OUTPATIENT)
Dept: FAMILY MEDICINE | Facility: CLINIC | Age: 72
End: 2017-12-22

## 2017-12-22 VITALS — DIASTOLIC BLOOD PRESSURE: 70 MMHG | SYSTOLIC BLOOD PRESSURE: 136 MMHG

## 2017-12-22 DIAGNOSIS — Z01.30 BLOOD PRESSURE CHECK: Primary | ICD-10-CM

## 2017-12-22 PROCEDURE — 99207 ZZC NO CHARGE NURSE ONLY: CPT | Performed by: FAMILY MEDICINE

## 2017-12-22 NOTE — PROGRESS NOTES
Sae Milligan is enrolled/participating in the retail pharmacy Blood Pressure Goals Achievement Program (BPGAP).  Sae Milligan was evaluated at [facility] on December 22, 2017 at which time his blood pressure was:    BP Readings from Last 3 Encounters:   12/22/17 136/70   12/05/17 147/75   11/29/17 132/62     Reviewed lifestyle modifications for blood pressure control and reduction: including making healthy food choices, managing weight, getting regular exercise, smoking cessation, reducing alcohol consumption, monitoring blood pressure regularly.     Sae Milligan is not experiencing symptoms.    Follow-Up: BP is at goal of < 140/90mmHg (patient 18+ years of age with or without diabetes).  Recommended follow-up at pharmacy in 6 months.     Recommendation to Provider: Continue Current Therapy    Sae Milligan was evaluated for enrollment into the PGEN study today.    Patient eligible for enrollment:  No  Patient interested in enrollment:  No    Completed by: Ney Kelly PharmD - [place] Pharmacist, on behalf of [facility]

## 2017-12-22 NOTE — TELEPHONE ENCOUNTER
Blood pressure today 132/67 and this morning was 150/unsure of the bottom. Patient states has been running in the 140-150's and not sure of bottom number. Patient is leaving out of [place] till February next Tuesday, so advised him to have it checked today at the pharmacy to get an accurate reading. Patient agrees with the plan and will come in today at the pharmacy.  JOHN Dunn

## 2017-12-22 NOTE — MR AVS SNAPSHOT
After Visit Summary   12/22/2017    Sae Milligan    MRN: [id number]           Patient Information     Date Of Birth          1945        Visit Information        Provider Department      12/22/2017 4:37 PM Kelvin Keenan MD [facility]        Today's Diagnoses     Blood pressure check    -  1       Follow-ups after your visit        Your next 10 appointments already scheduled     Mar 02, 2018   Procedure with Aris Whittaker MD   [facility] ([facility])    [address] [id number] [phone]           The medical center is located at [address] (between [address] in [place], [place] miles north of [place]).              Who to contact     If you have questions or need follow up information about today's clinic visit or your schedule please contact [facility] directly at [phone].  Normal or non-critical lab and imaging results will be communicated to you by MyChart, letter or phone within 4 business days after the clinic has received the results. If you do not hear from us within 7 days, please contact the clinic through [facility] or phone. If you have a critical or abnormal lab result, we will notify you by phone as soon as possible.  Submit refill requests through [facility] or call your pharmacy and they will forward the refill request to us. Please allow 3 business days for your refill to be completed.          Additional Information About Your Visit        MyChart Information     [facility] gives you secure access to your electronic health record. If you see a primary care provider, you can also send messages to your care team and make appointments. If you have questions, please call your primary care clinic.  If you do not have a primary care provider, please call [phone] and they will assist you.        Care EveryWhere ID     This is your Care EveryWhere ID. This could be  used by other organizations to access your [place] medical records  [id number]         Blood Pressure from Last 3 Encounters:   12/22/17 136/70   12/05/17 147/75   11/29/17 132/62    Weight from Last 3 Encounters:   12/05/17 181 lb 12.8 oz (82.5 kg)   11/21/17 183 lb (83 kg)   10/14/17 184 lb 6.4 oz (83.6 kg)              Today, you had the following     No orders found for display       Primary Care Provider Office Phone # Fax #    Kelvin Keenan [phone]       [address] [id number]        Equal Access to Services     KACEY GARLAND : Yeny jay Sokristina, waradhamesda amanda, qalarisata kaalmada becki, lindsey saldivar. So [facility] [phone].    ATENCIÓN: Si habla español, tiene a wang disposición servicios gratuitos de asistencia lingüística. Llame al [phone].    We comply with applicable federal civil rights laws and [place] laws. We do not discriminate on the basis of race, color, national origin, age, disability, sex, sexual orientation, or gender identity.            Thank you!     Thank you for choosing [facility]  for your care. Our goal is always to provide you with excellent care. Hearing back from our patients is one way we can continue to improve our services. Please take a few minutes to complete the written survey that you may receive in the mail after your visit with us. Thank you!             Your Updated Medication List - Protect others around you: Learn how to safely use, store and throw away your medicines at www.disposemymeds.org.          This list is accurate as of: 12/22/17  4:40 PM.  Always use your most recent med list.                   Brand Name Dispense Instructions for use Diagnosis    * albuterol (2.5 MG/3ML) 0.083% neb solution     25 vial    Take 1 vial (2.5 mg) by nebulization every 6 hours as needed for shortness of breath / dyspnea or wheezing    Acute bronchitis with  symptoms > 10 days       * albuterol 108 (90 BASE) MCG/ACT Inhaler    PROAIR HFA/PROVENTIL HFA/VENTOLIN HFA    1 Inhaler    Inhale 2 puffs into the lungs every 6 hours as needed for shortness of breath / dyspnea or wheezing    Acute bronchitis, unspecified organism       ALLERGEN IMMUNOTHERAPY PRESCRIPTION     5 mL    Mix #3  Tree , Weeds Tm, White  GLY1:20 w/v, HS  0.3ml Birch Mix GLY1:20 w/v, HS  0.3ml Heber GLY1:20 w/v, HS  0.3ml Elm, American GLY1:20 w/v, HS  0.3ml [place], Black GLY1:20 w/v 0.3ml [place] GLY 1:20 w/v, HS 0.3ml Lamb's Quarters GLY 1:20 w/v, ALK 0.3ml Marshelder-Povertyweed GLY 1:20 w/v, HS 0.3ml Nettle GLY 1:20 w/v, HS 0.3ml Ragweed, Short GLY 1:20 w/v HS 0.3ml Russian Thistle GLY 1:20 w/v, HS 0.3ml Diluent: HSAqs to 5ml    Chronic rhinitis       amLODIPine 5 MG tablet    NORVASC    180 tablet    Take 2 tablets (10 mg) by mouth daily    Benign essential hypertension       aspirin 81 MG tablet      Take 1 tablet by mouth daily.        atorvastatin 20 MG tablet    LIPITOR    90 tablet    Take 1 tablet (20 mg) by mouth daily    Pure hypercholesterolemia       blood glucose monitoring test strip    no brand specified    2 Box    1 strip by In Vitro route daily Test 1x daily. Accu-Chek Marlene test strips    Type 2 diabetes mellitus with diabetic nephropathy, with long-term current use of insulin (H)       calcium carb 1250 mg (500 mg [place])/vitamin D 200 unit 500-200 MG-UNIT per tablet    OSCAL with D     Take one tablet twice daily with food        glipiZIDE 5 MG 24 hr tablet    glipiZIDE XL    90 tablet    Take 1 tablet (5 mg) by mouth daily    Type 2 diabetes mellitus with diabetic nephropathy, with long-term current use of insulin (H)       lisinopril 40 MG tablet    PRINIVIL/ZESTRIL    180 tablet    Take 1 tablet (40 mg) by mouth 2 times daily    CKD (chronic kidney disease) stage 1, GFR 90 ml/min or greater       magnesium 500 MG Tabs      Take 500 mg by mouth daily        metFORMIN 500 MG  tablet    GLUCOPHAGE    360 tablet    TAKE TWO TABLETS TWICE DAILY, WITH FOOD.    Type 2 diabetes mellitus with diabetic nephropathy, with long-term current use of insulin (H)       metoprolol 25 MG tablet    LOPRESSOR    60 tablet    Take one half pill two times daily    Essential hypertension       order for DME     1 Units    Nebulizer    Acute bronchitis with symptoms > 10 days       Potassium Gluconate 595 MG Caps      Take 595 mg by mouth daily        Selenium 200 MCG Tabs      Take 200 mcg by mouth daily        tamsulosin 0.4 MG capsule    FLOMAX     Take 0.4 mg by mouth daily        VITAMIN B COMPLEX PO      Take 1 tablet by mouth daily.        * Notice:  This list has 2 medication(s) that are the same as other medications prescribed for you. Read the directions carefully, and ask your doctor or other care provider to review them with you.

## 2017-12-22 NOTE — TELEPHONE ENCOUNTER
"Reason for call:  Patient reporting a symptom    Symptom or request: Dr Keenan wanted to know how patient's BP has been. Patient reports his readings have average 150 on top he does not remember his bottom number. Patient states \"it has not come down much.\"    Duration (how long have symptoms been present): ongoing    Have you been treated for this before? Yes    Additional comments: none    Phone Number patient can be reached at:  Home number on file [phone] (home)    Best Time:  any    Can we leave a detailed message on this number:  YES    Call taken on 12/22/2017 at 2:10 PM by Eliza Rodriguez    "

## 2018-01-16 ENCOUNTER — TELEPHONE (OUTPATIENT)
Dept: FAMILY MEDICINE | Facility: CLINIC | Age: 73
End: 2018-01-16

## 2018-01-16 DIAGNOSIS — I10 ESSENTIAL HYPERTENSION: ICD-10-CM

## 2018-01-16 RX ORDER — METOPROLOL TARTRATE 25 MG/1
25 TABLET, FILM COATED ORAL 2 TIMES DAILY
Qty: 180 TABLET | Refills: 0 | Status: SHIPPED | OUTPATIENT
Start: 2018-01-16 | End: 2018-03-01

## 2018-01-16 NOTE — TELEPHONE ENCOUNTER
Sae says he is in [place] for a month. He says he was working with Dr Keenan before he left. He has been getting high reading  171/78, 155/71, 156/73,153/70 and he just got 161/65  He is taking Amlodipine, Lisinopril and Metoprolol. He says he currently takes 1/2 of the Metoprolol in the morning and 1/2 at night. He wonders if he should bump this to one full pill AM and PM.    [phone]

## 2018-01-16 NOTE — TELEPHONE ENCOUNTER
Blood pressures look like high.  Discussed these with Dr Keenan and he can increase the metoprolol to 25 mg BID.  Keep us posted on the blood pressures  Chioma Del Toro RN

## 2018-01-25 ENCOUNTER — TELEPHONE (OUTPATIENT)
Dept: FAMILY MEDICINE | Facility: CLINIC | Age: 73
End: 2018-01-25

## 2018-01-25 NOTE — TELEPHONE ENCOUNTER
Sae says he talked to Johanna PARRISH last week about his BP and then increased his dose of Metoprolol.  He was to call Johanna this week with his reading and to let her know how he's doing.  651-7  BP and Pulse Readings  140/65  60  153/69  70  146/75  51  154/70  59  153/72  57  135/93  60  144/73  58  TODAY  164/78  63  -

## 2018-01-25 NOTE — TELEPHONE ENCOUNTER
I talked with Sae and told him that I talked Dr Keenan and he said to continue same meds.  If BP >150 systolic consistently call.  He is coming home from [place] in mid February  Chioma Del Toro RN

## 2018-02-08 NOTE — DISCHARGE SUMMARY
Outpatient Physical Therapy Discharge Note     Patient: Sae Milligan  : 1945    Beginning/End Dates of Reporting Period:  17 to 2017    Referring Provider: Dionna Velez APRN CNP     Therapy Diagnosis: R shoulder impingement     Client Self Report: Pt states sleeping on side is getting better.    Objective Measurements:  Objective Measure: AROM R shoulder  Details: flex=160, abd=129 (pinch in sup shoulder), ER=88, IR=27                      Outcome Measures (most recent score):  Patient did not attend follow up visit, status unknown at this time.      Goals:  Goal Identifier reaching mid back   Goal Description Following PT interventions, pt will have equal B GHJ IR AROM to have less difficutly reaching mid back   Target Date 17   Date Met   (increased motion)   Progress:     Goal Identifier less pain with sleep on side   Goal Description Following PT interventions, pt will test negative for Denisa's test on R shoulder for less pain with sleep on side   Target Date 17   Date Met   (increased R shoulder flex AROM)   Progress:         Progress Toward Goals:   Progress this reporting period: Patient participated in 4 skilled PT treatment sessions over 3 weeks with progress made towards all goals, increased motion, less pain in R shoulder.    Patient did not return for follow up treatments as directed. Last skilled PT treatment visit was more than 30 days ago. Goal status and current objective information is therefore unknown.  Discharge from PT services at this time for this episode of treatment. Please see attached documentation under this episode of care for further information including dates of service, start of care date, referring physician, diagnosis, treatment plan, treatments, etc.    Please contact me with any questions or concerns.    Thank you for your referral.    El Gamez, PT, DPT  Doctor of Physical Therapy  [facility]  [facility] [place]  [phone]            Plan:  Discharge from therapy.    Discharge:    Reason for Discharge: Patient has failed to schedule further appointments.  Medicare G-code: Patient did not attend a final scheduled session prior to discharge. Unable to determine discharge functional status.    Equipment Issued: t-bands    Discharge Plan: Discharge plan not established prior to last skilled PT visit.

## 2018-02-28 ENCOUNTER — ANESTHESIA EVENT (OUTPATIENT)
Dept: GASTROENTEROLOGY | Facility: CLINIC | Age: 73
End: 2018-02-28
Payer: MEDICARE

## 2018-02-28 ASSESSMENT — LIFESTYLE VARIABLES: TOBACCO_USE: 1

## 2018-02-28 NOTE — ANESTHESIA PREPROCEDURE EVALUATION
Anesthesia Evaluation     . Pt has had prior anesthetic. Type: MAC and General    No history of anesthetic complications          ROS/MED HX    ENT/Pulmonary:     (+)allergic rhinitis, tobacco use, Past use Intermittent asthma Treatment: Inhaler prn and Inhaler daily,  , . .    Neurologic:     (+)other neuro RLS    Cardiovascular:     (+) Dyslipidemia, ----. : . . . :. .       METS/Exercise Tolerance:     Hematologic:  - neg hematologic  ROS       Musculoskeletal:   (+) , , other musculoskeletal- DJD      GI/Hepatic:  - neg GI/hepatic ROS       Renal/Genitourinary:     (+) chronic renal disease (CKD stage 1),       Endo:     (+) type II DM Not using insulin Diabetic complications: nephropathy, .      Psychiatric:     (+) psychiatric history anxiety      Infectious Disease:  - neg infectious disease ROS       Malignancy:   (+) Malignancy History of Skin          Other:                                    Anesthesia Plan      History & Physical Review  History and physical reviewed and following examination; no interval change.    ASA Status:  3 .    NPO Status:  > 4 hours    Plan for MAC Reason for MAC:  Deep or markedly invasive procedure (G8)         Postoperative Care      Consents  Anesthetic plan, risks, benefits and alternatives discussed with:  Patient..                          .

## 2018-03-01 DIAGNOSIS — N18.1 CKD (CHRONIC KIDNEY DISEASE) STAGE 1, GFR 90 ML/MIN OR GREATER: ICD-10-CM

## 2018-03-01 DIAGNOSIS — I10 ESSENTIAL HYPERTENSION: ICD-10-CM

## 2018-03-01 RX ORDER — LISINOPRIL 40 MG/1
TABLET ORAL
Qty: 180 TABLET | Refills: 1 | Status: SHIPPED | OUTPATIENT
Start: 2018-03-01 | End: 2018-05-22

## 2018-03-01 RX ORDER — METOPROLOL TARTRATE 25 MG/1
25 TABLET, FILM COATED ORAL 2 TIMES DAILY
Qty: 60 TABLET | Refills: 0 | Status: SHIPPED | OUTPATIENT
Start: 2018-03-01 | End: 2018-05-22

## 2018-03-01 NOTE — TELEPHONE ENCOUNTER
"Requested Prescriptions   Pending Prescriptions Disp Refills     lisinopril (PRINIVIL/ZESTRIL) 40 MG tablet [Pharmacy Med Name: LISINOPRIL 40 MG Tablet] 180 tablet 0     Sig: TAKE 1 TABLET TWICE DAILY    ACE Inhibitors (Including Combos) Protocol Passed    3/1/2018  8:03 AM       Passed - Blood pressure under 140/90 in past 12 months    BP Readings from Last 3 Encounters:   12/22/17 136/70   12/05/17 147/75   11/29/17 132/62                Passed - Recent or future visit with authorizing provider's specialty    Patient had office visit in the last year or has a visit in the next 30 days with authorizing provider.  See \"Patient Info\" tab in inbasket, or \"Choose Columns\" in Meds & Orders section of the refill encounter.            Passed - Patient is age 18 or older       Passed - Normal serum creatinine on file in past 12 months    Recent Labs   Lab Test  12/21/17   0751   CR  1.25            Passed - Normal serum potassium on file in past 12 months    Recent Labs   Lab Test  12/21/17   0751   POTASSIUM  4.4             Last Written Prescription Date:  11/27/17  Last Fill Quantity: 180,  # refills: 0   Last office visit: 12/5/2017 with prescribing provider:     Future Office Visit:      "

## 2018-03-01 NOTE — TELEPHONE ENCOUNTER
"Sae says he was filling his weekly pill box and can't find his Metoprolol bottle. He called [employer] and they said it can't be filled until March 18th.  Sae is asking if we could send Rx for #60 to [facility] in [place] and he will pay for them.    Requested Prescriptions   Pending Prescriptions Disp Refills     metoprolol tartrate (LOPRESSOR) 25 MG tablet 60 tablet 0     Sig: Take 1 tablet (25 mg) by mouth 2 times daily    Beta-Blockers Protocol Passed    3/1/2018 11:12 AM       Passed - Blood pressure under 140/90 in past 12 months    BP Readings from Last 3 Encounters:   12/22/17 136/70   12/05/17 147/75   11/29/17 132/62                Passed - Patient is age 6 or older       Passed - Recent or future visit with authorizing provider's specialty    Patient had office visit in the last year or has a visit in the next 30 days with authorizing provider.  See \"Patient Info\" tab in inbasket, or \"Choose Columns\" in Meds & Orders section of the refill encounter.             Last Written Prescription Date:  1/16/18  Last Fill Quantity: 180,  # refills: 0   Last office visit: 12/5/2017 with prescribing provider:  AUTUMN Keenan   Future Office Visit:      "

## 2018-03-02 ENCOUNTER — ANESTHESIA (OUTPATIENT)
Dept: GASTROENTEROLOGY | Facility: CLINIC | Age: 73
End: 2018-03-02
Payer: MEDICARE

## 2018-03-02 ENCOUNTER — SURGERY (OUTPATIENT)
Age: 73
End: 2018-03-02

## 2018-03-02 ENCOUNTER — HOSPITAL ENCOUNTER (OUTPATIENT)
Facility: CLINIC | Age: 73
Discharge: HOME | End: 2018-03-02
Attending: SURGERY | Admitting: SURGERY
Payer: MEDICARE

## 2018-03-02 VITALS
SYSTOLIC BLOOD PRESSURE: 114 MMHG | TEMPERATURE: 97.7 F | DIASTOLIC BLOOD PRESSURE: 70 MMHG | WEIGHT: 181 LBS | BODY MASS INDEX: 29.09 KG/M2 | OXYGEN SATURATION: 97 % | RESPIRATION RATE: 16 BRPM | HEIGHT: 66 IN

## 2018-03-02 LAB
COLONOSCOPY: NORMAL
GLUCOSE BLDC GLUCOMTR-MCNC: 182 MG/DL (ref 70–99)

## 2018-03-02 PROCEDURE — 82962 GLUCOSE BLOOD TEST: CPT

## 2018-03-02 PROCEDURE — 25000128 H RX IP 250 OP 636: Performed by: SURGERY

## 2018-03-02 PROCEDURE — 37000008 ZZH ANESTHESIA TECHNICAL FEE, 1ST 30 MIN: Performed by: SURGERY

## 2018-03-02 PROCEDURE — G0121 COLON CA SCRN NOT HI RSK IND: HCPCS | Performed by: SURGERY

## 2018-03-02 PROCEDURE — 25000125 ZZHC RX 250: Performed by: NURSE ANESTHETIST, CERTIFIED REGISTERED

## 2018-03-02 PROCEDURE — 45378 DIAGNOSTIC COLONOSCOPY: CPT | Performed by: SURGERY

## 2018-03-02 PROCEDURE — 25000125 ZZHC RX 250: Performed by: SURGERY

## 2018-03-02 PROCEDURE — 25000128 H RX IP 250 OP 636: Performed by: NURSE ANESTHETIST, CERTIFIED REGISTERED

## 2018-03-02 RX ORDER — GLYCOPYRROLATE 0.2 MG/ML
INJECTION, SOLUTION INTRAMUSCULAR; INTRAVENOUS PRN
Status: DISCONTINUED | OUTPATIENT
Start: 2018-03-02 | End: 2018-03-02

## 2018-03-02 RX ORDER — ONDANSETRON 2 MG/ML
4 INJECTION INTRAMUSCULAR; INTRAVENOUS
Status: DISCONTINUED | OUTPATIENT
Start: 2018-03-02 | End: 2018-03-02 | Stop reason: HOSPADM

## 2018-03-02 RX ORDER — SODIUM CHLORIDE, SODIUM LACTATE, POTASSIUM CHLORIDE, CALCIUM CHLORIDE 600; 310; 30; 20 MG/100ML; MG/100ML; MG/100ML; MG/100ML
INJECTION, SOLUTION INTRAVENOUS CONTINUOUS
Status: DISCONTINUED | OUTPATIENT
Start: 2018-03-02 | End: 2018-03-02 | Stop reason: HOSPADM

## 2018-03-02 RX ORDER — PROPOFOL 10 MG/ML
INJECTION, EMULSION INTRAVENOUS PRN
Status: DISCONTINUED | OUTPATIENT
Start: 2018-03-02 | End: 2018-03-02

## 2018-03-02 RX ORDER — PROPOFOL 10 MG/ML
INJECTION, EMULSION INTRAVENOUS CONTINUOUS PRN
Status: DISCONTINUED | OUTPATIENT
Start: 2018-03-02 | End: 2018-03-02

## 2018-03-02 RX ORDER — LIDOCAINE 40 MG/G
CREAM TOPICAL
Status: DISCONTINUED | OUTPATIENT
Start: 2018-03-02 | End: 2018-03-02 | Stop reason: HOSPADM

## 2018-03-02 RX ORDER — LIDOCAINE HYDROCHLORIDE 10 MG/ML
INJECTION, SOLUTION INFILTRATION; PERINEURAL PRN
Status: DISCONTINUED | OUTPATIENT
Start: 2018-03-02 | End: 2018-03-02

## 2018-03-02 RX ADMIN — LIDOCAINE HYDROCHLORIDE 0.2 ML: 10 INJECTION, SOLUTION EPIDURAL; INFILTRATION; INTRACAUDAL; PERINEURAL at 07:15

## 2018-03-02 RX ADMIN — PROPOFOL 175 MCG/KG/MIN: 10 INJECTION, EMULSION INTRAVENOUS at 07:38

## 2018-03-02 RX ADMIN — LIDOCAINE HYDROCHLORIDE 50 MG: 10 INJECTION, SOLUTION INFILTRATION; PERINEURAL at 07:36

## 2018-03-02 RX ADMIN — SODIUM CHLORIDE, POTASSIUM CHLORIDE, SODIUM LACTATE AND CALCIUM CHLORIDE: 600; 310; 30; 20 INJECTION, SOLUTION INTRAVENOUS at 07:16

## 2018-03-02 RX ADMIN — PROPOFOL 50 MG: 10 INJECTION, EMULSION INTRAVENOUS at 07:42

## 2018-03-02 RX ADMIN — GLYCOPYRROLATE 0.2 MG: 0.2 INJECTION, SOLUTION INTRAMUSCULAR; INTRAVENOUS at 07:35

## 2018-03-02 RX ADMIN — PROPOFOL 100 MG: 10 INJECTION, EMULSION INTRAVENOUS at 07:36

## 2018-03-02 NOTE — ANESTHESIA CARE TRANSFER NOTE
Patient: Sae Milligan    Procedure(s):  colonoscopy - Wound Class: II-Clean Contaminated    Diagnosis: screening  Diagnosis Additional Information: No value filed.    Anesthesia Type:   No value filed.     Note:  Airway :Room Air  Patient transferred to:Phase II  Comments: Patient's VSS. Spontaneous respirations. Patient awake and oriented. IV patent. Report to RN.Handoff Report: Identifed the Patient, Identified the Reponsible Provider, Reviewed the pertinent medical history, Discussed the surgical course, Reviewed Intra-OP anesthesia mangement and issues during anesthesia, Set expectations for post-procedure period and Allowed opportunity for questions and acknowledgement of understanding      Vitals: (Last set prior to Anesthesia Care Transfer)    CRNA VITALS  3/2/2018 0733 - 3/2/2018 0803      3/2/2018             Pulse: 57    SpO2: 98 %                Electronically Signed By: ANGEL Velásquez CRNA  March 2, 2018  8:03 AM

## 2018-03-02 NOTE — ANESTHESIA POSTPROCEDURE EVALUATION
Patient: Sae Milligan    Procedure(s):  colonoscopy - Wound Class: II-Clean Contaminated    Diagnosis:screening  Diagnosis Additional Information: No value filed.    Anesthesia Type:  No value filed.    Note:  Anesthesia Post Evaluation    Patient location during evaluation: Bedside  Patient participation: Able to fully participate in evaluation  Level of consciousness: awake and alert  Pain management: adequate  Airway patency: patent  Cardiovascular status: acceptable  Respiratory status: acceptable  Hydration status: acceptable  PONV: none     Anesthetic complications: None          Last vitals:  Vitals:    03/02/18 0701 03/02/18 0710 03/02/18 0807   BP: (!) 81/48 109/59 120/65   Resp:  16 16   Temp:      SpO2:  97% 97%         Electronically Signed By: ANGEL Velásquez CRNA  March 2, 2018  8:23 AM

## 2018-03-02 NOTE — H&P
"72 year old year old male here for colonoscopy for screening.    Patient Active Problem List   Diagnosis     Type 2 diabetes mellitus with diabetic nephropathy (H)     Hyperlipidemia LDL goal <100     Other chronic allergic conjunctivitis     DJD (degenerative joint disease)     Health Care Home     Allergic rhinitis     CKD (chronic kidney disease) stage 1, GFR 90 ml/min or greater     Squamous cell carcinoma     History of squamous cell carcinoma     SK (seborrheic keratosis)     Lentigo     Family history of prostate cancer     Restless leg syndrome     Anxiety       Past Medical History:   Diagnosis Date     Diabetes mellitus (H)      Squamous cell carcinoma        Past Surgical History:   Procedure Laterality Date     ARTHROPLASTY KNEE  2/10/2011    ARTHROPLASTY KNEE performed by DARLING FARRELL at [facility]     MOHS MICROGRAPHIC PROCEDURE  july 2012    Sq. cell left helix     ORTHOPEDIC SURGERY       SURGICAL HISTORY OF -   12/2003    Cervical spine fx       @[facility]@    No current outpatient prescriptions on file.       Allergies   Allergen Reactions     Penicillins Hives, Itching and Swelling     Of lips       Pt reports that he quit smoking about 27 years ago. He has never used smokeless tobacco. He reports that he drinks alcohol. He reports that he does not use illicit drugs.    Exam:  /86  Temp 97.7  F (36.5  C) (Oral)  Resp 16  Ht 1.676 m (5' 6\")  Wt 82.1 kg (181 lb)  SpO2 99%  BMI 29.21 kg/m2    Awake, Alert OX3  Lungs - CTA bilaterally  CV - RRR, no murmurs, distal pulses intact  Abd - soft, non-distended, non-tender, +BS  Extr - No cyanosis or edema    A/P 72 year old year old male in need of colonoscopy for screening. Risks, benefits, alternatives, and complications were discussed including the possibility of perforation and the patient agreed to proceed    Aris Whittaker MD   "

## 2018-03-23 ENCOUNTER — TELEPHONE (OUTPATIENT)
Dept: FAMILY MEDICINE | Facility: CLINIC | Age: 73
End: 2018-03-23

## 2018-03-26 ENCOUNTER — MEDICAL CORRESPONDENCE (OUTPATIENT)
Dept: HEALTH INFORMATION MANAGEMENT | Facility: CLINIC | Age: 73
End: 2018-03-26

## 2018-04-23 ENCOUNTER — DOCUMENTATION ONLY (OUTPATIENT)
Dept: LAB | Facility: CLINIC | Age: 73
End: 2018-04-23

## 2018-04-23 DIAGNOSIS — E11.21 TYPE 2 DIABETES MELLITUS WITH DIABETIC NEPHROPATHY, WITHOUT LONG-TERM CURRENT USE OF INSULIN (H): Primary | ICD-10-CM

## 2018-04-24 DIAGNOSIS — E11.21 TYPE 2 DIABETES MELLITUS WITH DIABETIC NEPHROPATHY, WITHOUT LONG-TERM CURRENT USE OF INSULIN (H): ICD-10-CM

## 2018-04-24 DIAGNOSIS — N18.1 CKD (CHRONIC KIDNEY DISEASE) STAGE 1, GFR 90 ML/MIN OR GREATER: Primary | ICD-10-CM

## 2018-04-24 LAB
ANION GAP SERPL CALCULATED.3IONS-SCNC: 4 MMOL/L (ref 3–14)
BUN SERPL-MCNC: 16 MG/DL (ref 7–30)
CALCIUM SERPL-MCNC: 8.6 MG/DL (ref 8.5–10.1)
CHLORIDE SERPL-SCNC: 99 MMOL/L (ref 94–109)
CO2 SERPL-SCNC: 26 MMOL/L (ref 20–32)
CREAT SERPL-MCNC: 1.14 MG/DL (ref 0.66–1.25)
GFR SERPL CREATININE-BSD FRML MDRD: 63 ML/MIN/1.7M2
GLUCOSE SERPL-MCNC: 160 MG/DL (ref 70–99)
HBA1C MFR BLD: 6.2 % (ref 0–5.6)
POTASSIUM SERPL-SCNC: 4.9 MMOL/L (ref 3.4–5.3)
SODIUM SERPL-SCNC: 129 MMOL/L (ref 133–144)

## 2018-04-24 PROCEDURE — 83036 HEMOGLOBIN GLYCOSYLATED A1C: CPT | Performed by: FAMILY MEDICINE

## 2018-04-24 PROCEDURE — 36415 COLL VENOUS BLD VENIPUNCTURE: CPT | Performed by: FAMILY MEDICINE

## 2018-04-24 PROCEDURE — 80048 BASIC METABOLIC PNL TOTAL CA: CPT | Performed by: FAMILY MEDICINE

## 2018-05-22 ENCOUNTER — OFFICE VISIT (OUTPATIENT)
Dept: FAMILY MEDICINE | Facility: CLINIC | Age: 73
End: 2018-05-22
Payer: COMMERCIAL

## 2018-05-22 VITALS
RESPIRATION RATE: 16 BRPM | WEIGHT: 187.6 LBS | BODY MASS INDEX: 30.15 KG/M2 | SYSTOLIC BLOOD PRESSURE: 134 MMHG | DIASTOLIC BLOOD PRESSURE: 71 MMHG | TEMPERATURE: 97.7 F | HEIGHT: 66 IN | HEART RATE: 53 BPM

## 2018-05-22 DIAGNOSIS — E11.21 TYPE 2 DIABETES MELLITUS WITH DIABETIC NEPHROPATHY, WITH LONG-TERM CURRENT USE OF INSULIN (H): ICD-10-CM

## 2018-05-22 DIAGNOSIS — I10 BENIGN ESSENTIAL HYPERTENSION: ICD-10-CM

## 2018-05-22 DIAGNOSIS — R53.83 OTHER FATIGUE: Primary | ICD-10-CM

## 2018-05-22 DIAGNOSIS — N18.1 CKD (CHRONIC KIDNEY DISEASE) STAGE 1, GFR 90 ML/MIN OR GREATER: ICD-10-CM

## 2018-05-22 DIAGNOSIS — I10 ESSENTIAL HYPERTENSION: ICD-10-CM

## 2018-05-22 DIAGNOSIS — Z79.4 TYPE 2 DIABETES MELLITUS WITH DIABETIC NEPHROPATHY, WITH LONG-TERM CURRENT USE OF INSULIN (H): ICD-10-CM

## 2018-05-22 DIAGNOSIS — E78.00 PURE HYPERCHOLESTEROLEMIA: ICD-10-CM

## 2018-05-22 DIAGNOSIS — J20.9 ACUTE BRONCHITIS, UNSPECIFIED ORGANISM: ICD-10-CM

## 2018-05-22 LAB
ANION GAP SERPL CALCULATED.3IONS-SCNC: 9 MMOL/L (ref 3–14)
BASOPHILS # BLD AUTO: 0.1 10E9/L (ref 0–0.2)
BASOPHILS NFR BLD AUTO: 1.4 %
BUN SERPL-MCNC: 16 MG/DL (ref 7–30)
CALCIUM SERPL-MCNC: 8.8 MG/DL (ref 8.5–10.1)
CHLORIDE SERPL-SCNC: 99 MMOL/L (ref 94–109)
CO2 SERPL-SCNC: 23 MMOL/L (ref 20–32)
CREAT SERPL-MCNC: 1.09 MG/DL (ref 0.66–1.25)
DIFFERENTIAL METHOD BLD: ABNORMAL
EOSINOPHIL # BLD AUTO: 0.5 10E9/L (ref 0–0.7)
EOSINOPHIL NFR BLD AUTO: 10.1 %
ERYTHROCYTE [DISTWIDTH] IN BLOOD BY AUTOMATED COUNT: 12.6 % (ref 10–15)
ERYTHROCYTE [DISTWIDTH] IN BLOOD BY AUTOMATED COUNT: 12.6 % (ref 10–15)
FERRITIN SERPL-MCNC: 63 NG/ML (ref 26–388)
GFR SERPL CREATININE-BSD FRML MDRD: 66 ML/MIN/1.7M2
GLUCOSE SERPL-MCNC: 190 MG/DL (ref 70–99)
HCT VFR BLD AUTO: 29.8 % (ref 40–53)
HCT VFR BLD AUTO: 30.2 % (ref 40–53)
HGB BLD-MCNC: 10.7 G/DL (ref 13.3–17.7)
HGB BLD-MCNC: 10.7 G/DL (ref 13.3–17.7)
LYMPHOCYTES # BLD AUTO: 1.3 10E9/L (ref 0.8–5.3)
LYMPHOCYTES NFR BLD AUTO: 27 %
MCH RBC QN AUTO: 33.5 PG (ref 26.5–33)
MCH RBC QN AUTO: 33.6 PG (ref 26.5–33)
MCHC RBC AUTO-ENTMCNC: 35.4 G/DL (ref 31.5–36.5)
MCHC RBC AUTO-ENTMCNC: 35.9 G/DL (ref 31.5–36.5)
MCV RBC AUTO: 94 FL (ref 78–100)
MCV RBC AUTO: 95 FL (ref 78–100)
MONOCYTES # BLD AUTO: 0.6 10E9/L (ref 0–1.3)
MONOCYTES NFR BLD AUTO: 13 %
NEUTROPHILS # BLD AUTO: 2.4 10E9/L (ref 1.6–8.3)
NEUTROPHILS NFR BLD AUTO: 48.5 %
PLATELET # BLD AUTO: 157 10E9/L (ref 150–450)
PLATELET # BLD AUTO: 165 10E9/L (ref 150–450)
POTASSIUM SERPL-SCNC: 4.8 MMOL/L (ref 3.4–5.3)
RBC # BLD AUTO: 3.18 10E12/L (ref 4.4–5.9)
RBC # BLD AUTO: 3.19 10E12/L (ref 4.4–5.9)
RETICS # AUTO: 36 10E9/L (ref 25–95)
RETICS/RBC NFR AUTO: 1.1 % (ref 0.5–2)
SODIUM SERPL-SCNC: 131 MMOL/L (ref 133–144)
TSH SERPL DL<=0.005 MIU/L-ACNC: 1.46 MU/L (ref 0.4–4)
VIT B12 SERPL-MCNC: 385 PG/ML (ref 193–986)
WBC # BLD AUTO: 4.9 10E9/L (ref 4–11)
WBC # BLD AUTO: 4.9 10E9/L (ref 4–11)

## 2018-05-22 PROCEDURE — 84443 ASSAY THYROID STIM HORMONE: CPT | Performed by: FAMILY MEDICINE

## 2018-05-22 PROCEDURE — 82728 ASSAY OF FERRITIN: CPT | Performed by: FAMILY MEDICINE

## 2018-05-22 PROCEDURE — 85045 AUTOMATED RETICULOCYTE COUNT: CPT | Performed by: FAMILY MEDICINE

## 2018-05-22 PROCEDURE — 80048 BASIC METABOLIC PNL TOTAL CA: CPT | Performed by: FAMILY MEDICINE

## 2018-05-22 PROCEDURE — 82607 VITAMIN B-12: CPT | Performed by: FAMILY MEDICINE

## 2018-05-22 PROCEDURE — 99214 OFFICE O/P EST MOD 30 MIN: CPT | Mod: 25 | Performed by: FAMILY MEDICINE

## 2018-05-22 PROCEDURE — 85025 COMPLETE CBC W/AUTO DIFF WBC: CPT | Performed by: FAMILY MEDICINE

## 2018-05-22 PROCEDURE — 85060 BLOOD SMEAR INTERPRETATION: CPT | Performed by: FAMILY MEDICINE

## 2018-05-22 PROCEDURE — 82746 ASSAY OF FOLIC ACID SERUM: CPT | Performed by: FAMILY MEDICINE

## 2018-05-22 PROCEDURE — 99397 PER PM REEVAL EST PAT 65+ YR: CPT | Performed by: FAMILY MEDICINE

## 2018-05-22 PROCEDURE — 36415 COLL VENOUS BLD VENIPUNCTURE: CPT | Performed by: FAMILY MEDICINE

## 2018-05-22 RX ORDER — ATORVASTATIN CALCIUM 20 MG/1
20 TABLET, FILM COATED ORAL DAILY
Qty: 90 TABLET | Refills: 3 | Status: SHIPPED | OUTPATIENT
Start: 2018-05-22 | End: 2018-09-28

## 2018-05-22 RX ORDER — LORATADINE 10 MG/1
10 TABLET ORAL DAILY
COMMUNITY
End: 2018-11-14

## 2018-05-22 RX ORDER — GLIPIZIDE 5 MG/1
5 TABLET, FILM COATED, EXTENDED RELEASE ORAL DAILY
Qty: 90 TABLET | Refills: 3 | Status: SHIPPED | OUTPATIENT
Start: 2018-05-22 | End: 2018-09-28

## 2018-05-22 RX ORDER — ALBUTEROL SULFATE 90 UG/1
2 AEROSOL, METERED RESPIRATORY (INHALATION) EVERY 6 HOURS PRN
Qty: 1 INHALER | Refills: 0 | Status: SHIPPED | OUTPATIENT
Start: 2018-05-22 | End: 2019-12-11

## 2018-05-22 RX ORDER — AMLODIPINE BESYLATE 5 MG/1
10 TABLET ORAL DAILY
Qty: 180 TABLET | Refills: 3 | Status: SHIPPED | OUTPATIENT
Start: 2018-05-22 | End: 2018-09-28

## 2018-05-22 RX ORDER — METOPROLOL TARTRATE 25 MG/1
25 TABLET, FILM COATED ORAL 2 TIMES DAILY
Qty: 60 TABLET | Refills: 3 | Status: SHIPPED | OUTPATIENT
Start: 2018-05-22 | End: 2018-09-20

## 2018-05-22 RX ORDER — LISINOPRIL 40 MG/1
40 TABLET ORAL 2 TIMES DAILY
Qty: 180 TABLET | Refills: 3 | Status: SHIPPED | OUTPATIENT
Start: 2018-05-22 | End: 2018-09-28

## 2018-05-22 ASSESSMENT — ACTIVITIES OF DAILY LIVING (ADL)
CURRENT_FUNCTION: NO ASSISTANCE NEEDED
I_NEED_ASSISTANCE_FOR_THE_FOLLOWING_DAILY_ACTIVITIES:: NO ASSISTANCE IS NEEDED

## 2018-05-22 NOTE — MR AVS SNAPSHOT
After Visit Summary   5/22/2018    Sae Milligan    MRN: [id number]           Patient Information     Date Of Birth          1945        Visit Information        Provider Department      5/22/2018 9:20 AM Kelvin Keenan MD [facility]        Today's Diagnoses     Other fatigue    -  1    Acute bronchitis, unspecified organism        Benign essential hypertension        Pure hypercholesterolemia        Type 2 diabetes mellitus with diabetic nephropathy, with long-term current use of insulin (H)        CKD (chronic kidney disease) stage 1, GFR 90 ml/min or greater        Essential hypertension          Care Instructions          Preventive Health Recommendations:   Male Ages 65 and over    Yearly exam:             See your health care provider every year in order to  o   Review health changes.   o   Discuss preventive care.    o   Review your medicines if your doctor has prescribed any.    Talk with your health care provider about whether you should have a test to screen for prostate cancer (PSA).    Every 3 years, have a diabetes test (fasting glucose). If you are at risk for diabetes, you should have this test more often.    Every 5 years, have a cholesterol test. Have this test more often if you are at risk for high cholesterol or heart disease.     Every 10 years, have a colonoscopy. Or, have a yearly FIT test (stool test). These exams will check for colon cancer.    Talk to with your health care provider about screening for Abdominal Aortic Aneurysm if you have a family history of AAA or have a history of smoking.    Shots:     Get a flu shot each year.     Get a tetanus shot every 10 years.     Talk to your doctor about your pneumonia vaccines. There are now two you should receive - Pneumovax (PPSV 23) and Prevnar (PCV 13).     Talk to your doctor about a shingles vaccine.     Talk to your doctor about the hepatitis B vaccine.  Nutrition:     Eat at least 5  servings of fruits and vegetables each day.     Eat whole-grain bread, whole-wheat pasta and brown rice instead of white grains and rice.     Talk to your provider about Calcium and Vitamin D.   Lifestyle    Exercise for at least 150 minutes a week (30 minutes a day, 5 days a week). This will help you control your weight and prevent disease.     Limit alcohol to one drink per day.     No smoking.     Wear sunscreen to prevent skin cancer.     See your dentist every six months for an exam and cleaning.     See your eye doctor every 1 to 2 years to screen for conditions such as glaucoma, macular degeneration, cataracts, etc     1. Lets do some lab evaluation for the fatigue.  I will check thyroid, Na, and your iron.     2. Lets plan to reqroup after labs.   3. Same meds for now.           Follow-ups after your visit        Who to contact     If you have questions or need follow up information about today's clinic visit or your schedule please contact [facility] directly at [phone].  Normal or non-critical lab and imaging results will be communicated to you by [employer], letter or phone within 4 business days after the clinic has received the results. If you do not hear from us within 7 days, please contact the clinic through [employer] or phone. If you have a critical or abnormal lab result, we will notify you by phone as soon as possible.  Submit refill requests through [employer] or call your pharmacy and they will forward the refill request to us. Please allow 3 business days for your refill to be completed.          Additional Information About Your Visit        [employer] Information     [employer] gives you secure access to your electronic health record. If you see a primary care provider, you can also send messages to your care team and make appointments. If you have questions, please call your primary care clinic.  If you do not have a primary care provider, please call [phone] and they will  "assist you.        Care EveryWhere ID     This is your Care EveryWhere ID. This could be used by other organizations to access your [place] medical records  [id number]        Your Vitals Were     Pulse Temperature Respirations Height BMI (Body Mass Index)       53 97.7  F (36.5  C) (Tympanic) 16 5' 6\" (1.676 m) 30.28 kg/m2        Blood Pressure from Last 3 Encounters:   05/22/18 134/71   03/02/18 114/70   12/22/17 136/70    Weight from Last 3 Encounters:   05/22/18 187 lb 9.6 oz (85.1 kg)   03/02/18 181 lb (82.1 kg)   12/05/17 181 lb 12.8 oz (82.5 kg)              We Performed the Following     Basic metabolic panel     CBC with platelets     Ferritin     TSH with free T4 reflex          Today's Medication Changes          These changes are accurate as of 5/22/18  9:47 AM.  If you have any questions, ask your nurse or doctor.               These medicines have changed or have updated prescriptions.        Dose/Directions    lisinopril 40 MG tablet   Commonly known as:  PRINIVIL/ZESTRIL   This may have changed:  See the new instructions.   Used for:  CKD (chronic kidney disease) stage 1, GFR 90 ml/min or greater   Changed by:  Kelvin Keenan MD        Dose:  40 mg   Take 1 tablet (40 mg) by mouth 2 times daily   Quantity:  180 tablet   Refills:  3            Where to get your medicines      These medications were sent to [facility] - [facility] [address]  [address], [facility] [address]     Phone:  [phone]     albuterol 108 (90 Base) MCG/ACT Inhaler    amLODIPine 5 MG tablet    atorvastatin 20 MG tablet    glipiZIDE 5 MG 24 hr tablet    lisinopril 40 MG tablet    metFORMIN 500 MG tablet    metoprolol tartrate 25 MG tablet                Primary Care Provider Office Phone # Fax #    Kelvin Keenan [phone]       [address] [facility] [address]        Equal Access to Services     KACEY GARLAND AH: Hadii aad ku " misty Beverly, waradhamesda adamaadaha, qalarisata kabenji connell, lindsey anaya lanejoey kaushalkim laloluh yariel. So [facility] [phone].    ATENCIÓN: Si danila gino, tiene a wang disposición servicios gratuitos de asistencia lingüística. Vikas al [phone].    We comply with applicable federal civil rights laws and [place] laws. We do not discriminate on the basis of race, color, national origin, age, disability, sex, sexual orientation, or gender identity.            Thank you!     Thank you for choosing [facility]  for your care. Our goal is always to provide you with excellent care. Hearing back from our patients is one way we can continue to improve our services. Please take a few minutes to complete the written survey that you may receive in the mail after your visit with us. Thank you!             Your Updated Medication List - Protect others around you: Learn how to safely use, store and throw away your medicines at www.disposemymeds.org.          This list is accurate as of 5/22/18  9:47 AM.  Always use your most recent med list.                   Brand Name Dispense Instructions for use Diagnosis    * albuterol (2.5 MG/3ML) 0.083% neb solution     25 vial    Take 1 vial (2.5 mg) by nebulization every 6 hours as needed for shortness of breath / dyspnea or wheezing    Acute bronchitis with symptoms > 10 days       * albuterol 108 (90 Base) MCG/ACT Inhaler    PROAIR HFA/PROVENTIL HFA/VENTOLIN HFA    1 Inhaler    Inhale 2 puffs into the lungs every 6 hours as needed for shortness of breath / dyspnea or wheezing    Acute bronchitis, unspecified organism       ALLERGEN IMMUNOTHERAPY PRESCRIPTION     5 mL    Mix #3  Tree , [place], [place]  GLY1:20 w/v, HS  0.3ml Birch Mix GLY1:20 w/v, HS  0.3ml [place] GLY1:20 w/v, HS  0.3ml Elm, American GLY1:20 w/v, HS  0.3ml [place], Black GLY1:20 w/v 0.3ml [place] GLY 1:20 w/v, HS 0.3ml [place] Quarters GLY 1:20 w/v, ALK 0.3ml [place]-Povertyweed GLY 1:20  w/v, HS 0.3ml Nettle GLY 1:20 w/v, HS 0.3ml Ragweed, Short GLY 1:20 w/v HS 0.3ml Russian Thistle GLY 1:20 w/v, HS 0.3ml Diluent: HSAqs to 5ml    Chronic rhinitis       amLODIPine 5 MG tablet    NORVASC    180 tablet    Take 2 tablets (10 mg) by mouth daily    Benign essential hypertension       aspirin 81 MG tablet      Take 1 tablet by mouth daily.        atorvastatin 20 MG tablet    LIPITOR    90 tablet    Take 1 tablet (20 mg) by mouth daily    Pure hypercholesterolemia       blood glucose monitoring test strip    no brand specified    2 Box    1 strip by In Vitro route daily Test 1x daily. Accu-Chek Marlene test strips    Type 2 diabetes mellitus with diabetic nephropathy, with long-term current use of insulin (H)       calcium carb 1250 mg (500 mg [place])/vitamin D 200 unit 500-200 MG-UNIT per tablet    OSCAL with D     Take one tablet twice daily with food        glipiZIDE 5 MG 24 hr tablet    glipiZIDE XL    90 tablet    Take 1 tablet (5 mg) by mouth daily    Type 2 diabetes mellitus with diabetic nephropathy, with long-term current use of insulin (H)       lisinopril 40 MG tablet    PRINIVIL/ZESTRIL    180 tablet    Take 1 tablet (40 mg) by mouth 2 times daily    CKD (chronic kidney disease) stage 1, GFR 90 ml/min or greater       loratadine 10 MG tablet    CLARITIN     Take 10 mg by mouth daily        magnesium 500 MG Tabs      Take 500 mg by mouth daily        metFORMIN 500 MG tablet    GLUCOPHAGE    360 tablet    TAKE TWO TABLETS TWICE DAILY, WITH FOOD.    Type 2 diabetes mellitus with diabetic nephropathy, with long-term current use of insulin (H)       metoprolol tartrate 25 MG tablet    LOPRESSOR    60 tablet    Take 1 tablet (25 mg) by mouth 2 times daily    Essential hypertension       order for DME     1 Units    Nebulizer    Acute bronchitis with symptoms > 10 days       Potassium Gluconate 595 MG Caps      Take 595 mg by mouth daily        Selenium 200 MCG Tabs      Take 200 mcg by mouth daily         tamsulosin 0.4 MG capsule    FLOMAX     Take 0.4 mg by mouth daily        VITAMIN B COMPLEX PO      Take 1 tablet by mouth daily.        * Notice:  This list has 2 medication(s) that are the same as other medications prescribed for you. Read the directions carefully, and ask your doctor or other care provider to review them with you.

## 2018-05-22 NOTE — NURSING NOTE
"Chief Complaint   Patient presents with     Physical       Initial /71 (BP Location: Right arm, Patient Position: Chair, Cuff Size: Adult Regular)  Pulse 53  Temp 97.7  F (36.5  C) (Tympanic)  Resp 16  Ht 5' 6\" (1.676 m)  Wt 187 lb 9.6 oz (85.1 kg)  BMI 30.28 kg/m2 Estimated body mass index is 30.28 kg/(m^2) as calculated from the following:    Height as of this encounter: 5' 6\" (1.676 m).    Weight as of this encounter: 187 lb 9.6 oz (85.1 kg).      Health Maintenance that is potentially due pending provider review:  NONE        Is there anyone who you would like to be able to receive your results? No  If yes have patient fill out LORNE      "

## 2018-05-22 NOTE — PROGRESS NOTES
SUBJECTIVE:   Sae Milligan is a 72 year old male who presents for Preventive Visit.    He does have some fatigue and has a history of low hb and depressed Na.  Also questions his thyroid.    Are you in the first 12 months of your Medicare coverage?  No    Physical   Annual:     Getting at least 3 servings of Calcium per day::  Yes    Bi-annual eye exam::  Yes    Dental care twice a year::  Yes    Sleep apnea or symptoms of sleep apnea::  Daytime drowsiness    Diet::  Diabetic    Frequency of exercise::  None    Taking medications regularly::  Yes    Medication side effects::  None    Additional concerns today::  No    Ability to successfully perform activities of daily living: no assistance needed  Home Safety:  No safety concerns identified  Hearing Impairment: feel that people are mumbling or not speaking clearly, need to ask people to speak up or repeat themselves and find that men's voices are easier to understand than woman's        Fall risk:  Fallen 2 or more times in the past year?: No  Any fall with injury in the past year?: No    COGNITIVE SCREEN  1) Repeat 3 items (Banana, Sunrise, Chair)    2) Clock draw: ABNORMAL time incorrect  3) 3 item recall: Recalls 2 objects   Results: ABNORMAL clock, 1-2 items recalled: PROBABLE COGNITIVE IMPAIRMENT, **INFORM PROVIDER**    Mini-CogTM Copyright ARIANA Bob. Licensed by the author for use in [facility]; reprinted with permission ([email or web address]). All rights reserved.        Reviewed and updated as needed this visit by clinical staff         Reviewed and updated as needed this visit by Provider        Social History   Substance Use Topics     Smoking status: Former Smoker     Quit date: 3/14/1990     Smokeless tobacco: Never Used     Alcohol use Yes      Comment: occasional       Alcohol Use 5/22/2018   If you drink alcohol do you typically have greater than 3 drinks per day OR greater than 7 drinks per week? No               Today's PHQ-2 Score:   PHQ-2  ( 1999 [employer]) 5/22/2018   Q1: Little interest or pleasure in doing things 1   Q2: Feeling down, depressed or hopeless 0   PHQ-2 Score 1   Q1: Little interest or pleasure in doing things Several days   Q2: Feeling down, depressed or hopeless Not at all   PHQ-2 Score 1       Do you feel safe in your environment - Yes    Do you have a Health Care Directive?: No: Advance care planning reviewed with patient; information given to patient to review.    Current providers sharing in care for this patient include:   Patient Care Team:  Kelvin Keenan MD as PCP - General (Family Practice)    The following health maintenance items are reviewed in Epic and correct as of today:  Health Maintenance   Topic Date Due     AORTIC ANEURYSM SCREENING (SYSTEM ASSIGNED)  11/09/2010     FOOT EXAM Q1 YEAR  10/13/2016     ADVANCE DIRECTIVE PLANNING Q5 YRS  05/15/2017     EYE EXAM Q1 YEAR  10/31/2017     TETANUS IMMUNIZATION (SYSTEM ASSIGNED)  12/04/2017     ASTHMA CONTROL TEST Q6 MOS  05/21/2018     ASTHMA ACTION PLAN Q1 YR  05/26/2018     FALL RISK ASSESSMENT  09/19/2018     TSH W/ FREE T4 REFLEX Q2 YEAR  09/29/2018     A1C Q6 MO  10/24/2018     LIPID MONITORING Q1 YEAR  11/20/2018     MICROALBUMIN Q1 YEAR  11/20/2018     CREATININE Q1 YEAR  04/24/2019     COLONOSCOPY Q10 YR  03/02/2028     PNEUMOCOCCAL  Completed     INFLUENZA VACCINE  Completed     HEPATITIS C SCREENING  Completed     BP Readings from Last 3 Encounters:   05/22/18 134/71   03/02/18 114/70   12/22/17 136/70    Wt Readings from Last 3 Encounters:   05/22/18 187 lb 9.6 oz (85.1 kg)   03/02/18 181 lb (82.1 kg)   12/05/17 181 lb 12.8 oz (82.5 kg)                  Patient Active Problem List   Diagnosis     Type 2 diabetes mellitus with diabetic nephropathy (H)     Hyperlipidemia LDL goal <100     Other chronic allergic conjunctivitis     DJD (degenerative joint disease)     Health Care Home     Allergic rhinitis     CKD (chronic kidney disease) stage 1, GFR 90  ml/min or greater     Squamous cell carcinoma     History of squamous cell carcinoma     SK (seborrheic keratosis)     Lentigo     Family history of prostate cancer     Restless leg syndrome     Anxiety     Past Surgical History:   Procedure Laterality Date     ARTHROPLASTY KNEE  2/10/2011    ARTHROPLASTY KNEE performed by DARLING FARRELL at [facility]     COLONOSCOPY N/A 3/2/2018    Procedure: COLONOSCOPY;  colonoscopy;  Surgeon: Aris Whittaker MD;  Location: [facility]     MOHS MICROGRAPHIC PROCEDURE  july 2012    Sq. cell left helix     ORTHOPEDIC SURGERY       SURGICAL HISTORY OF -   12/2003    Cervical spine fx       Social History   Substance Use Topics     Smoking status: Former Smoker     Quit date: 3/14/1990     Smokeless tobacco: Never Used     Alcohol use Yes      Comment: occasional     Family History   Problem Relation Age of Onset     Hypertension Mother      CEREBROVASCULAR DISEASE Mother      Hypertension Father      CEREBROVASCULAR DISEASE Father      DIABETES Brother      Prostate Cancer Brother      Prostate Cancer Brother          Current Outpatient Prescriptions   Medication Sig Dispense Refill     albuterol (2.5 MG/3ML) 0.083% neb solution Take 1 vial (2.5 mg) by nebulization every 6 hours as needed for shortness of breath / dyspnea or wheezing 25 vial 0     albuterol (PROAIR HFA/PROVENTIL HFA/VENTOLIN HFA) 108 (90 Base) MCG/ACT Inhaler Inhale 2 puffs into the lungs every 6 hours as needed for shortness of breath / dyspnea or wheezing 1 Inhaler 0     amLODIPine (NORVASC) 5 MG tablet Take 2 tablets (10 mg) by mouth daily 180 tablet 3     aspirin 81 MG tablet Take 1 tablet by mouth daily.       atorvastatin (LIPITOR) 20 MG tablet Take 1 tablet (20 mg) by mouth daily 90 tablet 3     B Complex Vitamins (VITAMIN B COMPLEX PO) Take 1 tablet by mouth daily.       blood glucose monitoring (NO BRAND SPECIFIED) test strip 1 strip by In Vitro route daily Test 1x daily. Accu-Chek Marlene test strips 2 Box 3      CALCIUM CARBONATE-VITAMIN D 500-200 MG-UNIT OR TABS Take one tablet twice daily with food       glipiZIDE (GLIPIZIDE XL) 5 MG 24 hr tablet Take 1 tablet (5 mg) by mouth daily 90 tablet 3     lisinopril (PRINIVIL/ZESTRIL) 40 MG tablet Take 1 tablet (40 mg) by mouth 2 times daily 180 tablet 3     loratadine (CLARITIN) 10 MG tablet Take 10 mg by mouth daily       magnesium 500 MG TABS Take 500 mg by mouth daily       metFORMIN (GLUCOPHAGE) 500 MG tablet TAKE TWO TABLETS TWICE DAILY, WITH FOOD. 360 tablet 3     metoprolol tartrate (LOPRESSOR) 25 MG tablet Take 1 tablet (25 mg) by mouth 2 times daily 60 tablet 3     ORDER FOR ALLERGEN IMMUNOTHERAPY Mix #3  Tree , [place]  [place]t, White  GLY1:20 w/v, HS  0.3ml  Birch Mix GLY1:20 w/v, HS  0.3ml  [place] GLY1:20 w/v, HS  0.3ml  Elm, American GLY1:20 w/v, HS  0.3ml  [place], Black GLY1:20 w/v 0.3ml  [place] GLY 1:20 w/v, HS 0.3ml  Lamb's Quarters GLY 1:20 w/v, ALK 0.3ml  Marshelder-Povertyweed GLY 1:20 w/v, HS 0.3ml  Nettle GLY 1:20 w/v, HS 0.3ml  Ragweed, Short GLY 1:20 w/v HS 0.3ml  Russian Thistle GLY 1:20 w/v, HS 0.3ml  Diluent: HSAqs to 5ml 5 mL PRN     order for DME Nebulizer 1 Units 0     Potassium Gluconate 595 MG CAPS Take 595 mg by mouth daily       Selenium 200 MCG TABS Take 200 mcg by mouth daily       tamsulosin (FLOMAX) 0.4 MG capsule Take 0.4 mg by mouth daily       [DISCONTINUED] albuterol (PROAIR HFA/PROVENTIL HFA/VENTOLIN HFA) 108 (90 BASE) MCG/ACT Inhaler Inhale 2 puffs into the lungs every 6 hours as needed for shortness of breath / dyspnea or wheezing 1 Inhaler 0     [DISCONTINUED] amLODIPine (NORVASC) 5 MG tablet Take 2 tablets (10 mg) by mouth daily 180 tablet 1     [DISCONTINUED] atorvastatin (LIPITOR) 20 MG tablet Take 1 tablet (20 mg) by mouth daily 90 tablet 3     [DISCONTINUED] glipiZIDE (GLIPIZIDE XL) 5 MG 24 hr tablet Take 1 tablet (5 mg) by mouth daily 90 tablet 3     [DISCONTINUED] lisinopril (PRINIVIL/ZESTRIL) 40 MG tablet TAKE 1 TABLET TWICE  "DAILY 180 tablet 1     [DISCONTINUED] metFORMIN (GLUCOPHAGE) 500 MG tablet TAKE TWO TABLETS TWICE DAILY, WITH FOOD. 360 tablet 3     [DISCONTINUED] metoprolol tartrate (LOPRESSOR) 25 MG tablet Take 1 tablet (25 mg) by mouth 2 times daily 60 tablet 0     Recent Labs   Lab Test  04/24/18   0846  12/21/17   0751  11/20/17   0927   04/03/17   0919  09/29/16   0814   10/06/15   0743   06/11/14   0740   11/06/13   0832   04/25/13   0937   01/10/12   0925   A1C  6.2*   --   5.8   --   5.8  5.4   < >  6.1*   < >  5.8   --   6.1*   --   6.4*   < >  6.1*   LDL   --    --   20   --    --   30   --   50   --   71   --   91   < >  112   < >  92   HDL   --    --   63   --    --   68   --   53   --   50   --   47   < >  55   < >  50   TRIG   --    --   102   --    --   57   --   67   --   47   --   80   < >  99   < >  75   ALT   --    --    --    --    --    --    --    --    --    --    --   41   --   39   --   31   CR  1.14  1.25  1.12   < >   --    --    < >  0.88   < >  0.92   < >  0.89   --   0.89   < >  0.86   GFRESTIMATED  63  57*  64   < >   --    --    < >  85   < >  82   < >  85   --   85   < >  89   GFRESTBLACK  76  69  78   < >   --    --    < >  >90   GFR Calc     < >  >90   < >  >90   --   >90   < >  >90   POTASSIUM  4.9  4.4  4.7   < >   --    --    < >  5.0   < >  5.5*   < >  5.3   --   4.9   < >  4.9   TSH   --    --    --    --    --   2.82   --    --    --   1.61   --    --    --    --    --   1.41    < > = values in this interval not displayed.            Review of Systems  CONSTITUTIONAL: NEGATIVE for fever, chills, change in weight  ENT/MOUTH: NEGATIVE for ear, mouth and throat problems  RESP: NEGATIVE for significant cough or SOB  CV: NEGATIVE for chest pain, palpitations or peripheral edema    OBJECTIVE:   There were no vitals taken for this visit. Estimated body mass index is 29.21 kg/(m^2) as calculated from the following:    Height as of 3/2/18: 5' 6\" (1.676 m).    Weight as of 3/2/18: " 181 lb (82.1 kg).  Physical Exam  GENERAL: healthy, alert and no distress  NECK: no adenopathy, no asymmetry, masses, or scars and thyroid normal to palpation  RESP: lungs clear to auscultation - no rales, rhonchi or wheezes  CV: regular rate and rhythm, normal S1 S2, no S3 or S4, no murmur, click or rub, no peripheral edema and peripheral pulses strong  ABDOMEN: soft, nontender, no hepatosplenomegaly, no masses and bowel sounds normal  MS: no gross musculoskeletal defects noted, no edema    ASSESSMENT / PLAN:   1. Other fatigue  Will work up   - Basic metabolic panel  - CBC with platelets  - Ferritin  - TSH with free T4 reflex    2. Acute bronchitis, unspecified organism  Clear   - albuterol (PROAIR HFA/PROVENTIL HFA/VENTOLIN HFA) 108 (90 Base) MCG/ACT Inhaler; Inhale 2 puffs into the lungs every 6 hours as needed for shortness of breath / dyspnea or wheezing  Dispense: 1 Inhaler; Refill: 0    3. Benign essential hypertension  Good control   - amLODIPine (NORVASC) 5 MG tablet; Take 2 tablets (10 mg) by mouth daily  Dispense: 180 tablet; Refill: 3    4. Pure hypercholesterolemia    - atorvastatin (LIPITOR) 20 MG tablet; Take 1 tablet (20 mg) by mouth daily  Dispense: 90 tablet; Refill: 3    5. Type 2 diabetes mellitus with diabetic nephropathy, with long-term current use of insulin (H)    - glipiZIDE (GLIPIZIDE XL) 5 MG 24 hr tablet; Take 1 tablet (5 mg) by mouth daily  Dispense: 90 tablet; Refill: 3  - metFORMIN (GLUCOPHAGE) 500 MG tablet; TAKE TWO TABLETS TWICE DAILY, WITH FOOD.  Dispense: 360 tablet; Refill: 3    6. CKD (chronic kidney disease) stage 1, GFR 90 ml/min or greater    - lisinopril (PRINIVIL/ZESTRIL) 40 MG tablet; Take 1 tablet (40 mg) by mouth 2 times daily  Dispense: 180 tablet; Refill: 3    7. Essential hypertension    - metoprolol tartrate (LOPRESSOR) 25 MG tablet; Take 1 tablet (25 mg) by mouth 2 times daily  Dispense: 60 tablet; Refill: 3    End of Life Planning:  Patient currently has an  "advanced directive:     COUNSELING:          Estimated body mass index is 29.21 kg/(m^2) as calculated from the following:    Height as of 3/2/18: 5' 6\" (1.676 m).    Weight as of 3/2/18: 181 lb (82.1 kg).     reports that he quit smoking about 28 years ago. He has never used smokeless tobacco.      Appropriate preventive services were discussed with this patient, including applicable screening as appropriate for cardiovascular disease, diabetes, osteopenia/osteoporosis, and glaucoma.  As appropriate for age/gender, discussed screening for colorectal cancer, prostate cancer, breast cancer, and cervical cancer. Checklist reviewing preventive services available has been given to the patient.    Reviewed patients plan of care and provided an AVS. The  maninder Quevedo meets the Care Plan requirement. This Care Plan has been established and reviewed with the     Counseling Resources:  ATP IV Guidelines  Pooled Cohorts Equation Calculator  Breast Cancer Risk Calculator  FRAX Risk Assessment  ICSI Preventive Guidelines  Dietary Guidelines for Americans, 2010  USDA's MyPlate  ASA Prophylaxis  Lung CA Screening    Kelvin Keenan MD  [facility]  "

## 2018-05-22 NOTE — PATIENT INSTRUCTIONS
Preventive Health Recommendations:   Male Ages 65 and over    Yearly exam:             See your health care provider every year in order to  o   Review health changes.   o   Discuss preventive care.    o   Review your medicines if your doctor has prescribed any.    Talk with your health care provider about whether you should have a test to screen for prostate cancer (PSA).    Every 3 years, have a diabetes test (fasting glucose). If you are at risk for diabetes, you should have this test more often.    Every 5 years, have a cholesterol test. Have this test more often if you are at risk for high cholesterol or heart disease.     Every 10 years, have a colonoscopy. Or, have a yearly FIT test (stool test). These exams will check for colon cancer.    Talk to with your health care provider about screening for Abdominal Aortic Aneurysm if you have a family history of AAA or have a history of smoking.    Shots:     Get a flu shot each year.     Get a tetanus shot every 10 years.     Talk to your doctor about your pneumonia vaccines. There are now two you should receive - Pneumovax (PPSV 23) and Prevnar (PCV 13).     Talk to your doctor about a shingles vaccine.     Talk to your doctor about the hepatitis B vaccine.  Nutrition:     Eat at least 5 servings of fruits and vegetables each day.     Eat whole-grain bread, whole-wheat pasta and brown rice instead of white grains and rice.     Talk to your provider about Calcium and Vitamin D.   Lifestyle    Exercise for at least 150 minutes a week (30 minutes a day, 5 days a week). This will help you control your weight and prevent disease.     Limit alcohol to one drink per day.     No smoking.     Wear sunscreen to prevent skin cancer.     See your dentist every six months for an exam and cleaning.     See your eye doctor every 1 to 2 years to screen for conditions such as glaucoma, macular degeneration, cataracts, etc     1. Lets do some lab evaluation for the fatigue.  I  will check thyroid, Na, and your iron.     2. Lets plan to reqroup after labs.   3. Same meds for now.

## 2018-05-23 LAB
COPATH REPORT: NORMAL
FOLATE SERPL-MCNC: 92.2 NG/ML

## 2018-05-25 ENCOUNTER — TELEPHONE (OUTPATIENT)
Dept: FAMILY MEDICINE | Facility: CLINIC | Age: 73
End: 2018-05-25

## 2018-05-25 NOTE — TELEPHONE ENCOUNTER
Calling looking for lab results from 05.22.2018 second set and what is the plan?    Ester Rodrigues CSS

## 2018-05-25 NOTE — TELEPHONE ENCOUNTER
Pt called. Advised Dr Castillo is out of the office until Wednesday. Pt is ok to wait for results and plan until then. Camila Mendenhall RN

## 2018-05-30 NOTE — TELEPHONE ENCOUNTER
Please inform him that his recent labs are all normal and his Hb is about what it has been for some time.  I think we could simply watch this over the summer and recheck in Sept.  Another option would be to do a bone marrow biopsy but I would feel fine about observation over the summer.  Kelvin Keenan

## 2018-08-07 ENCOUNTER — DOCUMENTATION ONLY (OUTPATIENT)
Dept: LAB | Facility: CLINIC | Age: 73
End: 2018-08-07

## 2018-08-07 DIAGNOSIS — R79.89 ABNORMAL COMPLETE BLOOD COUNT: Primary | ICD-10-CM

## 2018-08-07 NOTE — PROGRESS NOTES
Patient is coming for labs for Dr Keenan.  There is nothing due in .  Please place orders or call patient if nothing is needed at this time.  THanks

## 2018-08-08 DIAGNOSIS — R79.89 ABNORMAL COMPLETE BLOOD COUNT: ICD-10-CM

## 2018-08-08 LAB
ERYTHROCYTE [DISTWIDTH] IN BLOOD BY AUTOMATED COUNT: 11.9 % (ref 10–15)
HCT VFR BLD AUTO: 30.9 % (ref 40–53)
HGB BLD-MCNC: 10.8 G/DL (ref 13.3–17.7)
MCH RBC QN AUTO: 33.4 PG (ref 26.5–33)
MCHC RBC AUTO-ENTMCNC: 35 G/DL (ref 31.5–36.5)
MCV RBC AUTO: 96 FL (ref 78–100)
PLATELET # BLD AUTO: 162 10E9/L (ref 150–450)
RBC # BLD AUTO: 3.23 10E12/L (ref 4.4–5.9)
WBC # BLD AUTO: 4.8 10E9/L (ref 4–11)

## 2018-08-08 PROCEDURE — 36415 COLL VENOUS BLD VENIPUNCTURE: CPT | Performed by: FAMILY MEDICINE

## 2018-08-08 PROCEDURE — 85027 COMPLETE CBC AUTOMATED: CPT | Performed by: FAMILY MEDICINE

## 2018-09-20 DIAGNOSIS — I10 ESSENTIAL HYPERTENSION: ICD-10-CM

## 2018-09-20 RX ORDER — METOPROLOL TARTRATE 25 MG/1
25 TABLET, FILM COATED ORAL 2 TIMES DAILY
Qty: 60 TABLET | Refills: 0 | Status: SHIPPED | OUTPATIENT
Start: 2018-09-20 | End: 2018-09-27

## 2018-09-20 RX ORDER — METOPROLOL TARTRATE 25 MG/1
TABLET, FILM COATED ORAL
Qty: 180 TABLET | Refills: 3 | OUTPATIENT
Start: 2018-09-20

## 2018-09-20 NOTE — TELEPHONE ENCOUNTER
"Pt requests 10 days only of his Metoprolol. He talked to his mail order and it will take 10 days to come so needs to  some at [facility].    Requested Prescriptions   Pending Prescriptions Disp Refills     metoprolol tartrate (LOPRESSOR) 25 MG tablet 20 tablet      Sig: Take 1 tablet (25 mg) by mouth 2 times daily    Beta-Blockers Protocol Passed    9/20/2018  7:58 AM       Passed - Blood pressure under 140/90 in past 12 months    BP Readings from Last 3 Encounters:   05/22/18 134/71   03/02/18 114/70   12/22/17 136/70                Passed - Patient is age 6 or older       Passed - Recent (12 mo) or future (30 days) visit within the authorizing provider's specialty    Patient had office visit in the last 12 months or has a visit in the next 30 days with authorizing provider or within the authorizing provider's specialty.  See \"Patient Info\" tab in inbasket, or \"Choose Columns\" in Meds & Orders section of the refill encounter.            Last Written Prescription Date:  5/22/18  Last Fill Quantity: 60,  # refills: 3   Last office visit: 5/22/2018 with prescribing provider:  Ree   Future Office Visit:      "

## 2018-09-26 ENCOUNTER — NURSE TRIAGE (OUTPATIENT)
Dept: NURSING | Facility: CLINIC | Age: 73
End: 2018-09-26

## 2018-09-26 NOTE — TELEPHONE ENCOUNTER
Caller requesting information regarding arefill of metoprolol    See telephone encounter   Amita Ruiz RN  FNA    Reason for Disposition    Caller requesting a NON-URGENT new prescription or refill and triager unable to refill per unit policy    Protocols used: MEDICATION QUESTION CALL-ADULT-    
(0) independent

## 2018-09-26 NOTE — TELEPHONE ENCOUNTER
Clinic Action Needed:Yes; call patient  Reason for Call: refill denial of metoprolol  Patient Recommendations/Teaching:Referred to clinic - within 24 hours  Teaching per [facility] Care guidelines.  Routed to: clinic staff  Caller states he received word from [employer] that his  metoprolol  refill request was denied; Caller does not understand why   Review eof EMR reveals that PCP has only refilled the bridge  That was sent  4 days ago   Unable to determine history of ordering this from his EMR   Please review and contact patient  Today 09/27/18 @   [phone]    Thank you   Amita Ruiz RN  FNA

## 2018-09-27 ENCOUNTER — TELEPHONE (OUTPATIENT)
Dept: FAMILY MEDICINE | Facility: CLINIC | Age: 73
End: 2018-09-27

## 2018-09-27 ENCOUNTER — DOCUMENTATION ONLY (OUTPATIENT)
Dept: LAB | Facility: CLINIC | Age: 73
End: 2018-09-27

## 2018-09-27 DIAGNOSIS — I10 ESSENTIAL HYPERTENSION: ICD-10-CM

## 2018-09-27 RX ORDER — METOPROLOL TARTRATE 25 MG/1
25 TABLET, FILM COATED ORAL 2 TIMES DAILY
Qty: 20 TABLET | Refills: 0 | Status: SHIPPED | OUTPATIENT
Start: 2018-09-27 | End: 2019-01-02

## 2018-09-27 RX ORDER — METOPROLOL TARTRATE 25 MG/1
25 TABLET, FILM COATED ORAL 2 TIMES DAILY
Qty: 180 TABLET | Refills: 1 | Status: SHIPPED | OUTPATIENT
Start: 2018-09-27 | End: 2018-09-28

## 2018-09-27 NOTE — TELEPHONE ENCOUNTER
Sae has apt tomorrow for diabetic check with Dr Keenan and he wonders if he would want him to come in today for any lab tests so he would have them back tomorrow.  Please let him know.

## 2018-09-27 NOTE — TELEPHONE ENCOUNTER
Pt is requesting another 10 day supply of his Metoprolol to [facility].  Denisa told them his script from the Clinic was denied. RN called in script to mail order today.  Pt said he only has 2 days left of his 10 day supply.   Carmen Orn Station Sec

## 2018-09-27 NOTE — PROGRESS NOTES
Patient is coming for labs, please sign pended orders due per HM and place any additional needed.  Thanks!

## 2018-09-28 ENCOUNTER — OFFICE VISIT (OUTPATIENT)
Dept: FAMILY MEDICINE | Facility: CLINIC | Age: 73
End: 2018-09-28
Payer: COMMERCIAL

## 2018-09-28 VITALS
OXYGEN SATURATION: 98 % | WEIGHT: 184 LBS | SYSTOLIC BLOOD PRESSURE: 136 MMHG | DIASTOLIC BLOOD PRESSURE: 64 MMHG | HEIGHT: 66 IN | BODY MASS INDEX: 29.57 KG/M2 | HEART RATE: 53 BPM

## 2018-09-28 DIAGNOSIS — D50.8 OTHER IRON DEFICIENCY ANEMIA: ICD-10-CM

## 2018-09-28 DIAGNOSIS — Z79.4 TYPE 2 DIABETES MELLITUS WITH DIABETIC NEPHROPATHY, WITH LONG-TERM CURRENT USE OF INSULIN (H): ICD-10-CM

## 2018-09-28 DIAGNOSIS — E78.00 PURE HYPERCHOLESTEROLEMIA: ICD-10-CM

## 2018-09-28 DIAGNOSIS — N40.1 BENIGN PROSTATIC HYPERPLASIA WITH NOCTURIA: ICD-10-CM

## 2018-09-28 DIAGNOSIS — Z23 NEED FOR PROPHYLACTIC VACCINATION AND INOCULATION AGAINST INFLUENZA: Primary | ICD-10-CM

## 2018-09-28 DIAGNOSIS — Z23 NEED FOR VACCINATION: ICD-10-CM

## 2018-09-28 DIAGNOSIS — I10 ESSENTIAL HYPERTENSION: ICD-10-CM

## 2018-09-28 DIAGNOSIS — E11.21 TYPE 2 DIABETES MELLITUS WITH DIABETIC NEPHROPATHY, WITH LONG-TERM CURRENT USE OF INSULIN (H): ICD-10-CM

## 2018-09-28 DIAGNOSIS — I10 BENIGN ESSENTIAL HYPERTENSION: ICD-10-CM

## 2018-09-28 DIAGNOSIS — N18.1 CKD (CHRONIC KIDNEY DISEASE) STAGE 1, GFR 90 ML/MIN OR GREATER: ICD-10-CM

## 2018-09-28 DIAGNOSIS — R35.1 BENIGN PROSTATIC HYPERPLASIA WITH NOCTURIA: ICD-10-CM

## 2018-09-28 LAB
ALBUMIN SERPL-MCNC: 4 G/DL (ref 3.4–5)
ALP SERPL-CCNC: 54 U/L (ref 40–150)
ALT SERPL W P-5'-P-CCNC: 25 U/L (ref 0–70)
ANION GAP SERPL CALCULATED.3IONS-SCNC: 4 MMOL/L (ref 3–14)
AST SERPL W P-5'-P-CCNC: 15 U/L (ref 0–45)
BILIRUB SERPL-MCNC: 0.7 MG/DL (ref 0.2–1.3)
BUN SERPL-MCNC: 17 MG/DL (ref 7–30)
CALCIUM SERPL-MCNC: 9.4 MG/DL (ref 8.5–10.1)
CHLORIDE SERPL-SCNC: 98 MMOL/L (ref 94–109)
CO2 SERPL-SCNC: 29 MMOL/L (ref 20–32)
CREAT SERPL-MCNC: 1.22 MG/DL (ref 0.66–1.25)
ERYTHROCYTE [DISTWIDTH] IN BLOOD BY AUTOMATED COUNT: 12.3 % (ref 10–15)
GFR SERPL CREATININE-BSD FRML MDRD: 58 ML/MIN/1.7M2
GLUCOSE SERPL-MCNC: 156 MG/DL (ref 70–99)
HBA1C MFR BLD: 5.6 % (ref 0–5.6)
HCT VFR BLD AUTO: 33 % (ref 40–53)
HGB BLD-MCNC: 11.7 G/DL (ref 13.3–17.7)
MCH RBC QN AUTO: 33.9 PG (ref 26.5–33)
MCHC RBC AUTO-ENTMCNC: 35.5 G/DL (ref 31.5–36.5)
MCV RBC AUTO: 96 FL (ref 78–100)
PLATELET # BLD AUTO: 162 10E9/L (ref 150–450)
POTASSIUM SERPL-SCNC: 4.8 MMOL/L (ref 3.4–5.3)
PROT SERPL-MCNC: 7.7 G/DL (ref 6.8–8.8)
RBC # BLD AUTO: 3.45 10E12/L (ref 4.4–5.9)
SODIUM SERPL-SCNC: 131 MMOL/L (ref 133–144)
WBC # BLD AUTO: 5.2 10E9/L (ref 4–11)

## 2018-09-28 PROCEDURE — 80053 COMPREHEN METABOLIC PANEL: CPT | Performed by: FAMILY MEDICINE

## 2018-09-28 PROCEDURE — 90471 IMMUNIZATION ADMIN: CPT | Performed by: FAMILY MEDICINE

## 2018-09-28 PROCEDURE — 99214 OFFICE O/P EST MOD 30 MIN: CPT | Mod: 25 | Performed by: FAMILY MEDICINE

## 2018-09-28 PROCEDURE — 36415 COLL VENOUS BLD VENIPUNCTURE: CPT | Performed by: FAMILY MEDICINE

## 2018-09-28 PROCEDURE — 83036 HEMOGLOBIN GLYCOSYLATED A1C: CPT | Performed by: FAMILY MEDICINE

## 2018-09-28 PROCEDURE — G0008 ADMIN INFLUENZA VIRUS VAC: HCPCS | Mod: 59 | Performed by: FAMILY MEDICINE

## 2018-09-28 PROCEDURE — 90662 IIV NO PRSV INCREASED AG IM: CPT | Mod: GY | Performed by: FAMILY MEDICINE

## 2018-09-28 PROCEDURE — 90715 TDAP VACCINE 7 YRS/> IM: CPT | Performed by: FAMILY MEDICINE

## 2018-09-28 PROCEDURE — 85027 COMPLETE CBC AUTOMATED: CPT | Performed by: FAMILY MEDICINE

## 2018-09-28 RX ORDER — AMLODIPINE BESYLATE 5 MG/1
10 TABLET ORAL DAILY
Qty: 180 TABLET | Refills: 3 | Status: SHIPPED | OUTPATIENT
Start: 2018-09-28 | End: 2019-06-03

## 2018-09-28 RX ORDER — METOPROLOL TARTRATE 25 MG/1
25 TABLET, FILM COATED ORAL 2 TIMES DAILY
Qty: 180 TABLET | Refills: 1 | Status: SHIPPED | OUTPATIENT
Start: 2018-09-28 | End: 2019-03-13

## 2018-09-28 RX ORDER — ATORVASTATIN CALCIUM 20 MG/1
20 TABLET, FILM COATED ORAL DAILY
Qty: 90 TABLET | Refills: 3 | Status: SHIPPED | OUTPATIENT
Start: 2018-09-28 | End: 2019-06-03

## 2018-09-28 RX ORDER — GLIPIZIDE 5 MG/1
5 TABLET, FILM COATED, EXTENDED RELEASE ORAL DAILY
Qty: 90 TABLET | Refills: 3 | Status: SHIPPED | OUTPATIENT
Start: 2018-09-28 | End: 2019-01-14

## 2018-09-28 RX ORDER — TAMSULOSIN HYDROCHLORIDE 0.4 MG/1
0.4 CAPSULE ORAL DAILY
Qty: 60 CAPSULE | Refills: 3 | Status: SHIPPED | OUTPATIENT
Start: 2018-09-28 | End: 2019-03-13

## 2018-09-28 RX ORDER — LISINOPRIL 40 MG/1
40 TABLET ORAL 2 TIMES DAILY
Qty: 180 TABLET | Refills: 3 | Status: SHIPPED | OUTPATIENT
Start: 2018-09-28 | End: 2019-03-06

## 2018-09-28 NOTE — PATIENT INSTRUCTIONS
1. If you want sleeping aid call me.     2. I will on labs    3. Your exam is good    4. We are watching the sodium and the hb level.  Stay with the current     5. Come back 6 months.

## 2018-09-28 NOTE — NURSING NOTE
"Initial /64 (BP Location: Right arm, Patient Position: Chair, Cuff Size: Adult Large)  Pulse 53  Ht 5' 6\" (1.676 m)  Wt 184 lb (83.5 kg)  SpO2 98%  BMI 29.7 kg/m2 Estimated body mass index is 29.7 kg/(m^2) as calculated from the following:    Height as of this encounter: 5' 6\" (1.676 m).    Weight as of this encounter: 184 lb (83.5 kg). .    Janelle Munson    "

## 2018-09-28 NOTE — MR AVS SNAPSHOT
After Visit Summary   9/28/2018    Sae Milligan    MRN: [id number]           Patient Information     Date Of Birth          1945        Visit Information        Provider Department      9/28/2018 9:20 AM Kelvin Keenan MD [facility]        Today's Diagnoses     Need for prophylactic vaccination and inoculation against influenza    -  1    Benign essential hypertension        Pure hypercholesterolemia        Type 2 diabetes mellitus with diabetic nephropathy, with long-term current use of insulin (H)        CKD (chronic kidney disease) stage 1, GFR 90 ml/min or greater        Essential hypertension        Need for vaccination        Benign prostatic hyperplasia with nocturia        Other iron deficiency anemia          Care Instructions    1. If you want sleeping aid call me.     2. I will on labs    3. Your exam is good    4. We are watching the sodium and the hb level.  Stay with the current     5. Come back 6 months.           Follow-ups after your visit        Who to contact     If you have questions or need follow up information about today's clinic visit or your schedule please contact [facility] directly at [phone].  Normal or non-critical lab and imaging results will be communicated to you by iMusicianhart, letter or phone within 4 business days after the clinic has received the results. If you do not hear from us within 7 days, please contact the clinic through Thingy Clubt or phone. If you have a critical or abnormal lab result, we will notify you by phone as soon as possible.  Submit refill requests through Simphatic or call your pharmacy and they will forward the refill request to us. Please allow 3 business days for your refill to be completed.          Additional Information About Your Visit        iMusicianhart Information     Simphatic gives you secure access to your electronic health record. If you see a primary care provider, you can also send  "messages to your care team and make appointments. If you have questions, please call your primary care clinic.  If you do not have a primary care provider, please call [phone] and they will assist you.        Care EveryWhere ID     This is your Care EveryWhere ID. This could be used by other organizations to access your [facility] medical records  [id number]        Your Vitals Were     Pulse Height Pulse Oximetry BMI (Body Mass Index)          53 5' 6\" (1.676 m) 98% 29.7 kg/m2         Blood Pressure from Last 3 Encounters:   09/28/18 136/64   05/22/18 134/71   03/02/18 114/70    Weight from Last 3 Encounters:   09/28/18 184 lb (83.5 kg)   05/22/18 187 lb 9.6 oz (85.1 kg)   03/02/18 181 lb (82.1 kg)              We Performed the Following     ADMIN INFLUENZA  (For MEDICARE Patients ONLY) []     CBC with platelets     Comprehensive metabolic panel     Each additional admin.  (Right click and add QUANTITY)  [01229]     FLU VACCINE, INCREASED ANTIGEN, PRESV FREE, AGE 65+ [14260]     Hemoglobin A1c     TDAP VACCINE (ADACEL) [20841.002]     Vaccine Administration, Initial [80540]          Today's Medication Changes          These changes are accurate as of 9/28/18  9:51 AM.  If you have any questions, ask your nurse or doctor.               These medicines have changed or have updated prescriptions.        Dose/Directions    metFORMIN 500 MG tablet   Commonly known as:  GLUCOPHAGE   This may have changed:  additional instructions   Used for:  Type 2 diabetes mellitus with diabetic nephropathy, with long-term current use of insulin (H)        TAKE TWO TABLETS TWICE DAILY, WITH FOOD. Patient taking two in the morning and one at night   Quantity:  360 tablet   Refills:  3            Where to get your medicines      These medications were sent to [facility] - [place], [place] - [address]  [address], [facility] [address]     Phone:  [phone]     amLODIPine 5 MG tablet    " atorvastatin 20 MG tablet    glipiZIDE 5 MG 24 hr tablet    lisinopril 40 MG tablet    metFORMIN 500 MG tablet    metoprolol tartrate 25 MG tablet    tamsulosin 0.4 MG capsule                Primary Care Provider Office Phone # Fax #    Kelvin Keenan [phone]       [address] [facility] [address]        Equal Access to Services     [facility]: Hadii aad ku hadasho Soomaali, waaxda luqadaha, qaybta kaalmada adeegyada, waxay idiin hayaan adeeg kharash laProaan ah. So [facility] [phone].    ATENCIÓN: Si habla espedie, tiene a wang disposición servicios gratuitos de asistencia lingüística. Vikas al [phone].    We comply with applicable federal civil rights laws and [place] laws. We do not discriminate on the basis of race, color, national origin, age, disability, sex, sexual orientation, or gender identity.            Thank you!     Thank you for choosing [facility]  for your care. Our goal is always to provide you with excellent care. Hearing back from our patients is one way we can continue to improve our services. Please take a few minutes to complete the written survey that you may receive in the mail after your visit with us. Thank you!             Your Updated Medication List - Protect others around you: Learn how to safely use, store and throw away your medicines at www.disposemymeds.org.          This list is accurate as of 9/28/18  9:51 AM.  Always use your most recent med list.                   Brand Name Dispense Instructions for use Diagnosis    * albuterol (2.5 MG/3ML) 0.083% neb solution     25 vial    Take 1 vial (2.5 mg) by nebulization every 6 hours as needed for shortness of breath / dyspnea or wheezing    Acute bronchitis with symptoms > 10 days       * albuterol 108 (90 Base) MCG/ACT inhaler    PROAIR HFA/PROVENTIL HFA/VENTOLIN HFA    1 Inhaler    Inhale 2 puffs into the lungs every 6 hours as needed for shortness of breath /  dyspnea or wheezing    Acute bronchitis, unspecified organism       amLODIPine 5 MG tablet    NORVASC    180 tablet    Take 2 tablets (10 mg) by mouth daily    Benign essential hypertension       aspirin 81 MG tablet      Take 1 tablet by mouth daily.        atorvastatin 20 MG tablet    LIPITOR    90 tablet    Take 1 tablet (20 mg) by mouth daily    Pure hypercholesterolemia       * blood glucose monitoring test strip    no brand specified    2 Box    1 strip by In Vitro route daily Test 1x daily. Accu-Chek Marlene test strips    Type 2 diabetes mellitus with diabetic nephropathy, with long-term current use of insulin (H)       * ACCU-CHEK MARLENE test strip   Generic drug:  blood glucose monitoring     100 each    USE ONE STRIP TO CHECK GLUCOSE ONCE DAILY    Type 2 diabetes mellitus with diabetic nephropathy, with long-term current use of insulin (H)       calcium carb 1250 mg (500 mg [place])/vitamin D 200 unit 500-200 MG-UNIT per tablet    OSCAL with D     Take one tablet twice daily with food        glipiZIDE 5 MG 24 hr tablet    glipiZIDE XL    90 tablet    Take 1 tablet (5 mg) by mouth daily    Type 2 diabetes mellitus with diabetic nephropathy, with long-term current use of insulin (H)       lisinopril 40 MG tablet    PRINIVIL/ZESTRIL    180 tablet    Take 1 tablet (40 mg) by mouth 2 times daily    CKD (chronic kidney disease) stage 1, GFR 90 ml/min or greater       loratadine 10 MG tablet    CLARITIN     Take 10 mg by mouth daily        magnesium 500 MG Tabs      Take 500 mg by mouth daily        metFORMIN 500 MG tablet    GLUCOPHAGE    360 tablet    TAKE TWO TABLETS TWICE DAILY, WITH FOOD. Patient taking two in the morning and one at night    Type 2 diabetes mellitus with diabetic nephropathy, with long-term current use of insulin (H)       * metoprolol tartrate 25 MG tablet    LOPRESSOR    20 tablet    Take 1 tablet (25 mg) by mouth 2 times daily    Essential hypertension       * metoprolol tartrate 25 MG tablet     LOPRESSOR    180 tablet    Take 1 tablet (25 mg) by mouth 2 times daily    Essential hypertension       ORDER FOR ALLERGEN IMMUNOTHERAPY 5 mL vial     5 mL    Mix #3  [place] , [place], [place]  GLY1:20 w/v, HS  0.3ml Birch Mix GLY1:20 w/v, HS  0.3ml [place] GLY1:20 w/v, HS  0.3ml [place], [place] GLY1:20 w/v, HS  0.3ml [place], [place] GLY1:20 w/v 0.3ml [place] GLY 1:20 w/v, HS 0.3ml [place] GLY 1:20 w/v, ALK 0.3ml [place] GLY 1:20 w/v, HS 0.3ml [place] GLY 1:20 w/v, HS 0.3ml Rag[place], Short GLY 1:20 w/v HS 0.3ml [place] GLY 1:20 w/v, HS 0.3ml Diluent: HSAqs to 5ml    Chronic rhinitis       order for DME     1 Units    Nebulizer    Acute bronchitis with symptoms > 10 days       Potassium Gluconate 595 MG Caps      Take 595 mg by mouth daily        Selenium 200 MCG Tabs tablet      Take 200 mcg by mouth daily        tamsulosin 0.4 MG capsule    FLOMAX    60 capsule    Take 1 capsule (0.4 mg) by mouth daily    Benign prostatic hyperplasia with nocturia       VITAMIN B COMPLEX PO      Take 1 tablet by mouth daily.        * Notice:  This list has 6 medication(s) that are the same as other medications prescribed for you. Read the directions carefully, and ask your doctor or other care provider to review them with you.

## 2018-09-28 NOTE — PROGRESS NOTES
SUBJECTIVE:   Sae Milligan is a 72 year old male who presents to clinic today for the following health issues:      Diabetes Follow-up this man's diabetes is been under good control.  He does have a chronically depressed hemoglobin and sometimes has low serum sodium.  Otherwise having no ongoing problems today    Patient is checking blood sugars: once daily.  Results are as follows:         106's    Diabetic concerns: None     Symptoms of hypoglycemia (low blood sugar): none     Paresthesias (numbness or burning in feet) or sores: No     Date of last diabetic eye exam: due, will schedule    Diabetes Management Resources    Hyperlipidemia Follow-Up      Rate your low fat/cholesterol diet?: not monitoring fat    Taking statin?  Yes, no muscle aches from statin    Other lipid medications/supplements?:  none    Hypertension Follow-up      Outpatient blood pressures are being checked at home.  Results are 156/60's.    Low Salt Diet: some added salt, occasionally    BP Readings from Last 2 Encounters:   05/22/18 134/71   03/02/18 114/70     Hemoglobin A1C (%)   Date Value   04/24/2018 6.2 (H)   11/20/2017 5.8     LDL Cholesterol Calculated (mg/dL)   Date Value   11/20/2017 20   09/29/2016 30       Amount of exercise or physical activity: None    Problems taking medications regularly: No    Medication side effects: none    Diet: regular (no restrictions)            Problem list and histories reviewed & adjusted, as indicated.  Additional history: as documented    Patient Active Problem List   Diagnosis     Type 2 diabetes mellitus with diabetic nephropathy (H)     Hyperlipidemia LDL goal <100     Other chronic allergic conjunctivitis     DJD (degenerative joint disease)     Health Care Home     Allergic rhinitis     CKD (chronic kidney disease) stage 1, GFR 90 ml/min or greater     Squamous cell carcinoma     History of squamous cell carcinoma     SK (seborrheic keratosis)     Lentigo     Family history of prostate  cancer     Restless leg syndrome     Anxiety     Past Surgical History:   Procedure Laterality Date     ARTHROPLASTY KNEE  2/10/2011    ARTHROPLASTY KNEE performed by DARLING FARRELL at [facility]     COLONOSCOPY N/A 3/2/2018    Procedure: COLONOSCOPY;  colonoscopy;  Surgeon: Aris Whittaker MD;  Location: [facility]     MOHS MICROGRAPHIC PROCEDURE  july 2012    Sq. cell left helix     ORTHOPEDIC SURGERY       SURGICAL HISTORY OF -   12/2003    Cervical spine fx       Social History   Substance Use Topics     Smoking status: Former Smoker     Quit date: 3/14/1990     Smokeless tobacco: Never Used     Alcohol use Yes      Comment: occasional     Family History   Problem Relation Age of Onset     Hypertension Mother      Cerebrovascular Disease Mother      Hypertension Father      Cerebrovascular Disease Father      Diabetes Brother      Prostate Cancer Brother      Prostate Cancer Brother          Current Outpatient Prescriptions   Medication Sig Dispense Refill     ACCU-CHEK MARLENE test strip USE ONE STRIP TO CHECK GLUCOSE ONCE DAILY 100 each 1     albuterol (2.5 MG/3ML) 0.083% neb solution Take 1 vial (2.5 mg) by nebulization every 6 hours as needed for shortness of breath / dyspnea or wheezing 25 vial 0     amLODIPine (NORVASC) 5 MG tablet Take 2 tablets (10 mg) by mouth daily 180 tablet 3     aspirin 81 MG tablet Take 1 tablet by mouth daily.       atorvastatin (LIPITOR) 20 MG tablet Take 1 tablet (20 mg) by mouth daily 90 tablet 3     B Complex Vitamins (VITAMIN B COMPLEX PO) Take 1 tablet by mouth daily.       blood glucose monitoring (NO BRAND SPECIFIED) test strip 1 strip by In Vitro route daily Test 1x daily. Accu-Chek Marlene test strips 2 Box 3     CALCIUM CARBONATE-VITAMIN D 500-200 MG-UNIT OR TABS Take one tablet twice daily with food       glipiZIDE (GLIPIZIDE XL) 5 MG 24 hr tablet Take 1 tablet (5 mg) by mouth daily 90 tablet 3     lisinopril (PRINIVIL/ZESTRIL) 40 MG tablet Take 1 tablet (40 mg) by mouth 2 times  daily 180 tablet 3     magnesium 500 MG TABS Take 500 mg by mouth daily       metFORMIN (GLUCOPHAGE) 500 MG tablet TAKE TWO TABLETS TWICE DAILY, WITH FOOD. Patient taking two in the morning and one at night 360 tablet 3     metoprolol tartrate (LOPRESSOR) 25 MG tablet Take 1 tablet (25 mg) by mouth 2 times daily 180 tablet 1     order for DME Nebulizer 1 Units 0     Potassium Gluconate 595 MG CAPS Take 595 mg by mouth daily       Selenium 200 MCG TABS Take 200 mcg by mouth daily       tamsulosin (FLOMAX) 0.4 MG capsule Take 1 capsule (0.4 mg) by mouth daily 60 capsule 3     albuterol (PROAIR HFA/PROVENTIL HFA/VENTOLIN HFA) 108 (90 Base) MCG/ACT Inhaler Inhale 2 puffs into the lungs every 6 hours as needed for shortness of breath / dyspnea or wheezing 1 Inhaler 0     loratadine (CLARITIN) 10 MG tablet Take 10 mg by mouth daily       metoprolol tartrate (LOPRESSOR) 25 MG tablet Take 1 tablet (25 mg) by mouth 2 times daily 20 tablet 0     ORDER FOR ALLERGEN IMMUNOTHERAPY Mix #3  Tree , Weeds  [place]t, [place]  GLY1:20 w/v, HS  0.3ml  Birch Mix GLY1:20 w/v, HS  0.3ml  [place] GLY1:20 w/v, HS  0.3ml  Elm, American GLY1:20 w/v, HS  0.3ml  [place], Black GLY1:20 w/v 0.3ml  [place] GLY 1:20 w/v, HS 0.3ml  Lamb's Quarters GLY 1:20 w/v, ALK 0.3ml  Marshelder-Povertyweed GLY 1:20 w/v, HS 0.3ml  Nettle GLY 1:20 w/v, HS 0.3ml  Ragweed, Short GLY 1:20 w/v HS 0.3ml  Russian Thistle GLY 1:20 w/v, HS 0.3ml  Diluent: HSAqs to 5ml 5 mL PRN     [DISCONTINUED] amLODIPine (NORVASC) 5 MG tablet Take 2 tablets (10 mg) by mouth daily 180 tablet 3     [DISCONTINUED] atorvastatin (LIPITOR) 20 MG tablet Take 1 tablet (20 mg) by mouth daily 90 tablet 3     [DISCONTINUED] glipiZIDE (GLIPIZIDE XL) 5 MG 24 hr tablet Take 1 tablet (5 mg) by mouth daily 90 tablet 3     [DISCONTINUED] lisinopril (PRINIVIL/ZESTRIL) 40 MG tablet Take 1 tablet (40 mg) by mouth 2 times daily 180 tablet 3     [DISCONTINUED] metFORMIN (GLUCOPHAGE) 500 MG tablet TAKE TWO  TABLETS TWICE DAILY, WITH FOOD. 360 tablet 3     [DISCONTINUED] metoprolol tartrate (LOPRESSOR) 25 MG tablet Take 1 tablet (25 mg) by mouth 2 times daily 180 tablet 1     Allergies   Allergen Reactions     Penicillins Hives, Itching and Swelling     Of lips     Recent Labs   Lab Test  09/28/18   0954  05/22/18   0949  04/24/18   0846   11/20/17   0927   09/29/16   0814   10/06/15   0743   11/06/13   0832   04/25/13   0937   01/10/12   0925   A1C  5.6   --   6.2*   --   5.8   < >  5.4   < >  6.1*   < >  6.1*   --   6.4*   < >  6.1*   LDL   --    --    --    --   20   --   30   --   50   < >  91   < >  112   < >  92   HDL   --    --    --    --   63   --   68   --   53   < >  47   < >  55   < >  50   TRIG   --    --    --    --   102   --   57   --   67   < >  80   < >  99   < >  75   ALT   --    --    --    --    --    --    --    --    --    --   41   --   39   --   31   CR   --   1.09  1.14   < >  1.12   < >   --    < >  0.88   < >  0.89   --   0.89   < >  0.86   GFRESTIMATED   --   66  63   < >  64   < >   --    < >  85   < >  85   --   85   < >  89   GFRESTBLACK   --   80  76   < >  78   < >   --    < >  >90   GFR Calc     < >  >90   --   >90   < >  >90   POTASSIUM   --   4.8  4.9   < >  4.7   < >   --    < >  5.0   < >  5.3   --   4.9   < >  4.9   TSH   --   1.46   --    --    --    --   2.82   --    --    < >   --    --    --    --   1.41    < > = values in this interval not displayed.      BP Readings from Last 3 Encounters:   09/28/18 136/64   05/22/18 134/71   03/02/18 114/70    Wt Readings from Last 3 Encounters:   09/28/18 184 lb (83.5 kg)   05/22/18 187 lb 9.6 oz (85.1 kg)   03/02/18 181 lb (82.1 kg)                    Reviewed and updated as needed this visit by clinical staff       Reviewed and updated as needed this visit by Provider         ROS:  CONSTITUTIONAL: NEGATIVE for fever, chills, change in weight  ENT/MOUTH: NEGATIVE for ear, mouth and throat problems  RESP: NEGATIVE for  "significant cough or SOB  CV: NEGATIVE for chest pain, palpitations or peripheral edema    OBJECTIVE:     /64 (BP Location: Right arm, Patient Position: Chair, Cuff Size: Adult Large)  Pulse 53  Ht 5' 6\" (1.676 m)  Wt 184 lb (83.5 kg)  SpO2 98%  BMI 29.7 kg/m2  Body mass index is 29.7 kg/(m^2).  GENERAL: healthy, alert and no distress  NECK: no adenopathy, no asymmetry, masses, or scars and thyroid normal to palpation  RESP: lungs clear to auscultation - no rales, rhonchi or wheezes  CV: regular rate and rhythm, normal S1 S2, no S3 or S4, no murmur, click or rub, no peripheral edema and peripheral pulses strong  ABDOMEN: soft, nontender, no hepatosplenomegaly, no masses and bowel sounds normal  MS: no gross musculoskeletal defects noted, no edema        ASSESSMENT/PLAN:             1. Benign essential hypertension  Stable  - amLODIPine (NORVASC) 5 MG tablet; Take 2 tablets (10 mg) by mouth daily  Dispense: 180 tablet; Refill: 3  - Comprehensive metabolic panel    2. Pure hypercholesterolemia  Good control  - atorvastatin (LIPITOR) 20 MG tablet; Take 1 tablet (20 mg) by mouth daily  Dispense: 90 tablet; Refill: 3    3. Type 2 diabetes mellitus with diabetic nephropathy, with long-term current use of insulin (H)    - glipiZIDE (GLIPIZIDE XL) 5 MG 24 hr tablet; Take 1 tablet (5 mg) by mouth daily  Dispense: 90 tablet; Refill: 3  - metFORMIN (GLUCOPHAGE) 500 MG tablet; TAKE TWO TABLETS TWICE DAILY, WITH FOOD. Patient taking two in the morning and one at night  Dispense: 360 tablet; Refill: 3  - Comprehensive metabolic panel  - Hemoglobin A1c    4. CKD (chronic kidney disease) stage 1, GFR 90 ml/min or greater    - lisinopril (PRINIVIL/ZESTRIL) 40 MG tablet; Take 1 tablet (40 mg) by mouth 2 times daily  Dispense: 180 tablet; Refill: 3    5. Essential hypertension    - metoprolol tartrate (LOPRESSOR) 25 MG tablet; Take 1 tablet (25 mg) by mouth 2 times daily  Dispense: 180 tablet; Refill: 1    6. Need for " prophylactic vaccination and inoculation against influenza    - FLU VACCINE, INCREASED ANTIGEN, PRESV FREE, AGE 65+ [51342]  - Vaccine Administration, Initial [60785]    7. Need for vaccination    - TDAP VACCINE (ADACEL) [37672.002]  - ADMIN INFLUENZA  (For MEDICARE Patients ONLY) []  - Each additional admin.  (Right click and add QUANTITY)  [69824]    8. Benign prostatic hyperplasia with nocturia    - tamsulosin (FLOMAX) 0.4 MG capsule; Take 1 capsule (0.4 mg) by mouth daily  Dispense: 60 capsule; Refill: 3    9. Other iron deficiency anemia    - CBC with platelets      Kelvin Keenan MD  [facility]    Injectable Influenza Immunization Documentation    1.  Is the person to be vaccinated sick today?   No    2. Does the person to be vaccinated have an allergy to a component   of the vaccine?   No  Egg Allergy Algorithm Link    3. Has the person to be vaccinated ever had a serious reaction   to influenza vaccine in the past?   No    4. Has the person to be vaccinated ever had Guillain-Barré syndrome?   No    Form completed by Janelle Munson

## 2018-10-02 ENCOUNTER — TELEPHONE (OUTPATIENT)
Dept: FAMILY MEDICINE | Facility: CLINIC | Age: 73
End: 2018-10-02

## 2018-10-02 DIAGNOSIS — Z79.4 TYPE 2 DIABETES MELLITUS WITH DIABETIC NEPHROPATHY, WITH LONG-TERM CURRENT USE OF INSULIN (H): ICD-10-CM

## 2018-10-02 DIAGNOSIS — E11.21 TYPE 2 DIABETES MELLITUS WITH DIABETIC NEPHROPATHY, WITH LONG-TERM CURRENT USE OF INSULIN (H): ICD-10-CM

## 2018-10-02 NOTE — TELEPHONE ENCOUNTER
metFORMIN (GLUCOPHAGE) 500 MG tablet 360 tablet 3 9/28/2018  No   Sig: TAKE TWO TABLETS TWICE DAILY, WITH FOOD. Patient taking two in the morning and one at night     Pharmacy questioning directions ?  Which one?

## 2018-10-02 NOTE — TELEPHONE ENCOUNTER
Dr. Keenan, please clarify what dose pt should be on, 2 tabs BID or 2 in the AM and 1 at NOC  Medication Detail      Disp Refills Start End MOSHE   metFORMIN (GLUCOPHAGE) 500 MG tablet 360 tablet 3 9/28/2018  No   Sig: TAKE TWO TABLETS TWICE DAILY, WITH FOOD. Patient taking two in the morning and one at night   Class: E-Prescribe   Notes to Pharmacy: Profile Rx: patient will contact pharmacy when needed   Order: [id number]   E-Prescribing Status: Receipt confirmed by pharmacy (9/28/2018  9:43 AM CDT)     Mara HILL RN

## 2018-10-03 NOTE — TELEPHONE ENCOUNTER
It looks like his A1c is fine on the 2 in the AM and 1 in the PM so let us go ahead and make sure that that is what he is taking on the chart and let the pharmacy know that thank you

## 2018-10-04 NOTE — TELEPHONE ENCOUNTER
Pt called. States he is taking 2 tablets of metformin in am and 1 tablet in evening. Med refilled. Pt given results. Camila Mendenhall RN

## 2018-10-16 ENCOUNTER — OFFICE VISIT (OUTPATIENT)
Dept: FAMILY MEDICINE | Facility: CLINIC | Age: 73
End: 2018-10-16
Payer: COMMERCIAL

## 2018-10-16 VITALS
TEMPERATURE: 97.8 F | HEART RATE: 64 BPM | BODY MASS INDEX: 29.86 KG/M2 | DIASTOLIC BLOOD PRESSURE: 56 MMHG | SYSTOLIC BLOOD PRESSURE: 128 MMHG | RESPIRATION RATE: 14 BRPM | WEIGHT: 185 LBS

## 2018-10-16 DIAGNOSIS — J01.90 ACUTE SINUSITIS WITH SYMPTOMS > 10 DAYS: Primary | ICD-10-CM

## 2018-10-16 PROCEDURE — 99213 OFFICE O/P EST LOW 20 MIN: CPT | Performed by: PHYSICIAN ASSISTANT

## 2018-10-16 RX ORDER — DOXYCYCLINE 100 MG/1
100 CAPSULE ORAL 2 TIMES DAILY
Qty: 20 CAPSULE | Refills: 0 | Status: SHIPPED | OUTPATIENT
Start: 2018-10-16 | End: 2019-03-13

## 2018-10-16 ASSESSMENT — ENCOUNTER SYMPTOMS
SHORTNESS OF BREATH: 0
DIARRHEA: 0
VOMITING: 0
SINUS PAIN: 1
EYE DISCHARGE: 0
NAUSEA: 0
ABDOMINAL PAIN: 0
MYALGIAS: 0
BLURRED VISION: 0
SORE THROAT: 0
CHILLS: 0
HEADACHES: 0
WHEEZING: 0
FEVER: 0
PALPITATIONS: 0
EYE REDNESS: 0
COUGH: 0

## 2018-10-16 NOTE — NURSING NOTE
"Chief Complaint   Patient presents with     Ear Problem       Initial /56 (BP Location: Right arm, Cuff Size: Adult Regular)  Pulse 64  Temp 97.8  F (36.6  C) (Tympanic)  Resp 14  Wt 185 lb (83.9 kg)  BMI 29.86 kg/m2 Estimated body mass index is 29.86 kg/(m^2) as calculated from the following:    Height as of 9/28/18: 5' 6\" (1.676 m).    Weight as of this encounter: 185 lb (83.9 kg).    Patient presents to the clinic using No DME    Health Maintenance that is potentially due pending provider review:  NONE    n/a    Is there anyone who you would like to be able to receive your results? Not Applicable  If yes have patient fill out LORNE  Anastasia Sanders M.A.        "

## 2018-10-16 NOTE — MR AVS SNAPSHOT
After Visit Summary   10/16/2018    Sae Milligan    MRN: [id number]           Patient Information     Date Of Birth          1945        Visit Information        Provider Department      10/16/2018 11:40 AM Bailey Bruce PA-C [facility]        Today's Diagnoses     Acute sinusitis with symptoms > 10 days    -  1       Follow-ups after your visit        Follow-up notes from your care team     Return if symptoms worsen or fail to improve.      Who to contact     If you have questions or need follow up information about today's clinic visit or your schedule please contact [facility] directly at [phone].  Normal or non-critical lab and imaging results will be communicated to you by MyChart, letter or phone within 4 business days after the clinic has received the results. If you do not hear from us within 7 days, please contact the clinic through icehart or phone. If you have a critical or abnormal lab result, we will notify you by phone as soon as possible.  Submit refill requests through [employer] or call your pharmacy and they will forward the refill request to us. Please allow 3 business days for your refill to be completed.          Additional Information About Your Visit        MyChart Information     [employer] gives you secure access to your electronic health record. If you see a primary care provider, you can also send messages to your care team and make appointments. If you have questions, please call your primary care clinic.  If you do not have a primary care provider, please call [phone] and they will assist you.        Care EveryWhere ID     This is your Care EveryWhere ID. This could be used by other organizations to access your [place] medical records  [id number]        Your Vitals Were     Pulse Temperature Respirations BMI (Body Mass Index)          64 97.8  F (36.6  C) (Tympanic) 14 29.86 kg/m2         Blood Pressure from Last 3  Encounters:   10/16/18 128/56   09/28/18 136/64   05/22/18 134/71    Weight from Last 3 Encounters:   10/16/18 185 lb (83.9 kg)   09/28/18 184 lb (83.5 kg)   05/22/18 187 lb 9.6 oz (85.1 kg)              Today, you had the following     No orders found for display         Today's Medication Changes          These changes are accurate as of 10/16/18  2:12 PM.  If you have any questions, ask your nurse or doctor.               Start taking these medicines.        Dose/Directions    doxycycline monohydrate 100 MG capsule   Used for:  Acute sinusitis with symptoms > 10 days   Started by:  Bailey Bruce PA-C        Dose:  100 mg   Take 1 capsule (100 mg) by mouth 2 times daily for 10 days   Quantity:  20 capsule   Refills:  0            Where to get your medicines      These medications were sent to [id number] [address] [address]    Hours:  Closed 10-16-08 business to [facility] Phone:  [phone]     doxycycline monohydrate 100 MG capsule                Primary Care Provider Office Phone # Fax #    Kelvin Keenan [phone]       [address] [place] [id number]        Equal Access to Services     KACEY GARLAND AH: Hadmarilyn reagan hadasho Soomaali, waaxda luqadaha, qaybta kaalmada adeegyada, lindsey saldivar. So [facility] [phone].    ATENCIÓN: Si habla español, tiene a wang disposición servicios gratuitos de asistencia lingüística. Vikas al [phone].    We comply with applicable federal civil rights laws and [place] laws. We do not discriminate on the basis of race, color, national origin, age, disability, sex, sexual orientation, or gender identity.            Thank you!     Thank you for choosing [facility]  for your care. Our goal is always to provide you with excellent care. Hearing back from our patients is one way we can continue to improve our  services. Please take a few minutes to complete the written survey that you may receive in the mail after your visit with us. Thank you!             Your Updated Medication List - Protect others around you: Learn how to safely use, store and throw away your medicines at www.disposemymeds.org.          This list is accurate as of 10/16/18  2:12 PM.  Always use your most recent med list.                   Brand Name Dispense Instructions for use Diagnosis    * albuterol (2.5 MG/3ML) 0.083% neb solution     25 vial    Take 1 vial (2.5 mg) by nebulization every 6 hours as needed for shortness of breath / dyspnea or wheezing    Acute bronchitis with symptoms > 10 days       * albuterol 108 (90 Base) MCG/ACT inhaler    PROAIR HFA/PROVENTIL HFA/VENTOLIN HFA    1 Inhaler    Inhale 2 puffs into the lungs every 6 hours as needed for shortness of breath / dyspnea or wheezing    Acute bronchitis, unspecified organism       amLODIPine 5 MG tablet    NORVASC    180 tablet    Take 2 tablets (10 mg) by mouth daily    Benign essential hypertension       aspirin 81 MG tablet      Take 1 tablet by mouth daily.        atorvastatin 20 MG tablet    LIPITOR    90 tablet    Take 1 tablet (20 mg) by mouth daily    Pure hypercholesterolemia       * blood glucose monitoring test strip    no brand specified    2 Box    1 strip by In Vitro route daily Test 1x daily. Accu-Chek Marlene test strips    Type 2 diabetes mellitus with diabetic nephropathy, with long-term current use of insulin (H)       * ACCU-CHEK MARLENE test strip   Generic drug:  blood glucose monitoring     100 each    USE ONE STRIP TO CHECK GLUCOSE ONCE DAILY    Type 2 diabetes mellitus with diabetic nephropathy, with long-term current use of insulin (H)       calcium carb 1250 mg (500 mg C[place]il DeHe)/vitamin D 200 unit 500-200 MG-UNIT per tablet    OSCAL with D     Take one tablet twice daily with food        doxycycline monohydrate 100 MG capsule     20 capsule    Take 1 capsule (100 mg)  by mouth 2 times daily for 10 days    Acute sinusitis with symptoms > 10 days       glipiZIDE 5 MG 24 hr tablet    glipiZIDE XL    90 tablet    Take 1 tablet (5 mg) by mouth daily    Type 2 diabetes mellitus with diabetic nephropathy, with long-term current use of insulin (H)       lisinopril 40 MG tablet    PRINIVIL/ZESTRIL    180 tablet    Take 1 tablet (40 mg) by mouth 2 times daily    CKD (chronic kidney disease) stage 1, GFR 90 ml/min or greater       loratadine 10 MG tablet    CLARITIN     Take 10 mg by mouth daily        magnesium 500 MG Tabs      Take 500 mg by mouth daily        metFORMIN 500 MG tablet    GLUCOPHAGE    270 tablet    2 tablets in the morning and one tablet at night by mouth    Type 2 diabetes mellitus with diabetic nephropathy, with long-term current use of insulin (H)       * metoprolol tartrate 25 MG tablet    LOPRESSOR    20 tablet    Take 1 tablet (25 mg) by mouth 2 times daily    Essential hypertension       * metoprolol tartrate 25 MG tablet    LOPRESSOR    180 tablet    Take 1 tablet (25 mg) by mouth 2 times daily    Essential hypertension       ORDER FOR ALLERGEN IMMUNOTHERAPY 5 mL vial     5 mL    Mix #3  Tree , [place], [place]  GLY1:20 w/v, HS  0.3ml Birch Mix GLY1:20 w/v, HS  0.3ml [place] GLY1:20 w/v, HS  0.3ml Elm, American GLY1:20 w/v, HS  0.3ml [place], Black GLY1:20 w/v 0.3ml [place] GLY 1:20 w/v, HS 0.3ml Lamb's Quarters GLY 1:20 w/v, ALK 0.3ml [place]er-Povertyweed GLY 1:20 w/v, HS 0.3ml Nettle GLY 1:20 w/v, HS 0.3ml Ragweed, Short GLY 1:20 w/v HS 0.3ml Russian Thistle GLY 1:20 w/v, HS 0.3ml Diluent: HSAqs to 5ml    Chronic rhinitis       order for DME     1 Units    Nebulizer    Acute bronchitis with symptoms > 10 days       Potassium Gluconate 595 MG Caps      Take 595 mg by mouth daily        Selenium 200 MCG Tabs tablet      Take 200 mcg by mouth daily        tamsulosin 0.4 MG capsule    FLOMAX    60 capsule    Take 1 capsule (0.4 mg) by mouth daily    Benign  prostatic hyperplasia with nocturia       VITAMIN B COMPLEX PO      Take 1 tablet by mouth daily.        * Notice:  This list has 6 medication(s) that are the same as other medications prescribed for you. Read the directions carefully, and ask your doctor or other care provider to review them with you.

## 2018-10-16 NOTE — PROGRESS NOTES
SUBJECTIVE:   Sae Milligan is a 72 year old male who presents to clinic today for the following health issues:    ENT Symptoms             Symptoms: cc Present Absent Comment   Fever/Chills       Fatigue  x  Hard to sleep   Muscle Aches  x  Sore neck    Eye Irritation       Sneezing       Nasal Benjamín/Drg       Sinus Pressure/Pain  x     Loss of smell       Dental pain       Sore Throat       Swollen Glands       Ear Pain/Fullness  x  For sure left ear, unsure of right   Cough       Wheeze       Chest Pain       Shortness of breath       Rash       Other         Symptom duration:  1+ week   Symptom severity:  worse    Treatments tried:  Tylenol    Contacts:  none       Problem list and histories reviewed & adjusted, as indicated.  Additional history: as documented    Patient Active Problem List   Diagnosis     Type 2 diabetes mellitus with diabetic nephropathy (H)     Hyperlipidemia LDL goal <100     Other chronic allergic conjunctivitis     DJD (degenerative joint disease)     Health Care Home     Allergic rhinitis     CKD (chronic kidney disease) stage 1, GFR 90 ml/min or greater     Squamous cell carcinoma     History of squamous cell carcinoma     SK (seborrheic keratosis)     Lentigo     Family history of prostate cancer     Restless leg syndrome     Anxiety     Past Surgical History:   Procedure Laterality Date     ARTHROPLASTY KNEE  2/10/2011    ARTHROPLASTY KNEE performed by DARLING FARRELL at [facility]     COLONOSCOPY N/A 3/2/2018    Procedure: COLONOSCOPY;  colonoscopy;  Surgeon: Aris Whittaker MD;  Location: [facility]     MOHS MICROGRAPHIC PROCEDURE  july 2012    Sq. cell left helix     ORTHOPEDIC SURGERY       SURGICAL HISTORY OF -   12/2003    Cervical spine fx       Social History   Substance Use Topics     Smoking status: Former Smoker     Quit date: 3/14/1990     Smokeless tobacco: Never Used     Alcohol use Yes      Comment: occasional     Family History   Problem Relation Age of Onset     Hypertension  Mother      Cerebrovascular Disease Mother      Hypertension Father      Cerebrovascular Disease Father      Diabetes Brother      Prostate Cancer Brother      Prostate Cancer Brother          Current Outpatient Prescriptions   Medication Sig Dispense Refill     ACCU-CHEK ANTONIA test strip USE ONE STRIP TO CHECK GLUCOSE ONCE DAILY 100 each 1     albuterol (2.5 MG/3ML) 0.083% neb solution Take 1 vial (2.5 mg) by nebulization every 6 hours as needed for shortness of breath / dyspnea or wheezing 25 vial 0     albuterol (PROAIR HFA/PROVENTIL HFA/VENTOLIN HFA) 108 (90 Base) MCG/ACT Inhaler Inhale 2 puffs into the lungs every 6 hours as needed for shortness of breath / dyspnea or wheezing 1 Inhaler 0     amLODIPine (NORVASC) 5 MG tablet Take 2 tablets (10 mg) by mouth daily 180 tablet 3     aspirin 81 MG tablet Take 1 tablet by mouth daily.       atorvastatin (LIPITOR) 20 MG tablet Take 1 tablet (20 mg) by mouth daily 90 tablet 3     B Complex Vitamins (VITAMIN B COMPLEX PO) Take 1 tablet by mouth daily.       blood glucose monitoring (NO BRAND SPECIFIED) test strip 1 strip by In Vitro route daily Test 1x daily. Accu-Chek Antonia test strips 2 Box 3     doxycycline monohydrate 100 MG capsule Take 1 capsule (100 mg) by mouth 2 times daily for 10 days 20 capsule 0     glipiZIDE (GLIPIZIDE XL) 5 MG 24 hr tablet Take 1 tablet (5 mg) by mouth daily 90 tablet 3     lisinopril (PRINIVIL/ZESTRIL) 40 MG tablet Take 1 tablet (40 mg) by mouth 2 times daily 180 tablet 3     magnesium 500 MG TABS Take 500 mg by mouth daily       metFORMIN (GLUCOPHAGE) 500 MG tablet 2 tablets in the morning and one tablet at night by mouth 270 tablet 1     metoprolol tartrate (LOPRESSOR) 25 MG tablet Take 1 tablet (25 mg) by mouth 2 times daily 180 tablet 1     metoprolol tartrate (LOPRESSOR) 25 MG tablet Take 1 tablet (25 mg) by mouth 2 times daily 20 tablet 0     order for DME Nebulizer 1 Units 0     Potassium Gluconate 595 MG CAPS Take 595 mg by mouth  daily       Selenium 200 MCG TABS Take 200 mcg by mouth daily       tamsulosin (FLOMAX) 0.4 MG capsule Take 1 capsule (0.4 mg) by mouth daily 60 capsule 3     CALCIUM CARBONATE-VITAMIN D 500-200 MG-UNIT OR TABS Take one tablet twice daily with food       loratadine (CLARITIN) 10 MG tablet Take 10 mg by mouth daily       ORDER FOR ALLERGEN IMMUNOTHERAPY Mix #3  Tree , [place]  [place]t, White  GLY1:20 w/v, HS  0.3ml  Birch Mix GLY1:20 w/v, HS  0.3ml  [place] GLY1:20 w/v, HS  0.3ml  Elm, American GLY1:20 w/v, HS  0.3ml  [place], Black GLY1:20 w/v 0.3ml  [place] GLY 1:20 w/v, HS 0.3ml  Lamb's Quarters GLY 1:20 w/v, ALK 0.3ml  Marshelder-Povertyweed GLY 1:20 w/v, HS 0.3ml  Nettle GLY 1:20 w/v, HS 0.3ml  Ragweed, Short GLY 1:20 w/v HS 0.3ml  Russian Thistle GLY 1:20 w/v, HS 0.3ml  Diluent: HSAqs to 5ml (Patient not taking: Reported on 10/16/2018) 5 mL PRN     Allergies   Allergen Reactions     Penicillins Hives, Itching and Swelling     Of lips     Labs reviewed in EPIC    Reviewed and updated as needed this visit by clinical staff  Tobacco  Allergies  Meds  Problems  Med Hx  Surg Hx  Fam Hx  Soc Hx        Reviewed and updated as needed this visit by Provider  Allergies  Meds  Problems         ROS:  Review of Systems   Constitutional: Positive for malaise/fatigue. Negative for chills and fever.   HENT: Positive for congestion, ear pain and sinus pain. Negative for sore throat.    Eyes: Negative for blurred vision, discharge and redness.   Respiratory: Negative for cough, shortness of breath and wheezing.    Cardiovascular: Negative for chest pain and palpitations.   Gastrointestinal: Negative for abdominal pain, diarrhea, nausea and vomiting.   Musculoskeletal: Negative for joint pain and myalgias.   Skin: Negative for rash.   Neurological: Negative for headaches.         OBJECTIVE:     /56 (BP Location: Right arm, Cuff Size: Adult Regular)  Pulse 64  Temp 97.8  F (36.6  C) (Tympanic)  Resp 14  Wt 185 lb  (83.9 kg)  BMI 29.86 kg/m2  Body mass index is 29.86 kg/(m^2).    Physical Exam   Constitutional: He is well-developed, well-nourished, and in no distress.   HENT:   Head: Normocephalic.   Right Ear: Tympanic membrane and ear canal normal.   Left Ear: Tympanic membrane and ear canal normal.   Nose: Mucosal edema and rhinorrhea present.   Mouth/Throat: Posterior oropharyngeal erythema present. No oropharyngeal exudate.   Eyes: Conjunctivae are normal. Pupils are equal, round, and reactive to light.   Cardiovascular: Normal rate, regular rhythm and normal heart sounds.    Pulmonary/Chest: Effort normal and breath sounds normal.   Skin: No rash noted.   Psychiatric:   Alert and cooperative       Diagnostic Test Results:  none     ASSESSMENT/PLAN:     1. Acute sinusitis with symptoms > 10 days  Will treat with doxycycline x 10 days. Get plenty of rest and push fluids. Continue with supportive care. Follow up as needed.     - doxycycline monohydrate 100 MG capsule; Take 1 capsule (100 mg) by mouth 2 times daily for 10 days  Dispense: 20 capsule; Refill: 0       Bailey Bruce PA-C  [facility]

## 2018-10-19 ENCOUNTER — TELEPHONE (OUTPATIENT)
Dept: FAMILY MEDICINE | Facility: CLINIC | Age: 73
End: 2018-10-19

## 2018-10-19 NOTE — TELEPHONE ENCOUNTER
Reason for Call:  Other call back    Detailed comments: Pt was seen 10/16/18 by Bailey Bruce and treated for sinus infection. He has been taking medication since Tuesday and he is not feeling any improvement. Pain in back of ears and down back of neck. Shouldn't he be having some improvement? He is having trouble sleeping.    Phone Number Patient can be reached at: Home number on file [phone] (home)    Best Time: any    Can we leave a detailed message on this number?     Call taken on 10/19/2018 at 12:26 PM by Janett Ruiz

## 2018-10-19 NOTE — TELEPHONE ENCOUNTER
Would you want to change the antibiotic? He is on day 4 of the Doxycycline and does not feel any better at all.

## 2018-10-19 NOTE — TELEPHONE ENCOUNTER
Called patient and recommended Flonase for his ear pressure and hot pad to the neck since this sounds muscular in nature with good support of neck at night with sleeping.  Continue Doxycyline and follow-up on Monday if any persistent symptoms. Patient verbalized understanding and acceptance of this plan.  Evelyn Núñez NP on 10/19/2018 at 4:40 PM

## 2018-10-22 ENCOUNTER — RADIANT APPOINTMENT (OUTPATIENT)
Dept: GENERAL RADIOLOGY | Facility: CLINIC | Age: 73
End: 2018-10-22
Attending: FAMILY MEDICINE
Payer: COMMERCIAL

## 2018-10-22 ENCOUNTER — OFFICE VISIT (OUTPATIENT)
Dept: FAMILY MEDICINE | Facility: CLINIC | Age: 73
End: 2018-10-22
Payer: COMMERCIAL

## 2018-10-22 VITALS
SYSTOLIC BLOOD PRESSURE: 136 MMHG | WEIGHT: 181.8 LBS | BODY MASS INDEX: 29.34 KG/M2 | DIASTOLIC BLOOD PRESSURE: 70 MMHG | HEART RATE: 63 BPM | OXYGEN SATURATION: 98 % | TEMPERATURE: 96.8 F

## 2018-10-22 DIAGNOSIS — M54.2 NECK PAIN: ICD-10-CM

## 2018-10-22 DIAGNOSIS — M54.2 NECK PAIN: Primary | ICD-10-CM

## 2018-10-22 PROCEDURE — 99213 OFFICE O/P EST LOW 20 MIN: CPT | Performed by: FAMILY MEDICINE

## 2018-10-22 PROCEDURE — 72040 X-RAY EXAM NECK SPINE 2-3 VW: CPT | Mod: FY

## 2018-10-22 NOTE — NURSING NOTE
"Chief Complaint   Patient presents with     Sinus Problem       Initial /70 (BP Location: Right arm, Patient Position: Sitting, Cuff Size: Adult Large)  Pulse 63  Temp 96.8  F (36  C) (Tympanic)  Wt 181 lb 12.8 oz (82.5 kg)  SpO2 98%  BMI 29.34 kg/m2 Estimated body mass index is 29.34 kg/(m^2) as calculated from the following:    Height as of 9/28/18: 5' 6\" (1.676 m).    Weight as of this encounter: 181 lb 12.8 oz (82.5 kg).    Patient presents to the clinic using No DME    Health Maintenance that is potentially due pending provider review:  NONE    n/a    Is there anyone who you would like to be able to receive your results? No  If yes have patient fill out LORNE    Noemi WEAVER CMA    "

## 2018-10-22 NOTE — PROGRESS NOTES
SUBJECTIVE:   Sae Milligan is a 72 year old male who presents to clinic today for the following health issues:    Recheck from last visit.-Patient states that he saw Bailey and she treated him for a sinus infection. Patient states that his neck hurts and that he really isn't feeling better  His main complaint is neck pain with motion in both directions        Problem list and histories reviewed & adjusted, as indicated.  Additional history: as documented    Patient Active Problem List   Diagnosis     Type 2 diabetes mellitus with diabetic nephropathy (H)     Hyperlipidemia LDL goal <100     Other chronic allergic conjunctivitis     DJD (degenerative joint disease)     Health Care Home     Allergic rhinitis     CKD (chronic kidney disease) stage 1, GFR 90 ml/min or greater     Squamous cell carcinoma     History of squamous cell carcinoma     SK (seborrheic keratosis)     Lentigo     Family history of prostate cancer     Restless leg syndrome     Anxiety     Past Surgical History:   Procedure Laterality Date     ARTHROPLASTY KNEE  2/10/2011    ARTHROPLASTY KNEE performed by DRALING FARRELL at [facility]     COLONOSCOPY N/A 3/2/2018    Procedure: COLONOSCOPY;  colonoscopy;  Surgeon: Aris Whittaker MD;  Location: [facility]     MOHS MICROGRAPHIC PROCEDURE  july 2012    Sq. cell left helix     ORTHOPEDIC SURGERY       SURGICAL HISTORY OF -   12/2003    Cervical spine fx       Social History   Substance Use Topics     Smoking status: Former Smoker     Quit date: 3/14/1990     Smokeless tobacco: Never Used     Alcohol use Yes      Comment: occasional     Family History   Problem Relation Age of Onset     Hypertension Mother      Cerebrovascular Disease Mother      Hypertension Father      Cerebrovascular Disease Father      Diabetes Brother      Prostate Cancer Brother      Prostate Cancer Brother          Current Outpatient Prescriptions   Medication Sig Dispense Refill     ACCU-CHEK ANTONIA test strip USE ONE STRIP TO CHECK  GLUCOSE ONCE DAILY 100 each 1     albuterol (2.5 MG/3ML) 0.083% neb solution Take 1 vial (2.5 mg) by nebulization every 6 hours as needed for shortness of breath / dyspnea or wheezing 25 vial 0     albuterol (PROAIR HFA/PROVENTIL HFA/VENTOLIN HFA) 108 (90 Base) MCG/ACT Inhaler Inhale 2 puffs into the lungs every 6 hours as needed for shortness of breath / dyspnea or wheezing 1 Inhaler 0     amLODIPine (NORVASC) 5 MG tablet Take 2 tablets (10 mg) by mouth daily 180 tablet 3     aspirin 81 MG tablet Take 1 tablet by mouth daily.       atorvastatin (LIPITOR) 20 MG tablet Take 1 tablet (20 mg) by mouth daily 90 tablet 3     B Complex Vitamins (VITAMIN B COMPLEX PO) Take 1 tablet by mouth daily.       blood glucose monitoring (NO BRAND SPECIFIED) test strip 1 strip by In Vitro route daily Test 1x daily. Accu-Chek Marlene test strips 2 Box 3     CALCIUM CARBONATE-VITAMIN D 500-200 MG-UNIT OR TABS Take one tablet twice daily with food       doxycycline monohydrate 100 MG capsule Take 1 capsule (100 mg) by mouth 2 times daily for 10 days 20 capsule 0     glipiZIDE (GLIPIZIDE XL) 5 MG 24 hr tablet Take 1 tablet (5 mg) by mouth daily 90 tablet 3     lisinopril (PRINIVIL/ZESTRIL) 40 MG tablet Take 1 tablet (40 mg) by mouth 2 times daily 180 tablet 3     loratadine (CLARITIN) 10 MG tablet Take 10 mg by mouth daily       magnesium 500 MG TABS Take 500 mg by mouth daily       metFORMIN (GLUCOPHAGE) 500 MG tablet 2 tablets in the morning and one tablet at night by mouth 270 tablet 1     metoprolol tartrate (LOPRESSOR) 25 MG tablet Take 1 tablet (25 mg) by mouth 2 times daily 180 tablet 1     metoprolol tartrate (LOPRESSOR) 25 MG tablet Take 1 tablet (25 mg) by mouth 2 times daily 20 tablet 0     ORDER FOR ALLERGEN IMMUNOTHERAPY Mix #3  Tree , Weeds  Mt, White  GLY1:20 w/v, HS  0.3ml  Birch Mix GLY1:20 w/v, HS  0.3ml  [place] GLY1:20 w/v, HS  0.3ml  Elm, American GLY1:20 w/v, HS  0.3ml  [place], Black GLY1:20 w/v 0.3ml  Cockle[place]  GLY 1:20 w/v, HS 0.3ml  Lamb's Quarters GLY 1:20 w/v, ALK 0.3ml  Marshelder-Povertyweed GLY 1:20 w/v, HS 0.3ml  Nettle GLY 1:20 w/v, HS 0.3ml  Ragweed, Short GLY 1:20 w/v HS 0.3ml  Russian Thistle GLY 1:20 w/v, HS 0.3ml  Diluent: HSAqs to 5ml 5 mL PRN     order for DME Nebulizer 1 Units 0     Potassium Gluconate 595 MG CAPS Take 595 mg by mouth daily       Selenium 200 MCG TABS Take 200 mcg by mouth daily       tamsulosin (FLOMAX) 0.4 MG capsule Take 1 capsule (0.4 mg) by mouth daily 60 capsule 3     Allergies   Allergen Reactions     Penicillins Hives, Itching and Swelling     Of lips       Reviewed and updated as needed this visit by clinical staff       Reviewed and updated as needed this visit by Provider         ROS:  CONSTITUTIONAL: NEGATIVE for fever, chills, change in weight  ENT/MOUTH: NEGATIVE for ear, mouth and throat problems  RESP: NEGATIVE for significant cough or SOB  CV: NEGATIVE for chest pain, palpitations or peripheral edema    OBJECTIVE:     /70 (BP Location: Right arm, Patient Position: Sitting, Cuff Size: Adult Large)  Pulse 63  Temp 96.8  F (36  C) (Tympanic)  Wt 181 lb 12.8 oz (82.5 kg)  SpO2 98%  BMI 29.34 kg/m2  Body mass index is 29.34 kg/(m^2).  GENERAL: healthy, alert and no distress  NECK WITH PAIN ON LATERAL MOTION  NECK: no adenopathy, no asymmetry, masses, or scars and thyroid normal to palpation  RESP: lungs clear to auscultation - no rales, rhonchi or wheezes  CV: regular rate and rhythm, normal S1 S2, no S3 or S4, no murmur, click or rub, no peripheral edema and peripheral pulses strong  ABDOMEN: soft, nontender, no hepatosplenomegaly, no masses and bowel sounds normal  MS: no gross musculoskeletal defects noted, no edema        ASSESSMENT/PLAN:             1. Neck pain  Suspect facet arthritis. Xray shows old fusion.   - XR Cervical Spine 2/3 Views; Future    ASSESSMENT/PLAN:      ICD-10-CM    1. Neck pain M54.2 XR Cervical Spine 2/3 Views       Patient Instructions    1. This sounds like arthritic pain from the cervical spine.    2. These almost always resolve on own with time.    3. Ibuprofen works well for arthritis  Take 600 two times daily for two weeks and stop.    4. Come back in three weeks for review.           Kelvin Keenan MD  [facility]

## 2018-10-22 NOTE — MR AVS SNAPSHOT
After Visit Summary   10/22/2018    Sae Milligan    MRN: [id number]           Patient Information     Date Of Birth          1945        Visit Information        Provider Department      10/22/2018 12:40 PM Kelvin Keenan MD [facility]        Today's Diagnoses     Neck pain    -  1      Care Instructions    1. This sounds like arthritic pain from the cervical spine.    2. These almost always resolve on own with time.    3. Ibuprofen works well for arthritis  Take 600 two times daily for two weeks and stop.    4. Come back in three weeks for review.           Follow-ups after your visit        Who to contact     If you have questions or need follow up information about today's clinic visit or your schedule please contact [facility] directly at [phone].  Normal or non-critical lab and imaging results will be communicated to you by MyChart, letter or phone within 4 business days after the clinic has received the results. If you do not hear from us within 7 days, please contact the clinic through MyChart or phone. If you have a critical or abnormal lab result, we will notify you by phone as soon as possible.  Submit refill requests through [employer] or call your pharmacy and they will forward the refill request to us. Please allow 3 business days for your refill to be completed.          Additional Information About Your Visit        MyChart Information     [employer] gives you secure access to your electronic health record. If you see a primary care provider, you can also send messages to your care team and make appointments. If you have questions, please call your primary care clinic.  If you do not have a primary care provider, please call [phone] and they will assist you.        Care EveryWhere ID     This is your Care EveryWhere ID. This could be used by other organizations to access your [facility] medical records  [id number]        Your  Vitals Were     Pulse Temperature Pulse Oximetry BMI (Body Mass Index)          63 96.8  F (36  C) (Tympanic) 98% 29.34 kg/m2         Blood Pressure from Last 3 Encounters:   10/22/18 136/70   10/16/18 128/56   09/28/18 136/64    Weight from Last 3 Encounters:   10/22/18 181 lb 12.8 oz (82.5 kg)   10/16/18 185 lb (83.9 kg)   09/28/18 184 lb (83.5 kg)               Primary Care Provider Office Phone # Fax #    Kelvin Keenan [phone]       [address] [place], [place] [address]        Equal Access to Services     KACEY GARLAND : Yeny jay Sokristina, waradhamesda adamaadaha, qaybta kaalmada junieyawilliam, lindsey saldivar. So [facility] [phone].    ATENCIÓN: Si habla español, tiene a wang disposición servicios gratuitos de asistencia lingüística. Llame al [phone].    We comply with applicable federal civil rights laws and [place] laws. We do not discriminate on the basis of race, color, national origin, age, disability, sex, sexual orientation, or gender identity.            Thank you!     Thank you for choosing [facility]  for your care. Our goal is always to provide you with excellent care. Hearing back from our patients is one way we can continue to improve our services. Please take a few minutes to complete the written survey that you may receive in the mail after your visit with us. Thank you!             Your Updated Medication List - Protect others around you: Learn how to safely use, store and throw away your medicines at www.disposemymeds.org.          This list is accurate as of 10/22/18  1:48 PM.  Always use your most recent med list.                   Brand Name Dispense Instructions for use Diagnosis    * albuterol (2.5 MG/3ML) 0.083% neb solution     25 vial    Take 1 vial (2.5 mg) by nebulization every 6 hours as needed for shortness of breath / dyspnea or wheezing    Acute bronchitis with symptoms > 10 days       *  albuterol 108 (90 Base) MCG/ACT inhaler    PROAIR HFA/PROVENTIL HFA/VENTOLIN HFA    1 Inhaler    Inhale 2 puffs into the lungs every 6 hours as needed for shortness of breath / dyspnea or wheezing    Acute bronchitis, unspecified organism       amLODIPine 5 MG tablet    NORVASC    180 tablet    Take 2 tablets (10 mg) by mouth daily    Benign essential hypertension       aspirin 81 MG tablet      Take 1 tablet by mouth daily.        atorvastatin 20 MG tablet    LIPITOR    90 tablet    Take 1 tablet (20 mg) by mouth daily    Pure hypercholesterolemia       * blood glucose monitoring test strip    no brand specified    2 Box    1 strip by In Vitro route daily Test 1x daily. Accu-Chek Marlene test strips    Type 2 diabetes mellitus with diabetic nephropathy, with long-term current use of insulin (H)       * ACCU-CHEK MARLENE test strip   Generic drug:  blood glucose monitoring     100 each    USE ONE STRIP TO CHECK GLUCOSE ONCE DAILY    Type 2 diabetes mellitus with diabetic nephropathy, with long-term current use of insulin (H)       calcium carb 1250 mg (500 mg Sun'aq)/vitamin D 200 unit 500-200 MG-UNIT per tablet    OSCAL with D     Take one tablet twice daily with food        doxycycline monohydrate 100 MG capsule     20 capsule    Take 1 capsule (100 mg) by mouth 2 times daily for 10 days    Acute sinusitis with symptoms > 10 days       glipiZIDE 5 MG 24 hr tablet    glipiZIDE XL    90 tablet    Take 1 tablet (5 mg) by mouth daily    Type 2 diabetes mellitus with diabetic nephropathy, with long-term current use of insulin (H)       lisinopril 40 MG tablet    PRINIVIL/ZESTRIL    180 tablet    Take 1 tablet (40 mg) by mouth 2 times daily    CKD (chronic kidney disease) stage 1, GFR 90 ml/min or greater       loratadine 10 MG tablet    CLARITIN     Take 10 mg by mouth daily        magnesium 500 MG Tabs      Take 500 mg by mouth daily        metFORMIN 500 MG tablet    GLUCOPHAGE    270 tablet    2 tablets in the morning and  one tablet at night by mouth    Type 2 diabetes mellitus with diabetic nephropathy, with long-term current use of insulin (H)       * metoprolol tartrate 25 MG tablet    LOPRESSOR    20 tablet    Take 1 tablet (25 mg) by mouth 2 times daily    Essential hypertension       * metoprolol tartrate 25 MG tablet    LOPRESSOR    180 tablet    Take 1 tablet (25 mg) by mouth 2 times daily    Essential hypertension       ORDER FOR ALLERGEN IMMUNOTHERAPY 5 mL vial     5 mL    Mix #3  Tree , [place], White  GLY1:20 w/v, HS  0.3ml Birch Mix GLY1:20 w/v, HS  0.3ml [place] GLY1:20 w/v, HS  0.3ml Elm, American GLY1:20 w/v, HS  0.3ml [place], Black GLY1:20 w/v 0.3ml [place] GLY 1:20 w/v, HS 0.3ml [place] GLY 1:20 w/v, ALK 0.3ml [place]-Povertyweed GLY 1:20 w/v, HS 0.3ml Nettle GLY 1:20 w/v, HS 0.3ml Ragweed, Short GLY 1:20 w/v HS 0.3ml Russian Thistle GLY 1:20 w/v, HS 0.3ml Diluent: HSAqs to 5ml    Chronic rhinitis       order for DME     1 Units    Nebulizer    Acute bronchitis with symptoms > 10 days       Potassium Gluconate 595 MG Caps      Take 595 mg by mouth daily        Selenium 200 MCG Tabs tablet      Take 200 mcg by mouth daily        tamsulosin 0.4 MG capsule    FLOMAX    60 capsule    Take 1 capsule (0.4 mg) by mouth daily    Benign prostatic hyperplasia with nocturia       VITAMIN B COMPLEX PO      Take 1 tablet by mouth daily.        * Notice:  This list has 6 medication(s) that are the same as other medications prescribed for you. Read the directions carefully, and ask your doctor or other care provider to review them with you.

## 2018-10-22 NOTE — PATIENT INSTRUCTIONS
1. This sounds like arthritic pain from the cervical spine.    2. These almost always resolve on own with time.    3. Ibuprofen works well for arthritis  Take 600 two times daily for two weeks and stop.    4. Come back in three weeks for review.

## 2018-11-14 ENCOUNTER — OFFICE VISIT (OUTPATIENT)
Dept: FAMILY MEDICINE | Facility: CLINIC | Age: 73
End: 2018-11-14
Payer: COMMERCIAL

## 2018-11-14 VITALS
WEIGHT: 186.8 LBS | BODY MASS INDEX: 30.02 KG/M2 | HEIGHT: 66 IN | TEMPERATURE: 98.2 F | SYSTOLIC BLOOD PRESSURE: 110 MMHG | HEART RATE: 59 BPM | OXYGEN SATURATION: 98 % | DIASTOLIC BLOOD PRESSURE: 68 MMHG

## 2018-11-14 DIAGNOSIS — M70.61 TROCHANTERIC BURSITIS OF BOTH HIPS: Primary | ICD-10-CM

## 2018-11-14 DIAGNOSIS — M70.62 TROCHANTERIC BURSITIS OF BOTH HIPS: Primary | ICD-10-CM

## 2018-11-14 PROCEDURE — 99213 OFFICE O/P EST LOW 20 MIN: CPT | Performed by: FAMILY MEDICINE

## 2018-11-14 NOTE — PROGRESS NOTES
SUBJECTIVE:   Sae Milligan is a 73 year old male who presents to clinic today for the following health issues:  Neck is better and now with hip pain  Chief Complaint   Patient presents with     Neck Pain     Finished his regimen of Ibuprofen 600mg twice daily.  States he is feeling alot better. No neck pain .      Musculoskeletal Problem     States his hips have been bothering him for about 2 months. The 600mg Ibuprofen did not helped with his hip pain.  He has not tried any OTC medication for this. States when he lasy too lnog on one side it is very stif and painful too move.              Problem list and histories reviewed & adjusted, as indicated.  Additional history: as documented    Patient Active Problem List   Diagnosis     Type 2 diabetes mellitus with diabetic nephropathy (H)     Hyperlipidemia LDL goal <100     Other chronic allergic conjunctivitis     DJD (degenerative joint disease)     Health Care Home     Allergic rhinitis     CKD (chronic kidney disease) stage 1, GFR 90 ml/min or greater     Squamous cell carcinoma     History of squamous cell carcinoma     SK (seborrheic keratosis)     Lentigo     Family history of prostate cancer     Restless leg syndrome     Anxiety     Past Surgical History:   Procedure Laterality Date     ARTHROPLASTY KNEE  2/10/2011    ARTHROPLASTY KNEE performed by DARLING FARRELL at [facility]     COLONOSCOPY N/A 3/2/2018    Procedure: COLONOSCOPY;  colonoscopy;  Surgeon: Aris Whittaker MD;  Location: [facility]     MOHS MICROGRAPHIC PROCEDURE  july 2012    Sq. cell left helix     ORTHOPEDIC SURGERY       SURGICAL HISTORY OF -   12/2003    Cervical spine fx       Social History   Substance Use Topics     Smoking status: Former Smoker     Quit date: 3/14/1990     Smokeless tobacco: Never Used     Alcohol use Yes      Comment: occasional     Family History   Problem Relation Age of Onset     Hypertension Mother      Cerebrovascular Disease Mother      Hypertension Father       Cerebrovascular Disease Father      Diabetes Brother      Prostate Cancer Brother      Prostate Cancer Brother          Current Outpatient Prescriptions   Medication Sig Dispense Refill     ACCU-CHEK ANTONIA test strip USE ONE STRIP TO CHECK GLUCOSE ONCE DAILY 100 each 1     albuterol (2.5 MG/3ML) 0.083% neb solution Take 1 vial (2.5 mg) by nebulization every 6 hours as needed for shortness of breath / dyspnea or wheezing 25 vial 0     albuterol (PROAIR HFA/PROVENTIL HFA/VENTOLIN HFA) 108 (90 Base) MCG/ACT Inhaler Inhale 2 puffs into the lungs every 6 hours as needed for shortness of breath / dyspnea or wheezing 1 Inhaler 0     amLODIPine (NORVASC) 5 MG tablet Take 2 tablets (10 mg) by mouth daily 180 tablet 3     aspirin 81 MG tablet Take 1 tablet by mouth daily.       atorvastatin (LIPITOR) 20 MG tablet Take 1 tablet (20 mg) by mouth daily 90 tablet 3     B Complex Vitamins (VITAMIN B COMPLEX PO) Take 1 tablet by mouth daily.       blood glucose monitoring (NO BRAND SPECIFIED) test strip 1 strip by In Vitro route daily Test 1x daily. Accu-Chek Antonia test strips 2 Box 3     CALCIUM CARBONATE-VITAMIN D 500-200 MG-UNIT OR TABS Take one tablet twice daily with food       glipiZIDE (GLIPIZIDE XL) 5 MG 24 hr tablet Take 1 tablet (5 mg) by mouth daily 90 tablet 3     lisinopril (PRINIVIL/ZESTRIL) 40 MG tablet Take 1 tablet (40 mg) by mouth 2 times daily 180 tablet 3     magnesium 500 MG TABS Take 500 mg by mouth daily       metFORMIN (GLUCOPHAGE) 500 MG tablet 2 tablets in the morning and one tablet at night by mouth 270 tablet 1     metoprolol tartrate (LOPRESSOR) 25 MG tablet Take 1 tablet (25 mg) by mouth 2 times daily 180 tablet 1     metoprolol tartrate (LOPRESSOR) 25 MG tablet Take 1 tablet (25 mg) by mouth 2 times daily 20 tablet 0     ORDER FOR ALLERGEN IMMUNOTHERAPY Mix #3  Tree , Weeds  Mt, White  GLY1:20 w/v, HS  0.3ml  Birch Mix GLY1:20 w/v, HS  0.3ml  Mower GLY1:20 w/v, HS  0.3ml  Elm, American GLY1:20  "w/v, HS  0.3ml  [place], Black GLY1:20 w/v 0.3ml  Cocklebur GLY 1:20 w/v, HS 0.3ml  Lamb's Quarters GLY 1:20 w/v, ALK 0.3ml  Marshelder-Povertyweed GLY 1:20 w/v, HS 0.3ml  Nettle GLY 1:20 w/v, HS 0.3ml  Ragweed, Short GLY 1:20 w/v HS 0.3ml  Russian Thistle GLY 1:20 w/v, HS 0.3ml  Diluent: HSAqs to 5ml 5 mL PRN     order for DME Nebulizer 1 Units 0     Potassium Gluconate 595 MG CAPS Take 595 mg by mouth daily       Selenium 200 MCG TABS Take 200 mcg by mouth daily       tamsulosin (FLOMAX) 0.4 MG capsule Take 1 capsule (0.4 mg) by mouth daily 60 capsule 3     Allergies   Allergen Reactions     Penicillins Hives, Itching and Swelling     Of lips       Reviewed and updated as needed this visit by clinical staff       Reviewed and updated as needed this visit by Provider         ROS:  CONSTITUTIONAL: NEGATIVE for fever, chills, change in weight  ENT/MOUTH: NEGATIVE for ear, mouth and throat problems  RESP: NEGATIVE for significant cough or SOB  CV: NEGATIVE for chest pain, palpitations or peripheral edema    OBJECTIVE:     /68  Pulse 59  Temp 98.2  F (36.8  C) (Tympanic)  Ht 5' 6\" (1.676 m)  Wt 186 lb 12.8 oz (84.7 kg)  SpO2 98%  BMI 30.15 kg/m2  Body mass index is 30.15 kg/(m^2).  GENERAL: healthy, alert and no distress  NECK: no adenopathy, no asymmetry, masses, or scars and thyroid normal to palpation  RESP: lungs clear to auscultation - no rales, rhonchi or wheezes  CV: regular rate and rhythm, normal S1 S2, no S3 or S4, no murmur, click or rub, no peripheral edema and peripheral pulses strong  ABDOMEN: soft, nontender, no hepatosplenomegaly, no masses and bowel sounds normal  MS: no gross musculoskeletal defects noted, no edema  Good ROM both hips.  Some tenderness over greater trochanters      ASSESSMENT/PLAN:     ASSESSMENT/PLAN:      ICD-10-CM    1. Trochanteric bursitis of both hips M70.61     M70.62        Patient Instructions   1. The location of the hip pain suggests bursitis     2. Try a combo " of ibuprofen and mattress pad and if not better come for xray and possible injection.    3. Labs in three months.                   There are no diagnoses linked to this encounter.        Kelvin Keenan MD  [facility]

## 2018-11-14 NOTE — PATIENT INSTRUCTIONS
1. The location of the hip pain suggests bursitis     2. Try a combo of ibuprofen and mattress pad and if not better come for xray and possible injection.    3. Labs in three months.

## 2018-11-14 NOTE — MR AVS SNAPSHOT
After Visit Summary   11/14/2018    Sae Milligan    MRN: [id number]           Patient Information     Date Of Birth          1945        Visit Information        Provider Department      11/14/2018 9:20 AM Kelvin Keenan MD [facility]        Care Instructions    1. The location of the hip pain suggests bursitis     2. Try a combo of ibuprofen and mattress pad and if not better come for xray and possible injection.    3. Labs in three months.               Follow-ups after your visit        Who to contact     If you have questions or need follow up information about today's clinic visit or your schedule please contact [facility] directly at [phone].  Normal or non-critical lab and imaging results will be communicated to you by SocialSambahart, letter or phone within 4 business days after the clinic has received the results. If you do not hear from us within 7 days, please contact the clinic through ImagineOptixt or phone. If you have a critical or abnormal lab result, we will notify you by phone as soon as possible.  Submit refill requests through Coradiant or call your pharmacy and they will forward the refill request to us. Please allow 3 business days for your refill to be completed.          Additional Information About Your Visit        MyChart Information     Coradiant gives you secure access to your electronic health record. If you see a primary care provider, you can also send messages to your care team and make appointments. If you have questions, please call your primary care clinic.  If you do not have a primary care provider, please call [phone] and they will assist you.        Care EveryWhere ID     This is your Care EveryWhere ID. This could be used by other organizations to access your [facility] medical records  [id number]        Your Vitals Were     Pulse Temperature Height Pulse Oximetry BMI (Body Mass Index)       59 98.2  F (36.8  C)  "(Tympanic) 5' 6\" (1.676 m) 98% 30.15 kg/m2        Blood Pressure from Last 3 Encounters:   11/14/18 110/68   10/22/18 136/70   10/16/18 128/56    Weight from Last 3 Encounters:   11/14/18 186 lb 12.8 oz (84.7 kg)   10/22/18 181 lb 12.8 oz (82.5 kg)   10/16/18 185 lb (83.9 kg)              Today, you had the following     No orders found for display       Primary Care Provider Office Phone # Fax #    Kelvin Keenan [phone]       [address] [id number]        Equal Access to Services     KACEY GARLAND : Yeny Beverly, waradhamesda luqadaha, qaybta kaalmada junieyawilliam, lindsey bradley . So [facility] [phone].    ATENCIÓN: Si habla español, tiene a wang disposición servicios gratuitos de asistencia lingüística. [facility] [phone].    We comply with applicable federal civil rights laws and [place] laws. We do not discriminate on the basis of race, color, national origin, age, disability, sex, sexual orientation, or gender identity.            Thank you!     Thank you for choosing [facility]  for your care. Our goal is always to provide you with excellent care. Hearing back from our patients is one way we can continue to improve our services. Please take a few minutes to complete the written survey that you may receive in the mail after your visit with us. Thank you!             Your Updated Medication List - Protect others around you: Learn how to safely use, store and throw away your medicines at www.disposemymeds.org.          This list is accurate as of 11/14/18 10:02 AM.  Always use your most recent med list.                   Brand Name Dispense Instructions for use Diagnosis    * albuterol (2.5 MG/3ML) 0.083% neb solution     25 vial    Take 1 vial (2.5 mg) by nebulization every 6 hours as needed for shortness of breath / dyspnea or wheezing    Acute bronchitis with symptoms > 10 days       * albuterol 108 " (90 Base) MCG/ACT inhaler    PROAIR HFA/PROVENTIL HFA/VENTOLIN HFA    1 Inhaler    Inhale 2 puffs into the lungs every 6 hours as needed for shortness of breath / dyspnea or wheezing    Acute bronchitis, unspecified organism       amLODIPine 5 MG tablet    NORVASC    180 tablet    Take 2 tablets (10 mg) by mouth daily    Benign essential hypertension       aspirin 81 MG tablet      Take 1 tablet by mouth daily.        atorvastatin 20 MG tablet    LIPITOR    90 tablet    Take 1 tablet (20 mg) by mouth daily    Pure hypercholesterolemia       * blood glucose monitoring test strip    no brand specified    2 Box    1 strip by In Vitro route daily Test 1x daily. Accu-Chek Marlene test strips    Type 2 diabetes mellitus with diabetic nephropathy, with long-term current use of insulin (H)       * ACCU-CHEK MARLENE test strip   Generic drug:  blood glucose monitoring     100 each    USE ONE STRIP TO CHECK GLUCOSE ONCE DAILY    Type 2 diabetes mellitus with diabetic nephropathy, with long-term current use of insulin (H)       calcium carb 1250 mg (500 mg [place])/vitamin D 200 unit 500-200 MG-UNIT per tablet    OSCAL with D     Take one tablet twice daily with food        glipiZIDE 5 MG 24 hr tablet    glipiZIDE XL    90 tablet    Take 1 tablet (5 mg) by mouth daily    Type 2 diabetes mellitus with diabetic nephropathy, with long-term current use of insulin (H)       lisinopril 40 MG tablet    PRINIVIL/ZESTRIL    180 tablet    Take 1 tablet (40 mg) by mouth 2 times daily    CKD (chronic kidney disease) stage 1, GFR 90 ml/min or greater       magnesium 500 MG Tabs      Take 500 mg by mouth daily        metFORMIN 500 MG tablet    GLUCOPHAGE    270 tablet    2 tablets in the morning and one tablet at night by mouth    Type 2 diabetes mellitus with diabetic nephropathy, with long-term current use of insulin (H)       * metoprolol tartrate 25 MG tablet    LOPRESSOR    20 tablet    Take 1 tablet (25 mg) by mouth 2 times daily     Essential hypertension       * metoprolol tartrate 25 MG tablet    LOPRESSOR    180 tablet    Take 1 tablet (25 mg) by mouth 2 times daily    Essential hypertension       ORDER FOR ALLERGEN IMMUNOTHERAPY 5 mL vial     5 mL    Mix #3  Tree , Weeds Mt, White  GLY1:20 w/v, HS  0.3ml Birch Mix GLY1:20 w/v, HS  0.3ml [place] GLY1:20 w/v, HS  0.3ml Elm, American GLY1:20 w/v, HS  0.3ml Blue [place], Black GLY1:20 w/v 0.3ml Cocklebur GLY 1:20 w/v, HS 0.3ml Lamb's Quarters GLY 1:20 w/v, ALK 0.3ml Marshelder-Povertyweed GLY 1:20 w/v, HS 0.3ml Nettle GLY 1:20 w/v, HS 0.3ml Ragweed, Short GLY 1:20 w/v HS 0.3ml Russian Thistle GLY 1:20 w/v, HS 0.3ml Diluent: HSAqs to 5ml    Chronic rhinitis       order for DME     1 Units    Nebulizer    Acute bronchitis with symptoms > 10 days       Potassium Gluconate 595 MG Caps      Take 595 mg by mouth daily        Selenium 200 MCG Tabs tablet      Take 200 mcg by mouth daily        tamsulosin 0.4 MG capsule    FLOMAX    60 capsule    Take 1 capsule (0.4 mg) by mouth daily    Benign prostatic hyperplasia with nocturia       VITAMIN B COMPLEX PO      Take 1 tablet by mouth daily.        * Notice:  This list has 6 medication(s) that are the same as other medications prescribed for you. Read the directions carefully, and ask your doctor or other care provider to review them with you.

## 2019-01-01 NOTE — TELEPHONE ENCOUNTER
Form completed and faxed back.    Viviana [facility]      

## 2019-01-02 ENCOUNTER — OFFICE VISIT (OUTPATIENT)
Dept: FAMILY MEDICINE | Facility: CLINIC | Age: 74
End: 2019-01-02
Payer: MEDICARE

## 2019-01-02 VITALS
HEIGHT: 66 IN | RESPIRATION RATE: 18 BRPM | SYSTOLIC BLOOD PRESSURE: 130 MMHG | BODY MASS INDEX: 30.86 KG/M2 | DIASTOLIC BLOOD PRESSURE: 70 MMHG | HEART RATE: 62 BPM | TEMPERATURE: 97.6 F | WEIGHT: 192 LBS

## 2019-01-02 DIAGNOSIS — M70.22 OLECRANON BURSITIS OF LEFT ELBOW: Primary | ICD-10-CM

## 2019-01-02 DIAGNOSIS — M70.61 TROCHANTERIC BURSITIS OF RIGHT HIP: ICD-10-CM

## 2019-01-02 PROCEDURE — 99213 OFFICE O/P EST LOW 20 MIN: CPT | Performed by: FAMILY MEDICINE

## 2019-01-02 ASSESSMENT — PAIN SCALES - GENERAL: PAINLEVEL: MODERATE PAIN (4)

## 2019-01-02 ASSESSMENT — MIFFLIN-ST. JEOR: SCORE: 1558.66

## 2019-01-02 NOTE — NURSING NOTE
"Chief Complaint   Patient presents with     Joint Swelling     Musculoskeletal Problem       Initial /70   Pulse 62   Temp 97.6  F (36.4  C) (Tympanic)   Resp 18   Ht 1.676 m (5' 6\")   Wt 87.1 kg (192 lb)   BMI 30.99 kg/m   Estimated body mass index is 30.99 kg/m  as calculated from the following:    Height as of this encounter: 1.676 m (5' 6\").    Weight as of this encounter: 87.1 kg (192 lb).    Patient presents to the clinic using     Health Maintenance that is potentially due pending provider review:          Is there anyone who you would like to be able to receive your results?   If yes have patient fill out LORNE    "

## 2019-01-02 NOTE — PROGRESS NOTES
"  SUBJECTIVE:   Sae Milligan is a 73 year old male who presents to clinic today for the following health issues:      Concern - Left elbow swelling  Onset: weeks    Description:   Swelling left hip    Intensity: 4/10    Progression of Symptoms:  improving    Accompanying Signs & Symptoms:  Swelling but denies fever or chills    Previous history of similar problem:   no    Precipitating factors:   Worsened by: none    Alleviating factors:  Improved by: nothing     Therapies Tried and outcome: nothing  Musculoskeletal problem/pain      Duration: 1.5 months    Description  Location: rt hip    Intensity:  5/10 when sleeping on it at night    Accompanying signs and symptoms: none    History  Previous similar problem: YES  Previous evaluation:  none    Precipitating or alleviating factors:  Trauma or overuse: no   Aggravating factors include: sleeping    Therapies tried and outcome: Ibuprofen not much help      S: Sae Milligan is a 73 year old male with some R hip pain.  Off/on.  Side of hip, hurts to lie on that side    Also with  Some L elbow s welling.  No injury.  Soft on t ip of elbow.  Not hot/red.  Getting a bit better    Problem list, med list, additional histories reviewed and updated, as indicated.      No cp or sob    O:/70   Pulse 62   Temp 97.6  F (36.4  C) (Tympanic)   Resp 18   Ht 1.676 m (5' 6\")   Wt 87.1 kg (192 lb)   BMI 30.99 kg/m     Some mild tenderness R troch bursa  Has olecranon bursitis on L elbow, not hot/red/tender    A: olecranon bursitis       R trochanteric bursitis    P: elbow will heal with time, he may try soft compressive wrap.  F/u if worsening    Told him about shot on R hip.  He will think about it.  Not that bad now.  He knows to come in for that if bothering more.    F/u as above.    "

## 2019-01-14 DIAGNOSIS — E11.21 TYPE 2 DIABETES MELLITUS WITH DIABETIC NEPHROPATHY, WITH LONG-TERM CURRENT USE OF INSULIN (H): ICD-10-CM

## 2019-01-14 DIAGNOSIS — Z79.4 TYPE 2 DIABETES MELLITUS WITH DIABETIC NEPHROPATHY, WITH LONG-TERM CURRENT USE OF INSULIN (H): ICD-10-CM

## 2019-01-15 RX ORDER — GLIPIZIDE 5 MG/1
TABLET, FILM COATED, EXTENDED RELEASE ORAL
Qty: 90 TABLET | Refills: 0 | Status: SHIPPED | OUTPATIENT
Start: 2019-01-15 | End: 2019-03-13

## 2019-01-15 NOTE — TELEPHONE ENCOUNTER
"Requested Prescriptions   Pending Prescriptions Disp Refills     glipiZIDE (GLUCOTROL XL) 5 MG 24 hr tablet [Pharmacy Med Name: GLIPIZIDE ER 5 MG Tablet Extended Release 24 Hour] 90 tablet 3     Sig: TAKE 1 TABLET EVERY DAY    Sulfonylurea Agents Failed - 1/14/2019  9:26 PM       Failed - Patient has documented LDL within the past 12 mos.    Recent Labs   Lab Test 11/20/17  0927   LDL 20            Passed - Blood pressure less than 140/90 in past 6 months    BP Readings from Last 3 Encounters:   01/02/19 130/70   11/14/18 110/68   10/22/18 136/70                Passed - Patient has had a Microalbumin in the past 12 mos.    Recent Labs   Lab Test 11/20/17  0935   MICROL 145   UMALCR 203.94*            Passed - Patient has documented A1c within the specified period of time.    If HgbA1C is 8 or greater, it needs to be on file within the past 3 months.  If less than 8, must be on file within the past 6 months.     Recent Labs   Lab Test 09/28/18  0954   A1C 5.6            Passed - Medication is active on med list       Passed - Patient is age 18 or older       Passed - Patient has a recent creatinine (normal) within the past 12 mos.    Recent Labs   Lab Test 09/28/18  0954   CR 1.22            Passed - Recent (6 mo) or future (30 days) visit within the authorizing provider's specialty    Patient had office visit in the last 6 months or has a visit in the next 30 days with authorizing provider or within the authorizing provider's specialty.  See \"Patient Info\" tab in inbasket, or \"Choose Columns\" in Meds & Orders section of the refill encounter.      Last Written Prescription Date:  9/28/18  Last Fill Quantity: 90,  # refills: 3   Last office visit: 1/2/2019 with prescribing provider:     Future Office Visit:                "

## 2019-03-06 DIAGNOSIS — E11.21 TYPE 2 DIABETES MELLITUS WITH DIABETIC NEPHROPATHY, WITH LONG-TERM CURRENT USE OF INSULIN (H): ICD-10-CM

## 2019-03-06 DIAGNOSIS — N18.1 CKD (CHRONIC KIDNEY DISEASE) STAGE 1, GFR 90 ML/MIN OR GREATER: ICD-10-CM

## 2019-03-06 DIAGNOSIS — Z79.4 TYPE 2 DIABETES MELLITUS WITH DIABETIC NEPHROPATHY, WITH LONG-TERM CURRENT USE OF INSULIN (H): ICD-10-CM

## 2019-03-06 LAB
CHOLEST SERPL-MCNC: 197 MG/DL
HDLC SERPL-MCNC: 57 MG/DL
LDLC SERPL CALC-MCNC: 111 MG/DL
NONHDLC SERPL-MCNC: 140 MG/DL
TRIGL SERPL-MCNC: 147 MG/DL

## 2019-03-06 PROCEDURE — 80061 LIPID PANEL: CPT | Performed by: FAMILY MEDICINE

## 2019-03-06 PROCEDURE — 82043 UR ALBUMIN QUANTITATIVE: CPT | Performed by: FAMILY MEDICINE

## 2019-03-06 PROCEDURE — 36415 COLL VENOUS BLD VENIPUNCTURE: CPT | Performed by: FAMILY MEDICINE

## 2019-03-07 LAB
CREAT UR-MCNC: 137 MG/DL
MICROALBUMIN UR-MCNC: 429 MG/L
MICROALBUMIN/CREAT UR: 313.14 MG/G CR (ref 0–17)

## 2019-03-07 RX ORDER — LISINOPRIL 40 MG/1
TABLET ORAL
Qty: 180 TABLET | Refills: 1 | Status: SHIPPED | OUTPATIENT
Start: 2019-03-07 | End: 2019-06-03

## 2019-03-07 NOTE — TELEPHONE ENCOUNTER
"Requested Prescriptions   Pending Prescriptions Disp Refills     lisinopril (PRINIVIL/ZESTRIL) 40 MG tablet [Pharmacy Med Name: LISINOPRIL 40 MG Tablet] 180 tablet 1     Sig: TAKE 1 TABLET TWICE DAILY    ACE Inhibitors (Including Combos) Protocol Passed - 3/6/2019  6:24 PM       Passed - Blood pressure under 140/90 in past 12 months    BP Readings from Last 3 Encounters:   01/02/19 130/70   11/14/18 110/68   10/22/18 136/70                Passed - Recent (12 mo) or future (30 days) visit within the authorizing provider's specialty    Patient had office visit in the last 12 months or has a visit in the next 30 days with authorizing provider or within the authorizing provider's specialty.  See \"Patient Info\" tab in inbasket, or \"Choose Columns\" in Meds & Orders section of the refill encounter.             Passed - Medication is active on med list       Passed - Patient is age 18 or older       Passed - Normal serum creatinine on file in past 12 months    Recent Labs   Lab Test 09/28/18  0954   CR 1.22            Passed - Normal serum potassium on file in past 12 months    Recent Labs   Lab Test 09/28/18  0954   POTASSIUM 4.8             Last Written Prescription Date:  9/28/2018  Last Fill Quantity: 180,  # refills: 3   Last office visit: 1/2/2019 with prescribing provider:  1/2/2019   Future Office Visit:      "

## 2019-03-13 ENCOUNTER — OFFICE VISIT (OUTPATIENT)
Dept: FAMILY MEDICINE | Facility: CLINIC | Age: 74
End: 2019-03-13
Payer: MEDICARE

## 2019-03-13 VITALS
OXYGEN SATURATION: 99 % | DIASTOLIC BLOOD PRESSURE: 68 MMHG | SYSTOLIC BLOOD PRESSURE: 128 MMHG | TEMPERATURE: 97.7 F | RESPIRATION RATE: 16 BRPM | HEART RATE: 99 BPM | WEIGHT: 190.8 LBS | BODY MASS INDEX: 30.8 KG/M2

## 2019-03-13 DIAGNOSIS — E11.21 TYPE 2 DIABETES MELLITUS WITH DIABETIC NEPHROPATHY, WITH LONG-TERM CURRENT USE OF INSULIN (H): ICD-10-CM

## 2019-03-13 DIAGNOSIS — E11.21 TYPE 2 DIABETES MELLITUS WITH DIABETIC NEPHROPATHY, WITHOUT LONG-TERM CURRENT USE OF INSULIN (H): ICD-10-CM

## 2019-03-13 DIAGNOSIS — R35.1 BENIGN PROSTATIC HYPERPLASIA WITH NOCTURIA: ICD-10-CM

## 2019-03-13 DIAGNOSIS — M70.61 TROCHANTERIC BURSITIS OF RIGHT HIP: Primary | ICD-10-CM

## 2019-03-13 DIAGNOSIS — I10 ESSENTIAL HYPERTENSION: ICD-10-CM

## 2019-03-13 DIAGNOSIS — N40.1 BENIGN PROSTATIC HYPERPLASIA WITH NOCTURIA: ICD-10-CM

## 2019-03-13 DIAGNOSIS — Z79.4 TYPE 2 DIABETES MELLITUS WITH DIABETIC NEPHROPATHY, WITH LONG-TERM CURRENT USE OF INSULIN (H): ICD-10-CM

## 2019-03-13 PROCEDURE — 20610 DRAIN/INJ JOINT/BURSA W/O US: CPT | Mod: RT | Performed by: FAMILY MEDICINE

## 2019-03-13 PROCEDURE — 99214 OFFICE O/P EST MOD 30 MIN: CPT | Mod: 25 | Performed by: FAMILY MEDICINE

## 2019-03-13 RX ORDER — TAMSULOSIN HYDROCHLORIDE 0.4 MG/1
0.4 CAPSULE ORAL DAILY
Qty: 90 CAPSULE | Refills: 1 | Status: SHIPPED | OUTPATIENT
Start: 2019-03-13 | End: 2019-06-03

## 2019-03-13 RX ORDER — TRIAMCINOLONE ACETONIDE 40 MG/ML
40 INJECTION, SUSPENSION INTRA-ARTICULAR; INTRAMUSCULAR ONCE
Status: COMPLETED | OUTPATIENT
Start: 2019-03-13 | End: 2019-03-13

## 2019-03-13 RX ORDER — METOPROLOL TARTRATE 25 MG/1
25 TABLET, FILM COATED ORAL 2 TIMES DAILY
Qty: 180 TABLET | Refills: 1 | Status: SHIPPED | OUTPATIENT
Start: 2019-03-13 | End: 2019-06-03

## 2019-03-13 RX ADMIN — TRIAMCINOLONE ACETONIDE 40 MG: 40 INJECTION, SUSPENSION INTRA-ARTICULAR; INTRAMUSCULAR at 12:07

## 2019-03-13 NOTE — NURSING NOTE
"Chief Complaint   Patient presents with     Diabetes       Initial /68 (BP Location: Right arm, Patient Position: Chair, Cuff Size: Adult Large)   Pulse 99   Temp 97.7  F (36.5  C) (Tympanic)   Resp 16   Wt 86.5 kg (190 lb 12.8 oz)   SpO2 99%   BMI 30.80 kg/m   Estimated body mass index is 30.8 kg/m  as calculated from the following:    Height as of 1/2/19: 1.676 m (5' 6\").    Weight as of this encounter: 86.5 kg (190 lb 12.8 oz).    Patient presents to the clinic using No DME    Health Maintenance that is potentially due pending provider review:  YAMILKA Wilson MA  11:10 AM 3/13/2019  .      "

## 2019-03-13 NOTE — NURSING NOTE
Prior to injection, verified patient identity using patient's name and date of birth.  Due to injection administration, patient instructed to remain in clinic for 15 minutes  afterwards, and to report any adverse reaction to me immediately.    Bursa injection was performed.      Drug Amount Wasted:  None.  Vial/Syringe: Single dose vial  Expiration Date:  09/2020    0.5% Marcaine 4cc  Given and drawn up by Dr. Mara Wilsno MA  12:09 PM 3/13/2019

## 2019-03-13 NOTE — PROGRESS NOTES
SUBJECTIVE:   Sae Milligan is a 73 year old male who presents to clinic today for the following health issues:    1.) Hip Pain   - looking for injection today    Diabetes Follow-up    Metformin taking 2 in the AM 2 in the PM - needs sig changed on new refill.     Patient is checking blood sugars: twice daily.    Blood sugar testing frequency justification: Adjustment of medication(s)  Results are as follows:         am - 130's         Pm - 130's     Diabetic concerns: None     Symptoms of hypoglycemia (low blood sugar): none     Paresthesias (numbness or burning in feet) or sores: No     Date of last diabetic eye exam: October 8th, 2018    BP Readings from Last 2 Encounters:   01/02/19 130/70   11/14/18 110/68     Hemoglobin A1C (%)   Date Value   09/28/2018 5.6   04/24/2018 6.2 (H)     LDL Cholesterol Calculated (mg/dL)   Date Value   03/06/2019 111 (H)   11/20/2017 20       Diabetes Management Resources        S: Sae Milligan is a 73 year old male with dm2.  Fills.  A1C has been under 6, so we will stop glipizide.  Full dose metformin. Some protein in urine     Htn: stable.  Fills and labs    BpH: stable, fills on flomax    R hip pain, right on side, hurts to lie on that side.  Would like a shot    Problem list, med list, additional histories reviewed and updated, as indicated.      No cp or sob    O:/68 (BP Location: Right arm, Patient Position: Chair, Cuff Size: Adult Large)   Pulse 99   Temp 97.7  F (36.5  C) (Tympanic)   Resp 16   Wt 86.5 kg (190 lb 12.8 oz)   SpO2 99%   BMI 30.80 kg/m    GEN: Alert and oriented, in no acute distress  Pain R side over troch bursa    With his permission, 4cc marcaine with 40mg kenalog, troch bursa.  No complications    A: dm2.  Fills, stable.  Some protein in urine   Htn: stable.  Fills and labs  BpH: stable, fills on flomax  R hip pain, right on side      P: fills, labs reviewed.  Shot as above, f/u in 4-5 months, earlier prn.

## 2019-03-14 ASSESSMENT — ASTHMA QUESTIONNAIRES: ACT_TOTALSCORE: 21

## 2019-03-19 ENCOUNTER — TELEPHONE (OUTPATIENT)
Dept: FAMILY MEDICINE | Facility: CLINIC | Age: 74
End: 2019-03-19

## 2019-03-19 DIAGNOSIS — Z79.4 TYPE 2 DIABETES MELLITUS WITH DIABETIC NEPHROPATHY, WITH LONG-TERM CURRENT USE OF INSULIN (H): ICD-10-CM

## 2019-03-19 DIAGNOSIS — E11.21 TYPE 2 DIABETES MELLITUS WITH DIABETIC NEPHROPATHY, WITH LONG-TERM CURRENT USE OF INSULIN (H): ICD-10-CM

## 2019-03-19 NOTE — TELEPHONE ENCOUNTER
Reason for call:  Patient reporting a symptom    Symptom or request: Elevated BS are running 150-188. Pt was told to stop his Glipizide due to good A1C. Pt said should he start this medication back up. Please Advise.    Have you been treated for this before? Yes    Phone Number patient can be reached at:  Home number on file [phone] (home)    Best Time:  Any Time      Can we leave a detailed message on this number:  YES    Call taken on 3/19/2019 at 10:45 AM by Carmen Matos    I talked with Sae and he says that he already started back on the glipizide 5 mg one daily.  He does not need a refill.  I told him that I would let Dr Terry know this and call him back tomorrow.  This is OK to wait for Dr Mara Del Toro RN

## 2019-03-21 RX ORDER — GLIPIZIDE 5 MG/1
5 TABLET, FILM COATED, EXTENDED RELEASE ORAL DAILY
Qty: 90 TABLET | Refills: 3 | COMMUNITY
Start: 2019-03-19 | End: 2019-05-15

## 2019-03-22 ENCOUNTER — TELEPHONE (OUTPATIENT)
Dept: FAMILY MEDICINE | Facility: CLINIC | Age: 74
End: 2019-03-22

## 2019-04-03 ENCOUNTER — TELEPHONE (OUTPATIENT)
Dept: FAMILY MEDICINE | Facility: CLINIC | Age: 74
End: 2019-04-03

## 2019-04-03 ENCOUNTER — MEDICAL CORRESPONDENCE (OUTPATIENT)
Dept: HEALTH INFORMATION MANAGEMENT | Facility: CLINIC | Age: 74
End: 2019-04-03

## 2019-04-04 ENCOUNTER — MEDICAL CORRESPONDENCE (OUTPATIENT)
Dept: HEALTH INFORMATION MANAGEMENT | Facility: CLINIC | Age: 74
End: 2019-04-04

## 2019-04-05 NOTE — TELEPHONE ENCOUNTER
Form received back signed  4/5/19  Faxed Back & Sent to be scanned to this encounter  Carmen [facility] Sec

## 2019-04-17 ENCOUNTER — OFFICE VISIT (OUTPATIENT)
Dept: FAMILY MEDICINE | Facility: CLINIC | Age: 74
End: 2019-04-17
Payer: MEDICARE

## 2019-04-17 VITALS
TEMPERATURE: 98.6 F | SYSTOLIC BLOOD PRESSURE: 122 MMHG | WEIGHT: 187.4 LBS | HEIGHT: 66 IN | DIASTOLIC BLOOD PRESSURE: 64 MMHG | BODY MASS INDEX: 30.12 KG/M2 | HEART RATE: 60 BPM | RESPIRATION RATE: 18 BRPM

## 2019-04-17 DIAGNOSIS — R10.11 RUQ ABDOMINAL PAIN: Primary | ICD-10-CM

## 2019-04-17 LAB
ALBUMIN SERPL-MCNC: 3.9 G/DL (ref 3.4–5)
ALP SERPL-CCNC: 53 U/L (ref 40–150)
ALT SERPL W P-5'-P-CCNC: 28 U/L (ref 0–70)
ANION GAP SERPL CALCULATED.3IONS-SCNC: 4 MMOL/L (ref 3–14)
AST SERPL W P-5'-P-CCNC: 15 U/L (ref 0–45)
BILIRUB SERPL-MCNC: 0.4 MG/DL (ref 0.2–1.3)
BUN SERPL-MCNC: 22 MG/DL (ref 7–30)
CALCIUM SERPL-MCNC: 9 MG/DL (ref 8.5–10.1)
CHLORIDE SERPL-SCNC: 99 MMOL/L (ref 94–109)
CO2 SERPL-SCNC: 27 MMOL/L (ref 20–32)
CREAT SERPL-MCNC: 1.28 MG/DL (ref 0.66–1.25)
GFR SERPL CREATININE-BSD FRML MDRD: 55 ML/MIN/{1.73_M2}
GLUCOSE SERPL-MCNC: 149 MG/DL (ref 70–99)
POTASSIUM SERPL-SCNC: 4.5 MMOL/L (ref 3.4–5.3)
PROT SERPL-MCNC: 7.3 G/DL (ref 6.8–8.8)
SODIUM SERPL-SCNC: 130 MMOL/L (ref 133–144)

## 2019-04-17 PROCEDURE — 36415 COLL VENOUS BLD VENIPUNCTURE: CPT | Performed by: FAMILY MEDICINE

## 2019-04-17 PROCEDURE — 80053 COMPREHEN METABOLIC PANEL: CPT | Performed by: FAMILY MEDICINE

## 2019-04-17 PROCEDURE — 99213 OFFICE O/P EST LOW 20 MIN: CPT | Performed by: FAMILY MEDICINE

## 2019-04-17 ASSESSMENT — MIFFLIN-ST. JEOR: SCORE: 1537.79

## 2019-04-17 ASSESSMENT — PAIN SCALES - GENERAL: PAINLEVEL: MILD PAIN (2)

## 2019-04-17 NOTE — PROGRESS NOTES
"  SUBJECTIVE:   Sae Milligan is a 73 year old male who presents to clinic today for the following   health issues:    Musculoskeletal problem/pain      Duration: Few months     Description  Location: right side rib pain     Intensity:  mild    Accompanying signs and symptoms: none    History  Previous similar problem: no   Previous evaluation:  none    Precipitating or alleviating factors:  Trauma or overuse: YES-   Aggravating factors include: rolling, stretching    Therapies tried and outcome: nothing      S: Sae Milligan is a 73 year old male with R upper belly pain.  A couple months.  Not  Severe, notices mostly when lying on that side at night.      No pain with eating, no change in stool/urine.  No fever or malaise or wt loss or rash    Was coughing a lot with bronchitis before it  Started,  But that was a couple months ago.     Problem list, med list, additional histories reviewed and updated, as indicated.      O:/64 (BP Location: Right arm, Patient Position: Sitting, Cuff Size: Adult Large)   Pulse 60   Temp 98.6  F (37  C) (Tympanic)   Resp 18   Ht 1.676 m (5' 6\")   Wt 85 kg (187 lb 6.4 oz)   BMI 30.25 kg/m    GEN: Alert and oriented, in no acute distress  No pain over ribs  Some vague RUQ pain on exam  Belly soft otherwise    A: RUQ pain    P; lfts and ultrasound.  Not too worried, but would like to make sure things look OK.     He agrees.  F/u depends on results.        "

## 2019-04-17 NOTE — NURSING NOTE
"Chief Complaint   Patient presents with     Musculoskeletal Problem       Initial /64 (BP Location: Right arm, Patient Position: Sitting, Cuff Size: Adult Large)   Pulse 60   Temp 98.6  F (37  C) (Tympanic)   Resp 18   Ht 1.676 m (5' 6\")   Wt 85 kg (187 lb 6.4 oz)   BMI 30.25 kg/m   Estimated body mass index is 30.25 kg/m  as calculated from the following:    Height as of this encounter: 1.676 m (5' 6\").    Weight as of this encounter: 85 kg (187 lb 6.4 oz).    Patient presents to the clinic using No DME    Health Maintenance that is potentially due pending provider review:  Medicare Annual   A1C    Informed patient that these require separate appointments     Is there anyone who you would like to be able to receive your results? No  If yes have patient fill out LORNE    Noemi Boswell CMA    "

## 2019-04-22 ENCOUNTER — TELEPHONE (OUTPATIENT)
Dept: FAMILY MEDICINE | Facility: CLINIC | Age: 74
End: 2019-04-22

## 2019-04-22 NOTE — TELEPHONE ENCOUNTER
Adeilna from [facility] says Sae is scheduled for US tomorrow. Do you want a Complete US or Limited? Usually with RUQ pain they do a limited unless Dr Terry wants to check the spleen and kidneys, then they would do a complete.  Please clarify.   [phone]

## 2019-04-23 ENCOUNTER — HOSPITAL ENCOUNTER (OUTPATIENT)
Dept: ULTRASOUND IMAGING | Facility: CLINIC | Age: 74
Discharge: HOME | End: 2019-04-23
Attending: FAMILY MEDICINE | Admitting: FAMILY MEDICINE
Payer: MEDICARE

## 2019-04-23 DIAGNOSIS — R10.11 RUQ ABDOMINAL PAIN: ICD-10-CM

## 2019-04-23 PROCEDURE — 76700 US EXAM ABDOM COMPLETE: CPT

## 2019-05-15 DIAGNOSIS — Z79.4 TYPE 2 DIABETES MELLITUS WITH DIABETIC NEPHROPATHY, WITH LONG-TERM CURRENT USE OF INSULIN (H): ICD-10-CM

## 2019-05-15 DIAGNOSIS — E11.21 TYPE 2 DIABETES MELLITUS WITH DIABETIC NEPHROPATHY, WITH LONG-TERM CURRENT USE OF INSULIN (H): ICD-10-CM

## 2019-05-15 RX ORDER — GLIPIZIDE 5 MG/1
5 TABLET, FILM COATED, EXTENDED RELEASE ORAL DAILY
Qty: 90 TABLET | Refills: 1 | Status: SHIPPED | OUTPATIENT
Start: 2019-05-15 | End: 2019-06-03

## 2019-05-15 NOTE — TELEPHONE ENCOUNTER
Per Pt [employer] did not receive order for Glipizide. This looks like it was placed as Historical 3/19/19.  [facility] Station Sec

## 2019-06-03 ENCOUNTER — OFFICE VISIT (OUTPATIENT)
Dept: FAMILY MEDICINE | Facility: CLINIC | Age: 74
End: 2019-06-03
Payer: MEDICARE

## 2019-06-03 VITALS
HEIGHT: 66 IN | SYSTOLIC BLOOD PRESSURE: 120 MMHG | DIASTOLIC BLOOD PRESSURE: 66 MMHG | TEMPERATURE: 96.6 F | RESPIRATION RATE: 16 BRPM | WEIGHT: 185 LBS | BODY MASS INDEX: 29.73 KG/M2 | HEART RATE: 52 BPM

## 2019-06-03 DIAGNOSIS — Z79.4 TYPE 2 DIABETES MELLITUS WITH DIABETIC NEPHROPATHY, WITH LONG-TERM CURRENT USE OF INSULIN (H): ICD-10-CM

## 2019-06-03 DIAGNOSIS — R35.1 BENIGN PROSTATIC HYPERPLASIA WITH NOCTURIA: ICD-10-CM

## 2019-06-03 DIAGNOSIS — I10 ESSENTIAL HYPERTENSION: ICD-10-CM

## 2019-06-03 DIAGNOSIS — E11.21 TYPE 2 DIABETES MELLITUS WITH DIABETIC NEPHROPATHY, WITHOUT LONG-TERM CURRENT USE OF INSULIN (H): Primary | ICD-10-CM

## 2019-06-03 DIAGNOSIS — I10 BENIGN ESSENTIAL HYPERTENSION: ICD-10-CM

## 2019-06-03 DIAGNOSIS — E78.00 PURE HYPERCHOLESTEROLEMIA: ICD-10-CM

## 2019-06-03 DIAGNOSIS — N40.1 BENIGN PROSTATIC HYPERPLASIA WITH NOCTURIA: ICD-10-CM

## 2019-06-03 DIAGNOSIS — N18.1 CKD (CHRONIC KIDNEY DISEASE) STAGE 1, GFR 90 ML/MIN OR GREATER: ICD-10-CM

## 2019-06-03 DIAGNOSIS — E11.21 TYPE 2 DIABETES MELLITUS WITH DIABETIC NEPHROPATHY, WITH LONG-TERM CURRENT USE OF INSULIN (H): ICD-10-CM

## 2019-06-03 DIAGNOSIS — G25.81 RESTLESS LEG SYNDROME: ICD-10-CM

## 2019-06-03 LAB
HBA1C MFR BLD: 5.7 % (ref 0–5.6)
TSH SERPL DL<=0.005 MIU/L-ACNC: 1.34 MU/L (ref 0.4–4)

## 2019-06-03 PROCEDURE — 84443 ASSAY THYROID STIM HORMONE: CPT | Performed by: FAMILY MEDICINE

## 2019-06-03 PROCEDURE — 83036 HEMOGLOBIN GLYCOSYLATED A1C: CPT | Performed by: FAMILY MEDICINE

## 2019-06-03 PROCEDURE — 99214 OFFICE O/P EST MOD 30 MIN: CPT | Performed by: FAMILY MEDICINE

## 2019-06-03 PROCEDURE — 36415 COLL VENOUS BLD VENIPUNCTURE: CPT | Performed by: FAMILY MEDICINE

## 2019-06-03 RX ORDER — TAMSULOSIN HYDROCHLORIDE 0.4 MG/1
0.4 CAPSULE ORAL DAILY
Qty: 90 CAPSULE | Refills: 3 | Status: SHIPPED | OUTPATIENT
Start: 2019-06-03 | End: 2020-04-29

## 2019-06-03 RX ORDER — AMLODIPINE BESYLATE 5 MG/1
10 TABLET ORAL DAILY
Qty: 180 TABLET | Refills: 3 | Status: SHIPPED | OUTPATIENT
Start: 2019-06-03 | End: 2020-04-29

## 2019-06-03 RX ORDER — LISINOPRIL 40 MG/1
TABLET ORAL
Qty: 90 TABLET | Refills: 3 | Status: SHIPPED | OUTPATIENT
Start: 2019-06-03 | End: 2020-04-29

## 2019-06-03 RX ORDER — METOPROLOL TARTRATE 25 MG/1
25 TABLET, FILM COATED ORAL 2 TIMES DAILY
Qty: 180 TABLET | Refills: 3 | Status: SHIPPED | OUTPATIENT
Start: 2019-06-03 | End: 2020-04-29

## 2019-06-03 RX ORDER — GLIPIZIDE 5 MG/1
5 TABLET, FILM COATED, EXTENDED RELEASE ORAL DAILY
Qty: 90 TABLET | Refills: 3 | Status: SHIPPED | OUTPATIENT
Start: 2019-06-03 | End: 2020-04-29

## 2019-06-03 RX ORDER — ATORVASTATIN CALCIUM 20 MG/1
20 TABLET, FILM COATED ORAL DAILY
Qty: 90 TABLET | Refills: 3 | Status: SHIPPED | OUTPATIENT
Start: 2019-06-03 | End: 2020-06-04

## 2019-06-03 ASSESSMENT — MIFFLIN-ST. JEOR: SCORE: 1526.9

## 2019-06-03 NOTE — PROGRESS NOTES
"Subjective     Sae Milligan is a 73 year old male who presents to clinic today for the following health issues:    HPI     1.) Bone sticking up   - right shoulder  - noticed x 2 weeks ago  - no pain  - no issue with range of motion  - does not have this on left shoulder    2.) Wonder about checking thyroid today  - has been really fatigued lately no energy    S: Sae Milligan is a 73 year old male who needs his meds refilled and some labs checked    Was reading about thyroid, would like to check.  Sometimes more tired, doesn't know why.  Intermittent.  No cp or sob    Hyperlipidemia: statin.  Fills.    Htn: has been on 80mg lisinopril.  Needs to get back to 40mg.  Cr a bit high last month, t hat's likely why    BPH ;  flomax fills.  Stable    DM2: metformin and glipizide.      Problem list, med list, additional histories reviewed and updated, as indicated.      Bump on R shoulder, doesn't hurt.  \"what is it?\"       O:/66 (BP Location: Right arm, Patient Position: Chair, Cuff Size: Adult Regular)   Pulse 52   Temp 96.6  F (35.9  C) (Tympanic)   Resp 16   Ht 1.676 m (5' 6\")   Wt 83.9 kg (185 lb)   BMI 29.86 kg/m    GEN: Alert and oriented, in no acute distress  Prominent AC joint R shoulder, no pain or redness.  EXT: no edema or lesions noted in lower extremities    A: fatigue, nos.         Hyperlipidemia: statin.  Fills.  Htn: has been on 80mg lisinopril.  Needs to get back to 40mg.  BPH ;  flomax fills.  Stable  DM2: metformin and glipizide.     P: fills.  Labs.  Cut back ot 40mg on Lisinopril.  Also will stop glipizide if A1C still low.  Addendum: 5.7 A1C, but I remember we told him to stop glipizide in past, and he felt his sugars went to high, so he wants to stay on.      Weight management plan: diet/exercise       "

## 2019-06-03 NOTE — NURSING NOTE
"Chief Complaint   Patient presents with     Musculoskeletal Problem     Right Shoulder Issue       Initial /66 (BP Location: Right arm, Patient Position: Chair, Cuff Size: Adult Regular)   Pulse 52   Temp 96.6  F (35.9  C) (Tympanic)   Resp 16   Ht 1.676 m (5' 6\")   Wt 83.9 kg (185 lb)   BMI 29.86 kg/m   Estimated body mass index is 29.86 kg/m  as calculated from the following:    Height as of this encounter: 1.676 m (5' 6\").    Weight as of this encounter: 83.9 kg (185 lb).    Patient presents to the clinic using No DME    Health Maintenance that is potentially due pending provider review:  NONE    Jesenia Wilson MA  8:46 AM 6/3/2019  .      "

## 2019-06-03 NOTE — LETTER
[facility]  [address] [place] [id number]  [phone]    Magdalena 3, 2019    [facility]                                                                                                                     [address] [facility] [address]            Dear Sae,    Diabetes holding steady, normal thyroid.  I hope things are going well.    Sincerely,         Rashad Terry MD/nabeel      Results for orders placed or performed in visit on 06/03/19   TSH with free T4 reflex   Result Value Ref Range    TSH 1.34 0.40 - 4.00 mU/L   Hemoglobin A1c   Result Value Ref Range    Hemoglobin A1C 5.7 (H) 0 - 5.6 %

## 2019-06-24 ENCOUNTER — TELEPHONE (OUTPATIENT)
Dept: FAMILY MEDICINE | Facility: CLINIC | Age: 74
End: 2019-06-24

## 2019-06-24 NOTE — TELEPHONE ENCOUNTER
Pt called. States he noticed bruising on his left lower leg and ankle. States he noticed it Friday evening. It is a little tender/painful. Pt states he doesn't remember bumping it. States it is not getting worse. Offered an appointment. Pt states he will watch it for another 48 hours and if it doesn't improve or gets worse he will come in to be seen. Camila Mendenhall RN

## 2019-06-24 NOTE — TELEPHONE ENCOUNTER
Reason for call:  Patient reporting a symptom    Symptom or request: Sae says he has noticed the last few days that his foot is black and blue. It hurts a little above the toes. He doesn't remember doing anything to hurt it. He is on a baby aspirin daily. Pt concerned because he is diabetic.         Phone Number patient can be reached at:  Home number on file [phone] (home)    Best Time:  anytime    Can we leave a detailed message on this number:  YES    Call taken on 6/24/2019 at 2:43 PM by Camila Campos

## 2019-07-15 ENCOUNTER — TELEPHONE (OUTPATIENT)
Dept: FAMILY MEDICINE | Facility: CLINIC | Age: 74
End: 2019-07-15

## 2019-07-15 DIAGNOSIS — R10.11 RUQ ABDOMINAL PAIN: ICD-10-CM

## 2019-07-15 DIAGNOSIS — D64.9 ANEMIA, UNSPECIFIED TYPE: Primary | ICD-10-CM

## 2019-07-15 NOTE — TELEPHONE ENCOUNTER
left message for patient to return call.  Can tell him that surgery will call him to schedule  Chioma DelT oro RN

## 2019-07-15 NOTE — TELEPHONE ENCOUNTER
Reason for call:  Patient reporting a symptom    Symptom or request: Sae says he was unable to see Dr Terry last Friday so he went to [place]. They did a CT scan today. Sae says the doctor there says he should have an EGD and said he could either do it at [place] or have his own doctor order it and do it at [place]. He would like to have Dr Terry order it. He says he has been having R side pain and did see Dr Terry about this over a month ago       Phone Number patient can be reached at:  Home number on file [phone] (home)    Best Time:  anytime    Can we leave a detailed message on this number:  YES    Call taken on 7/15/2019 at 1:24 PM by Camila Campos

## 2019-07-15 NOTE — TELEPHONE ENCOUNTER
Pt notified and understood. Transferred him to [phone] schedule procedure  Carmen Orn Station Sec

## 2019-07-16 ENCOUNTER — ANESTHESIA EVENT (OUTPATIENT)
Dept: GASTROENTEROLOGY | Facility: CLINIC | Age: 74
End: 2019-07-16
Payer: MEDICARE

## 2019-07-16 RX ORDER — OMEPRAZOLE 40 MG/1
40 CAPSULE, DELAYED RELEASE ORAL
COMMUNITY
Start: 2019-07-12 | End: 2019-08-26

## 2019-07-16 NOTE — ANESTHESIA PREPROCEDURE EVALUATION
Anesthesia Pre-Procedure Evaluation    Patient: Sae Milligan   MRN: [id number] : 1945          Preoperative Diagnosis: anemia,   diagnostic    Procedure(s):  ESOPHAGOGASTRODUODENOSCOPY (EGD)    Past Medical History:   Diagnosis Date     Diabetes mellitus (H)      Squamous cell carcinoma      Past Surgical History:   Procedure Laterality Date     ARTHROPLASTY KNEE  2/10/2011    ARTHROPLASTY KNEE performed by DARLING FARRELL at [facility]     COLONOSCOPY N/A 3/2/2018    Procedure: COLONOSCOPY;  colonoscopy;  Surgeon: Aris Whittaker MD;  Location: [facility]     MOHS MICROGRAPHIC PROCEDURE  2012    Sq. cell left helix     ORTHOPEDIC SURGERY       SURGICAL HISTORY OF -   2003    Cervical spine fx       Anesthesia Evaluation     . Pt has had prior anesthetic.            ROS/MED HX    ENT/Pulmonary:       Neurologic:       Cardiovascular:     (+) Dyslipidemia, ----. : . . . :. .       METS/Exercise Tolerance:     Hematologic:         Musculoskeletal:   (+) arthritis,  -       GI/Hepatic:         Renal/Genitourinary:     (+) chronic renal disease, type: CRI, BPH,       Endo:     (+) type II DM .      Psychiatric:     (+) psychiatric history anxiety      Infectious Disease:         Malignancy:         Other:                          Physical Exam  Normal systems: cardiovascular, pulmonary and dental    Airway   Mallampati: I  TM distance: >3 FB  Neck ROM: full    Dental     Cardiovascular   Rhythm and rate: regular and normal      Pulmonary    breath sounds clear to auscultation            Lab Results   Component Value Date    WBC 5.2 2018    HGB 11.7 (L) 2018    HCT 33.0 (L) 2018     2018    CRP 2.3 2007    SED 4 05/15/2012     (L) 2019    POTASSIUM 4.5 2019    CHLORIDE 99 2019    CO2 27 2019    BUN 22 2019    CR 1.28 (H) 2019     (H) 2019    BETHANY 9.0 2019    ALBUMIN 3.9 2019    PROTTOTAL 7.3 2019    ALT 28  "04/17/2019    AST 15 04/17/2019    ALKPHOS 53 04/17/2019    BILITOTAL 0.4 04/17/2019    INR 1.5 (A) 03/21/2011    TSH 1.34 06/03/2019       Preop Vitals  BP Readings from Last 3 Encounters:   06/03/19 120/66   04/17/19 122/64   03/13/19 128/68    Pulse Readings from Last 3 Encounters:   06/03/19 52   04/17/19 60   03/13/19 99      Resp Readings from Last 3 Encounters:   06/03/19 16   04/17/19 18   03/13/19 16    SpO2 Readings from Last 3 Encounters:   03/13/19 99%   11/14/18 98%   10/22/18 98%      Temp Readings from Last 1 Encounters:   06/03/19 35.9  C (96.6  F) (Tympanic)    Ht Readings from Last 1 Encounters:   06/03/19 1.676 m (5' 6\")      Wt Readings from Last 1 Encounters:   06/03/19 83.9 kg (185 lb)    Estimated body mass index is 29.86 kg/m  as calculated from the following:    Height as of 6/3/19: 1.676 m (5' 6\").    Weight as of 6/3/19: 83.9 kg (185 lb).       Anesthesia Plan      History & Physical Review  History and physical reviewed and following examination; no interval change.    ASA Status:  3 .    NPO Status:  > 6 hours    Plan for MAC Reason for MAC:  Deep or markedly invasive procedure (G8)         Postoperative Care      Consents  Anesthetic plan, risks, benefits and alternatives discussed with:  Patient..                 ANGEL Ashley CRNA  "

## 2019-07-17 ENCOUNTER — ANESTHESIA (OUTPATIENT)
Dept: GASTROENTEROLOGY | Facility: CLINIC | Age: 74
End: 2019-07-17
Payer: MEDICARE

## 2019-07-19 ENCOUNTER — HOSPITAL ENCOUNTER (OUTPATIENT)
Facility: CLINIC | Age: 74
Discharge: HOME | End: 2019-07-19
Attending: SURGERY | Admitting: SURGERY
Payer: MEDICARE

## 2019-07-19 VITALS
HEART RATE: 54 BPM | BODY MASS INDEX: 29.73 KG/M2 | SYSTOLIC BLOOD PRESSURE: 119 MMHG | DIASTOLIC BLOOD PRESSURE: 65 MMHG | OXYGEN SATURATION: 94 % | WEIGHT: 185 LBS | RESPIRATION RATE: 16 BRPM | HEIGHT: 66 IN | TEMPERATURE: 97.6 F

## 2019-07-19 LAB
GLUCOSE BLDC GLUCOMTR-MCNC: 150 MG/DL (ref 70–99)
UPPER GI ENDOSCOPY: NORMAL

## 2019-07-19 PROCEDURE — 25000125 ZZHC RX 250: Performed by: SURGERY

## 2019-07-19 PROCEDURE — 25800030 ZZH RX IP 258 OP 636: Performed by: SURGERY

## 2019-07-19 PROCEDURE — 43239 EGD BIOPSY SINGLE/MULTIPLE: CPT | Performed by: SURGERY

## 2019-07-19 PROCEDURE — 37000008 ZZH ANESTHESIA TECHNICAL FEE, 1ST 30 MIN: Performed by: SURGERY

## 2019-07-19 PROCEDURE — 88305 TISSUE EXAM BY PATHOLOGIST: CPT | Performed by: SURGERY

## 2019-07-19 PROCEDURE — 88305 TISSUE EXAM BY PATHOLOGIST: CPT | Mod: 26,76 | Performed by: SURGERY

## 2019-07-19 PROCEDURE — 88342 IMHCHEM/IMCYTCHM 1ST ANTB: CPT | Performed by: SURGERY

## 2019-07-19 PROCEDURE — 82962 GLUCOSE BLOOD TEST: CPT

## 2019-07-19 PROCEDURE — 88342 IMHCHEM/IMCYTCHM 1ST ANTB: CPT | Mod: 26 | Performed by: SURGERY

## 2019-07-19 RX ORDER — FLUMAZENIL 0.1 MG/ML
0.2 INJECTION, SOLUTION INTRAVENOUS
Status: CANCELLED | OUTPATIENT
Start: 2019-07-19 | End: 2019-07-19

## 2019-07-19 RX ORDER — NALOXONE HYDROCHLORIDE 0.4 MG/ML
.1-.4 INJECTION, SOLUTION INTRAMUSCULAR; INTRAVENOUS; SUBCUTANEOUS
Status: CANCELLED | OUTPATIENT
Start: 2019-07-19 | End: 2019-07-20

## 2019-07-19 RX ORDER — SODIUM CHLORIDE, SODIUM LACTATE, POTASSIUM CHLORIDE, CALCIUM CHLORIDE 600; 310; 30; 20 MG/100ML; MG/100ML; MG/100ML; MG/100ML
INJECTION, SOLUTION INTRAVENOUS CONTINUOUS
Status: DISCONTINUED | OUTPATIENT
Start: 2019-07-19 | End: 2019-07-19 | Stop reason: HOSPADM

## 2019-07-19 RX ORDER — ONDANSETRON 2 MG/ML
4 INJECTION INTRAMUSCULAR; INTRAVENOUS
Status: DISCONTINUED | OUTPATIENT
Start: 2019-07-19 | End: 2019-07-19 | Stop reason: HOSPADM

## 2019-07-19 RX ORDER — LIDOCAINE 40 MG/G
CREAM TOPICAL
Status: DISCONTINUED | OUTPATIENT
Start: 2019-07-19 | End: 2019-07-19 | Stop reason: HOSPADM

## 2019-07-19 RX ADMIN — SODIUM CHLORIDE, POTASSIUM CHLORIDE, SODIUM LACTATE AND CALCIUM CHLORIDE: 600; 310; 30; 20 INJECTION, SOLUTION INTRAVENOUS at 09:13

## 2019-07-19 RX ADMIN — LIDOCAINE HYDROCHLORIDE 1 ML: 10 INJECTION, SOLUTION EPIDURAL; INFILTRATION; INTRACAUDAL; PERINEURAL at 09:14

## 2019-07-19 ASSESSMENT — MIFFLIN-ST. JEOR: SCORE: 1526.9

## 2019-07-19 NOTE — ANESTHESIA POSTPROCEDURE EVALUATION
Patient: Sae Milligan    Procedure(s):  ESOPHAGOGASTRODUODENOSCOPY, WITH BIOPSY    Diagnosis:anemia,   diagnostic  Diagnosis Additional Information: No value filed.    Anesthesia Type:  MAC    Note:  Anesthesia Post Evaluation    Patient location during evaluation: Bedside  Patient participation: Able to fully participate in evaluation  Level of consciousness: awake and alert  Pain management: adequate  Airway patency: patent  Cardiovascular status: acceptable  Respiratory status: acceptable  Hydration status: acceptable  PONV: none     Anesthetic complications: None          Last vitals:  Vitals:    07/19/19 0850 07/19/19 1036   BP: 152/66 119/65   Pulse:  54   Resp: 16 16   Temp: 36.4  C (97.6  F)    SpO2: 98% 94%         Electronically Signed By: ANGEL Ashley CRNA  July 19, 2019  10:49 AM

## 2019-07-19 NOTE — H&P
73 year old year old male here for upper endoscopy for anemia.  Patient has unexplained anemia.  No blood in stools, no melena.  No history of PUD or gastritis. He has a history of reflux which he is on prilosec for, well controlled now.  Last colonoscopy was 2018, negative.        Patient Active Problem List   Diagnosis     Type 2 diabetes mellitus with diabetic nephropathy (H)     Hyperlipidemia LDL goal <100     DJD (degenerative joint disease)     Health Care Home     Allergic rhinitis     CKD (chronic kidney disease) stage 1, GFR 90 ml/min or greater     Squamous cell carcinoma     Family history of prostate cancer     Restless leg syndrome     Anxiety     Benign prostatic hyperplasia with nocturia       Past Medical History:   Diagnosis Date     Diabetes mellitus (H)      Squamous cell carcinoma        Past Surgical History:   Procedure Laterality Date     ARTHROPLASTY KNEE  2/10/2011    ARTHROPLASTY KNEE performed by DARLING FARRELL at [facility]     COLONOSCOPY N/A 3/2/2018    Procedure: COLONOSCOPY;  colonoscopy;  Surgeon: Aris Whittaker MD;  Location: [facility]     MOHS MICROGRAPHIC PROCEDURE  july 2012    Sq. cell left helix     ORTHOPEDIC SURGERY       SURGICAL HISTORY OF -   12/2003    Cervical spine fx       Family History   Problem Relation Age of Onset     Hypertension Mother      Cerebrovascular Disease Mother      Hypertension Father      Cerebrovascular Disease Father      Diabetes Brother      Prostate Cancer Brother      Prostate Cancer Brother        No current outpatient medications on file.       Allergies   Allergen Reactions     Penicillins Hives, Itching and Swelling     Of lips       Pt reports that he quit smoking about 29 years ago. He has never used smokeless tobacco. He reports that he drinks alcohol. He reports that he does not use drugs.    Exam:    Awake, Alert OX3  Lungs - CTA bilaterally  CV - RRR, no murmurs, distal pulses intact  Abd - soft, non-distended, non-tender, +BS  Extr - No  cyanosis or edema; left hand missing 2nd/3rd digits    A/P 73 year old year old male in need of upper endoscopy for anemia. Risks, benefits, alternatives, and complications were discussed including the possibility of perforation and the patient agreed to proceed.    John Christianson, DO on 7/19/2019 at 10:19 AM

## 2019-07-19 NOTE — ANESTHESIA CARE TRANSFER NOTE
Patient: Sae Milligan    Procedure(s):  ESOPHAGOGASTRODUODENOSCOPY, WITH BIOPSY    Diagnosis: anemia,   diagnostic  Diagnosis Additional Information: No value filed.    Anesthesia Type:   MAC     Note:  Airway :Room Air  Patient transferred to:Phase II  Handoff Report: Identifed the Patient, Identified the Reponsible Provider, Reviewed the pertinent medical history, Discussed the surgical course, Reviewed Intra-OP anesthesia mangement and issues during anesthesia, Set expectations for post-procedure period and Allowed opportunity for questions and acknowledgement of understanding      Vitals: (Last set prior to Anesthesia Care Transfer)    CRNA VITALS  7/19/2019 1004 - 7/19/2019 1035      7/19/2019             Pulse:  61    SpO2:  97 %                Electronically Signed By: ANGEL Ashley CRNA  July 19, 2019  10:35 AM

## 2019-07-25 LAB — COPATH REPORT: NORMAL

## 2019-08-26 ENCOUNTER — OFFICE VISIT (OUTPATIENT)
Dept: FAMILY MEDICINE | Facility: CLINIC | Age: 74
End: 2019-08-26
Payer: MEDICARE

## 2019-08-26 VITALS
HEART RATE: 58 BPM | BODY MASS INDEX: 29.05 KG/M2 | WEIGHT: 180 LBS | RESPIRATION RATE: 12 BRPM | DIASTOLIC BLOOD PRESSURE: 64 MMHG | TEMPERATURE: 98.4 F | SYSTOLIC BLOOD PRESSURE: 110 MMHG

## 2019-08-26 DIAGNOSIS — E11.21 TYPE 2 DIABETES MELLITUS WITH DIABETIC NEPHROPATHY, WITHOUT LONG-TERM CURRENT USE OF INSULIN (H): ICD-10-CM

## 2019-08-26 DIAGNOSIS — K25.9 MULTIPLE GASTRIC ULCERS: Primary | ICD-10-CM

## 2019-08-26 PROCEDURE — 99214 OFFICE O/P EST MOD 30 MIN: CPT | Performed by: FAMILY MEDICINE

## 2019-08-26 RX ORDER — OMEPRAZOLE 40 MG/1
40 CAPSULE, DELAYED RELEASE ORAL DAILY
Qty: 90 CAPSULE | Refills: 3 | Status: CANCELLED | OUTPATIENT
Start: 2019-08-26

## 2019-08-26 ASSESSMENT — PAIN SCALES - GENERAL: PAINLEVEL: NO PAIN (0)

## 2019-08-26 NOTE — NURSING NOTE
"Chief Complaint   Patient presents with     Gastrophageal Reflux     follow up after scopes     /64   Pulse 58   Temp 98.4  F (36.9  C) (Tympanic)   Resp 12   Wt 81.6 kg (180 lb)   BMI 29.05 kg/m   Estimated body mass index is 29.05 kg/m  as calculated from the following:    Height as of 7/19/19: 1.676 m (5' 6\").    Weight as of this encounter: 81.6 kg (180 lb).  Patient presents to the clinic using No DME      Health Maintenance that is potentially due pending provider review:    Health Maintenance Due   Topic Date Due     MEDICARE ANNUAL WELLNESS VISIT  11/09/2010     AORTIC ANEURYSM SCREENING (SYSTEM ASSIGNED)  11/09/2010     ZOSTER IMMUNIZATION (2 of 3) 02/07/2012     DIABETIC FOOT EXAM  10/13/2016     ADVANCE CARE PLANNING  05/15/2017     ASTHMA ACTION PLAN  05/26/2018     PHQ-2  01/01/2019     FALL RISK ASSESSMENT  05/22/2019     ASTHMA CONTROL TEST  09/13/2019        Possibly completing today per provider review.        "

## 2019-08-26 NOTE — LETTER
My Asthma Action Plan    Name: Sae Milligan   YOB: 1945  Date: 8/26/2019   My doctor: Rashad Terry MD   My clinic: [facility]        My Rescue Medicine:   Albuterol inhaler (Proair/Ventolin/Proventil HFA)  2-4 puffs EVERY 4 HOURS as needed. Use a spacer if recommended by your provider.   My Asthma Severity:   Intermittent / Exercise Induced  Know your asthma triggers: cold air             GREEN ZONE   Good Control    I feel good    No cough or wheeze    Can work, sleep and play without asthma symptoms       Take your asthma control medicine every day.     1. If exercise triggers your asthma, take your rescue medication    15 minutes before exercise or sports, and    During exercise if you have asthma symptoms  2. Spacer to use with inhaler: If you have a spacer, make sure to use it with your inhaler             YELLOW ZONE Getting Worse  I have ANY of these:    I do not feel good    Cough or wheeze    Chest feels tight    Wake up at night   1. Keep taking your Green Zone medications  2. Start taking your rescue medicine:    every 20 minutes for up to 1 hour. Then every 4 hours for 24-48 hours.  3. If you stay in the Yellow Zone for more than 12-24 hours, contact your doctor.  4. If you do not return to the Green Zone in 12-24 hours or you get worse, start taking your oral steroid medicine if prescribed by your provider.           RED ZONE Medical Alert - Get Help  I have ANY of these:    I feel awful    Medicine is not helping    Breathing getting harder    Trouble walking or talking    Nose opens wide to breathe       1. Take your rescue medicine NOW  2. If your provider has prescribed an oral steroid medicine, start taking it NOW  3. Call your doctor NOW  4. If you are still in the Red Zone after 20 minutes and you have not reached your doctor:    Take your rescue medicine again and    Call 911 or go to the emergency room right away    See your regular doctor within 2 weeks of  an Emergency Room or Urgent Care visit for follow-up treatment.          Annual Reminders:  Meet with Asthma Educator,  Flu Shot in the Fall, consider Pneumonia Vaccination for patients with asthma (aged 19 and older).    Pharmacy:    [facility] - [facility] [address] [id number] - [place], [place] - [address] [facility] - [place], [place] - [address]                        Asthma Triggers  How To Control Things That Make Your Asthma Worse    Triggers are things that make your asthma worse.  Look at the list below to help you find your triggers and   what you can do about them. You can help prevent asthma flare-ups by staying away from your triggers.      Trigger                                                          What you can do   Cigarette Smoke  Tobacco smoke can make asthma worse. Do not allow smoking in your home, car or around you.  Be sure no one smokes at a child s day care or school.  If you smoke, ask your health care provider for ways to help you quit.  Ask family members to quit too.  Ask your health care provider for a referral to Quit Plan to help you quit smoking, or call 5-235-641-PLAN.     Colds, Flu, Bronchitis  These are common triggers of asthma. Wash your hands often.  Don t touch your eyes, nose or mouth.  Get a flu shot every year.     Dust Mites  These are tiny bugs that live in cloth or carpet. They are too small to see. Wash sheets and blankets in hot water every week.   Encase pillows and mattress in dust mite proof covers.  Avoid having carpet if you can. If you have carpet, vacuum weekly.   Use a dust mask and HEPA vacuum.   Pollen and Outdoor Mold  Some people are allergic to trees, grass, or weed pollen, or molds. Try to keep your windows closed.  Limit time out doors when pollen count is high.   Ask you health care provider about taking medicine during allergy season.     Animal Dander  Some people are allergic  to skin flakes, urine or saliva from pets with fur or feathers. Keep pets with fur or feathers out of your home.    If you can t keep the pet outdoors, then keep the pet out of your bedroom.  Keep the bedroom door closed.  Keep pets off cloth furniture and away from stuffed toys.     Mice, Rats, and Cockroaches  Some people are allergic to the waste from these pests.   Cover food and garbage.  Clean up spills and food crumbs.  Store grease in the refrigerator.   Keep food out of the bedroom.   Indoor Mold  This can be a trigger if your home has high moisture. Fix leaking faucets, pipes, or other sources of water.   Clean moldy surfaces.  Dehumidify basement if it is damp and smelly.   Smoke, Strong Odors, and Sprays  These can reduce air quality. Stay away from strong odors and sprays, such as perfume, powder, hair spray, paints, smoke incense, paint, cleaning products, candles and new carpet.   Exercise or Sports  Some people with asthma have this trigger. Be active!  Ask your doctor about taking medicine before sports or exercise to prevent symptoms.    Warm up for 5-10 minutes before and after sports or exercise.     Other Triggers of Asthma  Cold air:  Cover your nose and mouth with a scarf.  Sometimes laughing or crying can be a trigger.  Some medicines and food can trigger asthma.

## 2019-08-26 NOTE — PROGRESS NOTES
Subjective     Sae Milligan is a 73 year old male who presents to clinic today for the following health issues:    HPI     Refill Omeprazole  Go over last EGD  Pt doing well     S :Sae Milligan is a 73 year old male with upper egd showing ulcerations.  Non bleeding.  Put on PPI, sx improved.  Wants long term fills.  Put on 40mg to start, likely can move down to 20mg.    Diabetes: he wants to discuss medications.  Can he stop anything?  On metformin and glipizide.  A1C 5.7.  He stopped glipizide in past due to well controlled A1C, but didn't like that sugars went up to 150's at times.   Willing to try stopping again maybe.  Some nephropathy as well, but glipizide not going to affect that either way.      Problem list, med list, additional histories reviewed and updated, as indicated.      Problem list, med list, additional histories reviewed and updated, as indicated.      No cp or sob    O:/64   Pulse 58   Temp 98.4  F (36.9  C) (Tympanic)   Resp 12   Wt 81.6 kg (180 lb)   BMI 29.05 kg/m    GEN: Alert and oriented, in no acute distress    A: gastric ulcers, new finding, improved on PPI       Diabetes 2 with nephropathy, stable    P: will have him try cutting out glipizide again.  He knows sugars will run a bit higher.  Told him that's OK if A1C keeps doing well    Long term fills on PPI, 20mg omeprazole.  Maybe try getting off it in 6-12 months?  Or maybe long term use, we'll continue to discuss with him.     Back in 3 months

## 2019-08-27 ASSESSMENT — ASTHMA QUESTIONNAIRES: ACT_TOTALSCORE: 25

## 2019-10-08 ENCOUNTER — TRANSFERRED RECORDS (OUTPATIENT)
Dept: HEALTH INFORMATION MANAGEMENT | Facility: CLINIC | Age: 74
End: 2019-10-08

## 2019-11-11 ENCOUNTER — TELEPHONE (OUTPATIENT)
Dept: FAMILY MEDICINE | Facility: CLINIC | Age: 74
End: 2019-11-11

## 2019-11-11 DIAGNOSIS — E11.21 TYPE 2 DIABETES MELLITUS WITH DIABETIC NEPHROPATHY, WITHOUT LONG-TERM CURRENT USE OF INSULIN (H): Primary | ICD-10-CM

## 2019-11-11 NOTE — TELEPHONE ENCOUNTER
Reason for Call: Request for an order or referral:    Order or referral being requested: Pt is requesting Pre Orders for his Diabetic Labs and Sodium.     Date needed: 7:45 am 11/12/19    Phone number Patient can be reached at:  Home number on file [phone] (home)    Best Time:  Any Time      Can we leave a detailed message on this number?  YES    Call taken on 11/11/2019 at 8:11 AM by Carmen Matos

## 2019-11-12 DIAGNOSIS — E11.21 TYPE 2 DIABETES MELLITUS WITH DIABETIC NEPHROPATHY, WITHOUT LONG-TERM CURRENT USE OF INSULIN (H): ICD-10-CM

## 2019-11-12 LAB
ALBUMIN SERPL-MCNC: 3.9 G/DL (ref 3.4–5)
ALP SERPL-CCNC: 72 U/L (ref 40–150)
ALT SERPL W P-5'-P-CCNC: 34 U/L (ref 0–70)
ANION GAP SERPL CALCULATED.3IONS-SCNC: 6 MMOL/L (ref 3–14)
AST SERPL W P-5'-P-CCNC: 21 U/L (ref 0–45)
BILIRUB SERPL-MCNC: 0.7 MG/DL (ref 0.2–1.3)
BUN SERPL-MCNC: 18 MG/DL (ref 7–30)
CALCIUM SERPL-MCNC: 9.7 MG/DL (ref 8.5–10.1)
CHLORIDE SERPL-SCNC: 95 MMOL/L (ref 94–109)
CO2 SERPL-SCNC: 27 MMOL/L (ref 20–32)
CREAT SERPL-MCNC: 1.44 MG/DL (ref 0.66–1.25)
GFR SERPL CREATININE-BSD FRML MDRD: 48 ML/MIN/{1.73_M2}
GLUCOSE SERPL-MCNC: 200 MG/DL (ref 70–99)
HBA1C MFR BLD: 7.2 % (ref 0–5.6)
POTASSIUM SERPL-SCNC: 4.6 MMOL/L (ref 3.4–5.3)
PROT SERPL-MCNC: 7.7 G/DL (ref 6.8–8.8)
SODIUM SERPL-SCNC: 128 MMOL/L (ref 133–144)

## 2019-11-12 PROCEDURE — 80053 COMPREHEN METABOLIC PANEL: CPT | Performed by: FAMILY MEDICINE

## 2019-11-12 PROCEDURE — 83036 HEMOGLOBIN GLYCOSYLATED A1C: CPT | Performed by: FAMILY MEDICINE

## 2019-11-12 PROCEDURE — 36415 COLL VENOUS BLD VENIPUNCTURE: CPT | Performed by: FAMILY MEDICINE

## 2019-11-13 ENCOUNTER — OFFICE VISIT (OUTPATIENT)
Dept: FAMILY MEDICINE | Facility: CLINIC | Age: 74
End: 2019-11-13
Payer: MEDICARE

## 2019-11-13 VITALS
DIASTOLIC BLOOD PRESSURE: 68 MMHG | OXYGEN SATURATION: 97 % | WEIGHT: 174 LBS | HEART RATE: 72 BPM | RESPIRATION RATE: 16 BRPM | HEIGHT: 66 IN | SYSTOLIC BLOOD PRESSURE: 132 MMHG | TEMPERATURE: 98.2 F | BODY MASS INDEX: 27.97 KG/M2

## 2019-11-13 DIAGNOSIS — R10.11 RUQ ABDOMINAL PAIN: Primary | ICD-10-CM

## 2019-11-13 DIAGNOSIS — N18.30 CKD (CHRONIC KIDNEY DISEASE) STAGE 3, GFR 30-59 ML/MIN (H): ICD-10-CM

## 2019-11-13 DIAGNOSIS — E11.21 TYPE 2 DIABETES MELLITUS WITH DIABETIC NEPHROPATHY, WITHOUT LONG-TERM CURRENT USE OF INSULIN (H): ICD-10-CM

## 2019-11-13 LAB — LIPASE SERPL-CCNC: 180 U/L (ref 73–393)

## 2019-11-13 PROCEDURE — 83690 ASSAY OF LIPASE: CPT | Performed by: FAMILY MEDICINE

## 2019-11-13 PROCEDURE — 99214 OFFICE O/P EST MOD 30 MIN: CPT | Performed by: FAMILY MEDICINE

## 2019-11-13 PROCEDURE — 36415 COLL VENOUS BLD VENIPUNCTURE: CPT | Performed by: FAMILY MEDICINE

## 2019-11-13 ASSESSMENT — PAIN SCALES - GENERAL: PAINLEVEL: MILD PAIN (2)

## 2019-11-13 ASSESSMENT — MIFFLIN-ST. JEOR: SCORE: 1472.01

## 2019-11-13 NOTE — LETTER
November 18, 2019      Sae Milligan  [address] [facility] [address] [facility] [id number]        Dear ,    We are writing to inform you of your test results.    No sign of pancreas problems, which is good to see.  I hope things are going well.    Resulted Orders   Lipase   Result Value Ref Range    Lipase 180 73 - 393 U/L       If you have any questions or concerns, please call the clinic at the number listed above.       Sincerely,        Rashad Terry MD / branden

## 2019-11-13 NOTE — PROGRESS NOTES
"Chief Complaint   Patient presents with     Cold Symptoms     cold symptoms x 1 week, chills then sore throat, not appetite, fatigue, cough, chest pain when coughing     Musculoskeletal Problem     hip pain returning after last injection Jan ?       Abdominal Pain     upper right quadrant abdominal pain at times     S: Sea Milligan is a 74 year old male with cough, some wheeze.  On cruise recently, worried about bronchitis or similar    RUQ pain, intermittent.  Chronic at this point.  Workup including ultrasound, EGD, CT, labs.  Nothing found.      On PPI.    DM2: A1C back up over 7.  Will add glipizide back      Hasn't had lipase checked    Problem list, med list, additional histories reviewed and updated, as indicated.      O:/68 (BP Location: Right arm, Patient Position: Sitting, Cuff Size: Adult Large)   Pulse 72   Temp 98.2  F (36.8  C) (Tympanic)   Resp 16   Ht 1.676 m (5' 6\")   Wt 78.9 kg (174 lb)   SpO2 97%   BMI 28.08 kg/m    GEN: Alert and oriented, in no acute distress  CV: RRR, no murmur  RESP: lungs clear bilaterally, good effort  EXT: no edema or lesions noted in lower extremities  ENT: oropharynx clear, no exudate or palate/tonsil asymmetry  Neck: neck supple without mass or lymphadenopathy  Belly soft    A: abd pain, intermittent       Uri, likely viral       DM2 with nephropathy, stable       CKD    P: check lipase.  Back on glipizide.  Went over labs with him and previous studies for abd pain.    F/u 6 months, earlier prn        "

## 2019-11-19 ENCOUNTER — TELEPHONE (OUTPATIENT)
Dept: FAMILY MEDICINE | Facility: CLINIC | Age: 74
End: 2019-11-19

## 2019-11-19 NOTE — TELEPHONE ENCOUNTER
I talked with Sae.    Food and liquids high in sodium:  Soup, ham, cottage cheese, pizza, lemon/limes in water, beets, celery and carrots.    Chioma Del Toro RN

## 2019-11-19 NOTE — TELEPHONE ENCOUNTER
Reason for call:  Patient reporting a symptom    Symptom or request: Pt says he saw Dr Terry last week and he has low sodium. He wants to know what he can do to bring this up.   Phone Number patient can be reached at:  Home number on file [phone] (home)    Best Time:  He will be home until 10:00  After that it's OK to leave detailed message on recorder    Can we leave a detailed message on this number:  YES    Call taken on 11/19/2019 at 8:03 AM by Camila Campos

## 2019-12-11 ENCOUNTER — OFFICE VISIT (OUTPATIENT)
Dept: FAMILY MEDICINE | Facility: CLINIC | Age: 74
End: 2019-12-11
Payer: MEDICARE

## 2019-12-11 VITALS
RESPIRATION RATE: 16 BRPM | WEIGHT: 168 LBS | OXYGEN SATURATION: 98 % | TEMPERATURE: 97.5 F | SYSTOLIC BLOOD PRESSURE: 118 MMHG | HEART RATE: 63 BPM | BODY MASS INDEX: 27.12 KG/M2 | DIASTOLIC BLOOD PRESSURE: 54 MMHG

## 2019-12-11 DIAGNOSIS — J20.9 ACUTE BRONCHITIS, UNSPECIFIED ORGANISM: ICD-10-CM

## 2019-12-11 DIAGNOSIS — B34.9 VIRAL SYNDROME: Primary | ICD-10-CM

## 2019-12-11 DIAGNOSIS — R07.0 THROAT PAIN: ICD-10-CM

## 2019-12-11 DIAGNOSIS — J20.9 ACUTE BRONCHITIS WITH SYMPTOMS > 10 DAYS: ICD-10-CM

## 2019-12-11 LAB
DEPRECATED S PYO AG THROAT QL EIA: NORMAL
SPECIMEN SOURCE: NORMAL

## 2019-12-11 PROCEDURE — 87081 CULTURE SCREEN ONLY: CPT | Performed by: NURSE PRACTITIONER

## 2019-12-11 PROCEDURE — 87880 STREP A ASSAY W/OPTIC: CPT | Performed by: NURSE PRACTITIONER

## 2019-12-11 PROCEDURE — 99213 OFFICE O/P EST LOW 20 MIN: CPT | Performed by: NURSE PRACTITIONER

## 2019-12-11 RX ORDER — ALBUTEROL SULFATE 0.83 MG/ML
2.5 SOLUTION RESPIRATORY (INHALATION) EVERY 6 HOURS PRN
Qty: 25 VIAL | Refills: 1 | Status: SHIPPED | OUTPATIENT
Start: 2019-12-11

## 2019-12-11 RX ORDER — ALBUTEROL SULFATE 90 UG/1
2 AEROSOL, METERED RESPIRATORY (INHALATION) EVERY 6 HOURS PRN
Qty: 1 INHALER | Refills: 1 | Status: SHIPPED | OUTPATIENT
Start: 2019-12-11

## 2019-12-11 NOTE — LETTER
December 13, 2019      Sae Milligan  [address] [facility] [id number]        Dear Sae,         This letter is to inform you that the results of your recent throat culture are negative.  If you have any questions please call or make an appointment.          Sincerely,        ANGEL Bell CNP/ aure

## 2019-12-11 NOTE — NURSING NOTE
"Chief Complaint   Patient presents with     Pharyngitis       Initial /54 (BP Location: Right arm, Patient Position: Chair, Cuff Size: Adult Regular)   Pulse 63   Temp 97.5  F (36.4  C) (Tympanic)   Resp 16   Wt 76.2 kg (168 lb)   SpO2 98%   BMI 27.12 kg/m   Estimated body mass index is 27.12 kg/m  as calculated from the following:    Height as of 11/13/19: 1.676 m (5' 6\").    Weight as of this encounter: 76.2 kg (168 lb).    Patient presents to the clinic using No DME    Health Maintenance that is potentially due pending provider review:  NONE    n/a    Is there anyone who you would like to be able to receive your results? No  If yes have patient fill out LORNE  Anastasia Sanders M.A.        "

## 2019-12-11 NOTE — PROGRESS NOTES
Subjective     Sae Milligan is a 74 year old male who presents to clinic today for the following health issues:    HPI   ENT Symptoms             Symptoms: cc Present Absent Comment   Fever/Chills       Fatigue       Muscle Aches       Eye Irritation       Sneezing       Nasal Benjamín/Drg  x  Little    Sinus Pressure/Pain       Loss of smell       Dental pain       Sore Throat  x  Feels like lump in throat    Swollen Glands       Ear Pain/Fullness       Cough  x  Once in a while    Wheeze       Chest Pain       Shortness of breath       Rash       Other         Symptom duration:  2-3 weeks ago something similar and then re-started last night    Symptom severity:  same    Treatments tried:  None    Contacts:  na            Patient Active Problem List   Diagnosis     Type 2 diabetes mellitus with diabetic nephropathy (H)     Hyperlipidemia LDL goal <100     DJD (degenerative joint disease)     Health Care Home     Allergic rhinitis     Squamous cell carcinoma     Family history of prostate cancer     Restless leg syndrome     Anxiety     Benign prostatic hyperplasia with nocturia     Multiple gastric ulcers     CKD (chronic kidney disease) stage 3, GFR 30-59 ml/min (H)     Past Surgical History:   Procedure Laterality Date     ARTHROPLASTY KNEE  2/10/2011    ARTHROPLASTY KNEE performed by DARLING FARRELL at [facility]     COLONOSCOPY N/A 3/2/2018    Procedure: COLONOSCOPY;  colonoscopy;  Surgeon: Aris Whittaker MD;  Location: [facility]     ESOPHAGOSCOPY, GASTROSCOPY, DUODENOSCOPY (EGD), COMBINED N/A 2019    Procedure: ESOPHAGOGASTRODUODENOSCOPY, WITH BIOPSY;  Surgeon: John Christianson DO;  Location: [facility]     MOHS MICROGRAPHIC PROCEDURE  2012    Sq. cell left helix     ORTHOPEDIC SURGERY       SURGICAL HISTORY OF -   2003    Cervical spine fx       Social History     Tobacco Use     Smoking status: Former Smoker     Last attempt to quit: 3/14/1990     Years since quittin.7     Smokeless tobacco: Never  Used   Substance Use Topics     Alcohol use: Yes     Comment: occasional     Family History   Problem Relation Age of Onset     Hypertension Mother      Cerebrovascular Disease Mother      Hypertension Father      Cerebrovascular Disease Father      Diabetes Brother      Prostate Cancer Brother      Prostate Cancer Brother          Current Outpatient Medications   Medication Sig Dispense Refill     ACCU-CHEK ANTONIA test strip USE ONE STRIP TO CHECK GLUCOSE ONCE DAILY 100 each 1     albuterol (PROAIR HFA/PROVENTIL HFA/VENTOLIN HFA) 108 (90 Base) MCG/ACT inhaler Inhale 2 puffs into the lungs every 6 hours as needed for shortness of breath / dyspnea or wheezing 1 Inhaler 1     albuterol (PROVENTIL) (2.5 MG/3ML) 0.083% neb solution Take 1 vial (2.5 mg) by nebulization every 6 hours as needed for shortness of breath / dyspnea or wheezing 25 vial 1     amLODIPine (NORVASC) 5 MG tablet Take 2 tablets (10 mg) by mouth daily 180 tablet 3     aspirin (ASA) 81 MG tablet Take 1 tablet (81 mg) by mouth daily 30 tablet 0     aspirin 81 MG tablet Take 1 tablet by mouth daily.       atorvastatin (LIPITOR) 20 MG tablet Take 1 tablet (20 mg) by mouth daily 90 tablet 3     B Complex Vitamins (VITAMIN B COMPLEX PO) Take 1 tablet by mouth daily.       blood glucose (NO BRAND SPECIFIED) test strip 1 strip by In Vitro route daily Test 1x daily. 2 Box 3     CALCIUM CARBONATE-VITAMIN D 500-200 MG-UNIT OR TABS Take one tablet twice daily with food       glipiZIDE (GLIPIZIDE XL) 5 MG 24 hr tablet Take 1 tablet (5 mg) by mouth daily 90 tablet 3     lisinopril (PRINIVIL/ZESTRIL) 40 MG tablet One daily 90 tablet 3     magnesium 500 MG TABS Take 500 mg by mouth daily       metFORMIN (GLUCOPHAGE) 500 MG tablet 2 tablets in the morning and two tablets at night 360 tablet 3     metoprolol tartrate (LOPRESSOR) 25 MG tablet Take 1 tablet (25 mg) by mouth 2 times daily 180 tablet 3     omeprazole (PRILOSEC) 20 MG DR capsule Take 1 capsule (20 mg) by  mouth daily 90 capsule 3     order for DME Nebulizer 1 Units 0     Potassium Gluconate 595 MG CAPS Take 595 mg by mouth daily       Selenium 200 MCG TABS Take 200 mcg by mouth daily       tamsulosin (FLOMAX) 0.4 MG capsule Take 1 capsule (0.4 mg) by mouth daily 90 capsule 3     Allergies   Allergen Reactions     Penicillins Hives, Itching and Swelling     Of lips         Reviewed and updated as needed this visit by Provider         Review of Systems   ROS COMP: Constitutional, HEENT, cardiovascular, pulmonary, GI, , musculoskeletal, neuro, skin, endocrine and psych systems are negative, except as otherwise noted.      Objective    /54 (BP Location: Right arm, Patient Position: Chair, Cuff Size: Adult Regular)   Pulse 63   Temp 97.5  F (36.4  C) (Tympanic)   Resp 16   Wt 76.2 kg (168 lb)   SpO2 98%   BMI 27.12 kg/m    Body mass index is 27.12 kg/m .  Physical Exam   GENERAL: healthy, alert and no distress, nontoxic in appearance  EYES: Eyes grossly normal to inspection, PERRL and conjunctivae and sclerae normal  HENT: ear canals and TM's normal, nose and mouth without ulcers or lesions  NECK: no adenopathy, supple with full ROM  RESP: lungs clear to auscultation - no rales, rhonchi but has occasional small wheezes  CV: regular rate and rhythm, normal S1 S2, no S3 or S4, no murmur, click or rub, no peripheral edema  ABDOMEN: soft, nontender, no hepatosplenomegaly, no masses and bowel sounds normal  MS: no gross musculoskeletal defects noted, no edema  No rash    Diagnostic Test Results:  Labs reviewed in Epic  Results for orders placed or performed in visit on 12/11/19 (from the past 24 hour(s))   Rapid strep screen   Result Value Ref Range    Specimen Description Throat     Rapid Strep A Screen       NEGATIVE: No Group A streptococcal antigen detected by immunoassay, await culture report.           Assessment & Plan   Problem List Items Addressed This Visit     None      Visit Diagnoses     Viral  "syndrome    -  Primary    Acute bronchitis, unspecified organism        Relevant Medications    albuterol (PROAIR HFA/PROVENTIL HFA/VENTOLIN HFA) 108 (90 Base) MCG/ACT inhaler    Acute bronchitis with symptoms > 10 days        Relevant Medications    albuterol (PROVENTIL) (2.5 MG/3ML) 0.083% neb solution    Throat pain        Relevant Orders    Rapid strep screen (Completed)    Beta strep group A culture (Completed)             BMI:   Estimated body mass index is 27.12 kg/m  as calculated from the following:    Height as of 11/13/19: 1.676 m (5' 6\").    Weight as of this encounter: 76.2 kg (168 lb).           Patient Instructions     Increase rest and fluids. Tylenol and/or Ibuprofen for comfort. Cool mist vaporizer. If your symptoms worsen or do not resolve follow up with your primary care provider in 1 week and sooner if needed.     Mucinex 600-1200 mg 12 hour formula for ear, head and chest congestion.  It can also thin post nasal drip which can cause a cough and sore throat.     Indications for emergent return to emergency department discussed with patient, who verbalized good understanding and agreement.  Patient understands the limitations of today's evaluation.           Patient Education     Viral Syndrome (Adult)  A viral illness may cause a number of symptoms such as fever. Other symptoms depend on the part of the body that the virus affects. If it settles in your nose, throat, and lungs, it may cause cough, sore throat, congestion, runny nose, headache, earache and other ear symptoms, or shortness of breath. If it settles in your stomach and intestinal tract, it may cause nausea, vomiting, cramping, and diarrhea. Sometimes it causes generalized symptoms like \"aching all over,\" feeling tired, loss of energy, or loss of appetite.  A viral illness usually lasts anywhere from several days to several weeks, but sometimes it lasts longer. In some cases, a more serious infection can look like a viral syndrome in " the first few days of the illness. You may need another exam and additional tests to know the difference. Watch for the warning signs listed below for when to seek medical advice.  Home care  Follow these guidelines for taking care of yourself at home:    If symptoms are severe, rest at home for the first 2 to 3 days.    Stay away from cigarette smoke - both your smoke and the smoke from others.    You may use over-the-counter acetaminophen or ibuprofen for fever, muscle aching, and headache, unless another medicine was prescribed for this. If you have chronic liver or kidney disease or ever had a stomach ulcer or gastrointestinal bleeding, talk with your healthcare provider before using these medicines. No one who is younger than 18 and ill with a fever should take aspirin. It may cause severe disease or death.    Your appetite may be poor, so a light diet is fine. Avoid dehydration by drinking 8 to 12, 8-ounce glasses of fluids each day. This may include water; orange juice; lemonade; apple, grape, and cranberry juice; clear fruit drinks; electrolyte replacement and sports drinks; and decaffeinated teas and coffee. If you have been diagnosed with a kidney disease, ask your healthcare provider how much and what types of fluids you should drink to prevent dehydration. If you have kidney disease, drinking too much fluid can cause it build up in the your body and be dangerous to your health.    Over-the-counter remedies won't shorten the length of the illness but may be helpful for symptoms such as cough, sore throat, nasal and sinus congestion, or diarrhea. Don't use decongestants if you have high blood pressure.  Follow-up care  Follow up with your healthcare provider if you do not improve over the next week.  Call 911  Call 911 if any of the following occur:    Convulsion    Feeling weak, dizzy, or like you are going to faint    Chest pain, or more than mild shortness of breath  When to seek medical advice  Call  your healthcare provider right away if any of these occur:    Cough with lots of colored sputum (mucus) or blood in your sputum    Chest pain, shortness of breath, wheezing, or trouble breathing    Severe headache; face, neck, or ear pain    Severe, constant pain in the lower right side of your belly (abdominal)    Continued vomiting (can t keep liquids down)    Frequent diarrhea (more than 5 times a day); blood (red or black color) or mucus in diarrhea    Feeling weak, dizzy, or like you are going to faint    Extreme thirst    Fever of 100.4 F (38 C) or higher, or as directed by your healthcare provider  Date Last Reviewed: 4/1/2018 2000-2018 [employer]. [address] [place] [address], [place] [id number]. All rights reserved. This information is not intended as a substitute for professional medical care. Always follow your healthcare professional's instructions.           Patient Education     Self-Care for Sore Throats    Sore throats happen for many reasons, such as colds, allergies, and infections caused by viruses or bacteria. In any case, your throat becomes red and sore. Your goal for self-care is to reduce your discomfort while giving your throat a chance to heal.  Moisten and soothe your throat  Tips include the following:    Try a sip of water first thing after waking up.    Keep your throat moist by drinking 6 or more glasses of clear liquids every day.    Run a cool-air humidifier in your room overnight.    Avoid cigarette smoke.     Suck on throat lozenges, cough drops, hard candy, ice chips, or frozen fruit-juice bars. Use the sugar-free versions if your diet or medical condition requires them.  Gargle to ease irritation  Gargling every hour or 2 can ease irritation. Try gargling with 1 of these solutions:    1/4 teaspoon of salt in 1/2 cup of warm water    An over-the-counter anesthetic gargle  Use medicine for more relief  Over-the-counter medicine can reduce sore throat symptoms. Ask your  pharmacist if you have questions about which medicine to use:    Ease pain with anesthetic sprays. Aspirin or an aspirin substitute also helps. Remember, never give aspirin to anyone 18 or younger, or if you are already taking blood thinners.     For sore throats caused by allergies, try antihistamines to block the allergic reaction.    Remember: unless a sore throat is caused by a bacterial infection, antibiotics won t help you.  Prevent future sore throats  Prevention tips include the following:    Stop smoking or reduce contact with secondhand smoke. Smoke irritates the tender throat lining.    Limit contact with pets and with allergy-causing substances, such as pollen and mold.    When you re around someone with a sore throat or cold, wash your hands often to keep viruses or bacteria from spreading.    Don t strain your vocal cords.  Call your healthcare provider  Contact your healthcare provider if you have:    A temperature over 101 F (38.3 C)    White spots on the throat    Great difficulty swallowing    Trouble breathing    A skin rash    Recent exposure to someone else with strep bacteria    Severe hoarseness and swollen glands in the neck or jaw   Date Last Reviewed: 8/1/2016 2000-2018 [employer]. [address] [place] [id number]. All rights reserved. This information is not intended as a substitute for professional medical care. Always follow your healthcare professional's instructions.             Return in about 1 week (around 12/18/2019), or if symptoms worsen or fail to improve, for Follow up with your primary care provider.    ANGEL Bell [facility]

## 2019-12-11 NOTE — PATIENT INSTRUCTIONS
"Increase rest and fluids. Tylenol and/or Ibuprofen for comfort. Cool mist vaporizer. If your symptoms worsen or do not resolve follow up with your primary care provider in 1 week and sooner if needed.     Mucinex 600-1200 mg 12 hour formula for ear, head and chest congestion.  It can also thin post nasal drip which can cause a cough and sore throat.     Indications for emergent return to emergency department discussed with patient, who verbalized good understanding and agreement.  Patient understands the limitations of today's evaluation.           Patient Education     Viral Syndrome (Adult)  A viral illness may cause a number of symptoms such as fever. Other symptoms depend on the part of the body that the virus affects. If it settles in your nose, throat, and lungs, it may cause cough, sore throat, congestion, runny nose, headache, earache and other ear symptoms, or shortness of breath. If it settles in your stomach and intestinal tract, it may cause nausea, vomiting, cramping, and diarrhea. Sometimes it causes generalized symptoms like \"aching all over,\" feeling tired, loss of energy, or loss of appetite.  A viral illness usually lasts anywhere from several days to several weeks, but sometimes it lasts longer. In some cases, a more serious infection can look like a viral syndrome in the first few days of the illness. You may need another exam and additional tests to know the difference. Watch for the warning signs listed below for when to seek medical advice.  Home care  Follow these guidelines for taking care of yourself at home:    If symptoms are severe, rest at home for the first 2 to 3 days.    Stay away from cigarette smoke - both your smoke and the smoke from others.    You may use over-the-counter acetaminophen or ibuprofen for fever, muscle aching, and headache, unless another medicine was prescribed for this. If you have chronic liver or kidney disease or ever had a stomach ulcer or gastrointestinal " bleeding, talk with your healthcare provider before using these medicines. No one who is younger than 18 and ill with a fever should take aspirin. It may cause severe disease or death.    Your appetite may be poor, so a light diet is fine. Avoid dehydration by drinking 8 to 12, 8-ounce glasses of fluids each day. This may include water; orange juice; lemonade; apple, grape, and cranberry juice; clear fruit drinks; electrolyte replacement and sports drinks; and decaffeinated teas and coffee. If you have been diagnosed with a kidney disease, ask your healthcare provider how much and what types of fluids you should drink to prevent dehydration. If you have kidney disease, drinking too much fluid can cause it build up in the your body and be dangerous to your health.    Over-the-counter remedies won't shorten the length of the illness but may be helpful for symptoms such as cough, sore throat, nasal and sinus congestion, or diarrhea. Don't use decongestants if you have high blood pressure.  Follow-up care  Follow up with your healthcare provider if you do not improve over the next week.  Call 911  Call 911 if any of the following occur:    Convulsion    Feeling weak, dizzy, or like you are going to faint    Chest pain, or more than mild shortness of breath  When to seek medical advice  Call your healthcare provider right away if any of these occur:    Cough with lots of colored sputum (mucus) or blood in your sputum    Chest pain, shortness of breath, wheezing, or trouble breathing    Severe headache; face, neck, or ear pain    Severe, constant pain in the lower right side of your belly (abdominal)    Continued vomiting (can t keep liquids down)    Frequent diarrhea (more than 5 times a day); blood (red or black color) or mucus in diarrhea    Feeling weak, dizzy, or like you are going to faint    Extreme thirst    Fever of 100.4 F (38 C) or higher, or as directed by your healthcare provider  Date Last Reviewed:  4/1/2018 2000-2018 [employer]. [address] [place] [address], [place] [address]. All rights reserved. This information is not intended as a substitute for professional medical care. Always follow your healthcare professional's instructions.           Patient Education     Self-Care for Sore Throats    Sore throats happen for many reasons, such as colds, allergies, and infections caused by viruses or bacteria. In any case, your throat becomes red and sore. Your goal for self-care is to reduce your discomfort while giving your throat a chance to heal.  Moisten and soothe your throat  Tips include the following:    Try a sip of water first thing after waking up.    Keep your throat moist by drinking 6 or more glasses of clear liquids every day.    Run a cool-air humidifier in your room overnight.    Avoid cigarette smoke.     Suck on throat lozenges, cough drops, hard candy, ice chips, or frozen fruit-juice bars. Use the sugar-free versions if your diet or medical condition requires them.  Gargle to ease irritation  Gargling every hour or 2 can ease irritation. Try gargling with 1 of these solutions:    1/4 teaspoon of salt in 1/2 cup of warm water    An over-the-counter anesthetic gargle  Use medicine for more relief  Over-the-counter medicine can reduce sore throat symptoms. Ask your pharmacist if you have questions about which medicine to use:    Ease pain with anesthetic sprays. Aspirin or an aspirin substitute also helps. Remember, never give aspirin to anyone 18 or younger, or if you are already taking blood thinners.     For sore throats caused by allergies, try antihistamines to block the allergic reaction.    Remember: unless a sore throat is caused by a bacterial infection, antibiotics won t help you.  Prevent future sore throats  Prevention tips include the following:    Stop smoking or reduce contact with secondhand smoke. Smoke irritates the tender throat lining.    Limit contact with pets and  with allergy-causing substances, such as pollen and mold.    When you re around someone with a sore throat or cold, wash your hands often to keep viruses or bacteria from spreading.    Don t strain your vocal cords.  Call your healthcare provider  Contact your healthcare provider if you have:    A temperature over 101 F (38.3 C)    White spots on the throat    Great difficulty swallowing    Trouble breathing    A skin rash    Recent exposure to someone else with strep bacteria    Severe hoarseness and swollen glands in the neck or jaw   Date Last Reviewed: 8/1/2016 2000-2018 [employer]. [address] [place] [id number]. All rights reserved. This information is not intended as a substitute for professional medical care. Always follow your healthcare professional's instructions.

## 2019-12-12 LAB
BACTERIA SPEC CULT: NORMAL
SPECIMEN SOURCE: NORMAL

## 2019-12-19 ENCOUNTER — HOSPITAL ENCOUNTER (OUTPATIENT)
Dept: PHYSICAL THERAPY | Facility: CLINIC | Age: 74
Setting detail: THERAPIES SERIES
End: 2019-12-19
Attending: ORTHOPAEDIC SURGERY
Payer: MEDICARE

## 2019-12-19 PROCEDURE — 97161 PT EVAL LOW COMPLEX 20 MIN: CPT | Mod: GP

## 2019-12-19 PROCEDURE — 97110 THERAPEUTIC EXERCISES: CPT | Mod: GP

## 2019-12-19 NOTE — PROGRESS NOTES
"    Rehabilitation Services          OUTPATIENT PHYSICAL THERAPY ORTHOPEDIC EVALUATION  PLAN OF TREATMENT FOR OUTPATIENT REHABILITATION  (COMPLETE FOR INITIAL CLAIMS ONLY)  Patient's Last Name, First Name, M.I.  YOB: 1945  Sae Milligan       Provider s Name:  Marissa Van PT   Medical Record No.  [id number]   Start of Care Date:  12/19/19   Onset Date:  06/19/19   Type:     _X__PT   ___OT   ___SLP Medical Diagnosis:  R hip trochanteric bursitis     PT Diagnosis:  R hip pain with R SLing only.   Visits from SOC:  1      _________________________________________________________________________________  Plan of Treatment/Functional Goals:  strengthening, ROM, stretching           Goals  Goal Identifier: 1  Goal Description: Pt  will be able to sleep through night without waking from pain.  Target Date: 01/16/20    Goal Identifier: 2  Goal Description: Pt will increase SLS on R to 60\" for better balance and decreased risk of falls.  Target Date: 02/13/20    Goal Identifier: 3  Goal Description: Pt will be independent with his HEP for optimal functional  recovery..  Target Date: 02/13/20                                                           Therapy Frequency:  1 time/week  Predicted Duration of Therapy Intervention:  every other week for 8 weeks    MARISSA VAN PT                 I CERTIFY THE NEED FOR THESE SERVICES FURNISHED UNDER        THIS PLAN OF TREATMENT AND WHILE UNDER MY CARE .             Physician Signature               Date    X_____________________________________________________                             Certification Date From:  12/19/19   Certification Date To:  02/27/20    Referring Provider:  Dr Rivera    Initial Assessment        See Epic Evaluation Start of Care Date: 12/19/19                                                                              "

## 2019-12-19 NOTE — PROGRESS NOTES
12/19/19 1000   General Information   Type of Visit Initial OP Ortho PT Evaluation   Start of Care Date 12/19/19   Referring Physician Dr Rivera   Patient/Family Goals Statement get rid of pain   Orders Evaluate and Treat   Date of Order 12/16/19   Certification Required? Yes   Medical Diagnosis R hip trochanteric bursitis   Surgical/Medical history reviewed Yes   Body Part(s)   Body Part(s) Hip   Presentation and Etiology   Pertinent history of current problem (include personal factors and/or comorbidities that impact the POC) Pt started having R hip pain about a year ago. He received a cortizone shot that helped for 5-6 months. The pain returned. It only hurts at night when sleeping. It wakes him up every time he rolls onto his R side.   Impairments A. Pain   Functional Limitations perform activities of daily living   Symptom Location over the R greater trochanter   How/Where did it occur From insidious onset   Onset date of current episode/exacerbation 06/19/19   Chronicity Recurrent   Pain rating (0-10 point scale) Best (/10);Worst (/10)  (currently 0/10)   Best (/10) 0   Worst (/10) 5  (R SLing)   Pain quality C. Aching   Pain/symptoms are: Worse during the night   Pain/symptoms exacerbated by   (R SLing)   Pain/symptoms eased by E. Changing positions   Progression of symptoms since onset: Worsened   Current / Previous Interventions   Diagnostic Tests: X-ray   X-ray Results unremarkable   Prior Level of Function   Functional Level Prior Comment independent   Current Level of Function   Current Community Support Family/friend caregiver   Patient role/employment history F. Retired   Living environment House/townhome   Home/community accessibility drives   Current equipment-Gait/Locomotion None   Fall Risk Screen   Fall screen completed by PT   Have you fallen 2 or more times in the past year? No   Have you fallen and had an injury in the past year? No   Is patient a fall risk? No   Abuse Screen (yes response  "referral indicated)   Feels Unsafe at Home or Work/School no   Feels Threatened by Someone no   Does Anyone Try to Keep You From Having Contact with Others or Doing Things Outside Your Home? no   Physical Signs of Abuse Present no   Functional Scales   Functional Scales Other   Other Scales  LEFS 80/80   Hip Objective Findings   Side (if bilateral, select both right and left) Right   Posture slight fwd head   Gait/Locomotion normal   Balance/Proprioception (Single Leg Stance) L 29\", R 20\"   Hip ROM Comments WFL and painfree   Lumbar ROM WNL and painfree   Pelvic Screen - [place], - compression/distraction, ASIS and PSIS level   Resisted Hip Adduction Test -   Straight Leg Raise Test -   ANALILIA Test -   FADIR Test -   Palpation tender L iliacus, nontender on R, B psoas, gluts, piriformis, lumbar paraspinals, QL, greater trochanter, IT band, spring testing to SI joints and L spine   Right Hip Flexion Strength 4+/5  (L 4+/5)   Right Hip Abduction Strength 5/5  (L 5/5)   Right Hip Extension Strength 5-/5  (L 4+/5)   Right Hip IR Strength 4/5  (L 5/5)   Right Hip ER Strength 5/5  (L 5/5)   Right Knee Flexion Strength 5/5  (L 5/5)   Right Knee Extension Strength 5/5  (L 5/5)   Kelvin Flexibility Test -   Obers/ITB Flexibility -   Right Hamstring Flexibility tight at 60* SLR   Right Piriformis Flexibility -   Right Prone Quad Flexibility -   Planned Therapy Interventions   Planned Therapy Interventions strengthening;ROM;stretching   Clinical Impression   Criteria for Skilled Therapeutic Interventions Met yes, treatment indicated   PT Diagnosis R hip pain with R SLing only.   Influenced by the following impairments pain and weakness   Functional limitations due to impairments R SLing, sleeping   Clinical Presentation Stable/Uncomplicated   Clinical Presentation Rationale clinical judgement   Clinical Decision Making (Complexity) Low complexity   Therapy Frequency 1 time/week   Predicted Duration of Therapy Intervention " (days/wks) every other week for 8 weeks   Risk & Benefits of therapy have been explained Yes   Patient, Family & other staff in agreement with plan of care Yes   Education Assessment   Preferred Learning Style Listening;Demonstration;Pictures/video   Barriers to Learning No barriers   ORTHO GOALS   PT Ortho Eval Goals 1;2   Ortho Goal 1   Goal Identifier 1   Goal Description Pt  will be able to sleep through night without waking from pain.   Target Date 02/13/20   Ortho Goal 2   Goal Identifier 2   Goal Description Pt will be independent with his HEP for optimal functional  recovery..   Target Date 02/13/20   Total Evaluation Time   PT Eval, Low Complexity Minutes (01969) 30   Therapy Certification   Certification date from 12/19/19   Certification date to 02/27/20   Medical Diagnosis R hip trochanteric bursitis     Cristine Van PT

## 2019-12-31 ENCOUNTER — HOSPITAL ENCOUNTER (OUTPATIENT)
Dept: PHYSICAL THERAPY | Facility: CLINIC | Age: 74
Setting detail: THERAPIES SERIES
End: 2019-12-31
Attending: ORTHOPAEDIC SURGERY
Payer: MEDICARE

## 2019-12-31 PROCEDURE — 97110 THERAPEUTIC EXERCISES: CPT | Mod: GP

## 2020-01-07 ENCOUNTER — HOSPITAL ENCOUNTER (OUTPATIENT)
Dept: PHYSICAL THERAPY | Facility: CLINIC | Age: 75
Setting detail: THERAPIES SERIES
End: 2020-01-07
Attending: ORTHOPAEDIC SURGERY
Payer: MEDICARE

## 2020-01-07 PROCEDURE — 97110 THERAPEUTIC EXERCISES: CPT | Mod: GP

## 2020-01-07 NOTE — PROGRESS NOTES
"Outpatient Physical Therapy Discharge Note     Patient: Sae Milligan  : 1945    Beginning/End Dates of Reporting Period:  2019 to 2020    Referring Provider: Dr Rivera    Therapy Diagnosis: R hip pain with R SLing only.     Client Self Report: Pt reports no pain. He is able to sleep through the night without waking. He doesn't think he needs to continue PT. He will continue his HEP.    Objective Measurements:  Objective Measure: SLS  Details: R 60+\", L 36\"  Objective Measure: Strength  Details: B hip 5/5 (Initially B hip flex 4+/5, R hip IR 4/5, L hip ext 4+/5)          Goals:  Goal Identifier 1   Goal Description Pt  will be able to sleep through night without waking from pain.   Target Date 20   Date Met  20   Progress:     Goal Identifier 2   Goal Description Pt will increase SLS on R to 60\" for better balance and decreased risk of falls.   Target Date 20   Date Met  20   Progress:     Goal Identifier 3   Goal Description Pt will be independent with his HEP for optimal functional  recovery..   Target Date 20   Date Met  20   Progress:         Progress Toward Goals:   Progress this reporting period: Pt has made excellent progress meeting all his goals. He will continue his HEP for optimal functional recovery.          Plan:  Discharge from therapy.    Discharge:    Reason for Discharge: Patient has met all goals.      Discharge Plan: Patient to continue home program.    Cristine Van PT      "

## 2020-02-07 ENCOUNTER — OFFICE VISIT (OUTPATIENT)
Dept: FAMILY MEDICINE | Facility: CLINIC | Age: 75
End: 2020-02-07
Payer: MEDICARE

## 2020-02-07 ENCOUNTER — ANCILLARY PROCEDURE (OUTPATIENT)
Dept: GENERAL RADIOLOGY | Facility: CLINIC | Age: 75
End: 2020-02-07
Attending: NURSE PRACTITIONER
Payer: MEDICARE

## 2020-02-07 VITALS
TEMPERATURE: 97.6 F | OXYGEN SATURATION: 97 % | SYSTOLIC BLOOD PRESSURE: 116 MMHG | DIASTOLIC BLOOD PRESSURE: 60 MMHG | RESPIRATION RATE: 16 BRPM | BODY MASS INDEX: 27.48 KG/M2 | HEART RATE: 71 BPM | HEIGHT: 66 IN | WEIGHT: 171 LBS

## 2020-02-07 DIAGNOSIS — M79.671 RIGHT FOOT PAIN: Primary | ICD-10-CM

## 2020-02-07 PROCEDURE — 73630 X-RAY EXAM OF FOOT: CPT | Mod: RT

## 2020-02-07 PROCEDURE — 99213 OFFICE O/P EST LOW 20 MIN: CPT | Performed by: NURSE PRACTITIONER

## 2020-02-07 ASSESSMENT — MIFFLIN-ST. JEOR: SCORE: 1458.4

## 2020-02-07 ASSESSMENT — ENCOUNTER SYMPTOMS: CONSTITUTIONAL NEGATIVE: 1

## 2020-02-07 NOTE — PATIENT INSTRUCTIONS
Right foot pain:  1. X-ray ordered. Will call you with the final read.  2. Recommend Tylenol pain. Rest, elevated, ice/heat.  3. Wear good supportive shoes.  4. Referral to podiatry to have evaluated further.

## 2020-02-07 NOTE — PROGRESS NOTES
Subjective     Sae Milligan is a 74 year old male who presents to clinic today for the following health issues:    HPI   Joint Pain    Onset: a month or two - object fell on top of R foot     Description:   Location: Top R foot  Character: Dull ache    Intensity: moderate    Progression of Symptoms: same    Accompanying Signs & Symptoms:  Other symptoms: none    History:   Previous similar pain: no       Precipitating factors:   Trauma or overuse: YES    Alleviating factors:  Improved by: nothing    Therapies Tried and outcome: nothing    In addition: top of foot pain, dropped piece of wood on top of right foot couple months ago. Dull ache, not going away. Doesn't impede walking or toe movement. States his symptoms were improving until a couple days ago when he wore some older shoes and thinks he tied them too tight. Foot was sore more afterwards.      Patient Active Problem List   Diagnosis     Type 2 diabetes mellitus with diabetic nephropathy (H)     Hyperlipidemia LDL goal <100     DJD (degenerative joint disease)     Health Care Home     Allergic rhinitis     Squamous cell carcinoma     Family history of prostate cancer     Restless leg syndrome     Anxiety     Benign prostatic hyperplasia with nocturia     Multiple gastric ulcers     CKD (chronic kidney disease) stage 3, GFR 30-59 ml/min (H)     Past Surgical History:   Procedure Laterality Date     ARTHROPLASTY KNEE  2/10/2011    ARTHROPLASTY KNEE performed by DARLING FARRELL at [facility]     COLONOSCOPY N/A 3/2/2018    Procedure: COLONOSCOPY;  colonoscopy;  Surgeon: Aris Whittaker MD;  Location: [facility]     ESOPHAGOSCOPY, GASTROSCOPY, DUODENOSCOPY (EGD), COMBINED N/A 7/19/2019    Procedure: ESOPHAGOGASTRODUODENOSCOPY, WITH BIOPSY;  Surgeon: John Christianson DO;  Location: [facility]     MOHS MICROGRAPHIC PROCEDURE  july 2012    Sq. cell left helix     ORTHOPEDIC SURGERY       SURGICAL HISTORY OF -   12/2003    Cervical spine fx       Social History     Tobacco  Use     Smoking status: Former Smoker     Last attempt to quit: 3/14/1990     Years since quittin.9     Smokeless tobacco: Never Used   Substance Use Topics     Alcohol use: Yes     Comment: occasional     Family History   Problem Relation Age of Onset     Hypertension Mother      Cerebrovascular Disease Mother      Hypertension Father      Cerebrovascular Disease Father      Diabetes Brother      Prostate Cancer Brother      Prostate Cancer Brother          Current Outpatient Medications   Medication Sig Dispense Refill     ACCU-CHEK ANTONIA test strip USE ONE STRIP TO CHECK GLUCOSE ONCE DAILY 100 each 1     albuterol (PROAIR HFA/PROVENTIL HFA/VENTOLIN HFA) 108 (90 Base) MCG/ACT inhaler Inhale 2 puffs into the lungs every 6 hours as needed for shortness of breath / dyspnea or wheezing 1 Inhaler 1     albuterol (PROVENTIL) (2.5 MG/3ML) 0.083% neb solution Take 1 vial (2.5 mg) by nebulization every 6 hours as needed for shortness of breath / dyspnea or wheezing 25 vial 1     amLODIPine (NORVASC) 5 MG tablet Take 2 tablets (10 mg) by mouth daily 180 tablet 3     aspirin (ASA) 81 MG tablet Take 1 tablet (81 mg) by mouth daily 30 tablet 0     aspirin 81 MG tablet Take 1 tablet by mouth daily.       atorvastatin (LIPITOR) 20 MG tablet Take 1 tablet (20 mg) by mouth daily 90 tablet 3     B Complex Vitamins (VITAMIN B COMPLEX PO) Take 1 tablet by mouth daily.       blood glucose (NO BRAND SPECIFIED) test strip 1 strip by In Vitro route daily Test 1x daily. 2 Box 3     CALCIUM CARBONATE-VITAMIN D 500-200 MG-UNIT OR TABS Take one tablet twice daily with food       glipiZIDE (GLIPIZIDE XL) 5 MG 24 hr tablet Take 1 tablet (5 mg) by mouth daily 90 tablet 3     lisinopril (PRINIVIL/ZESTRIL) 40 MG tablet One daily 90 tablet 3     magnesium 500 MG TABS Take 500 mg by mouth daily       metFORMIN (GLUCOPHAGE) 500 MG tablet 2 tablets in the morning and two tablets at night 360 tablet 3     metoprolol tartrate (LOPRESSOR) 25 MG  "tablet Take 1 tablet (25 mg) by mouth 2 times daily 180 tablet 3     omeprazole (PRILOSEC) 20 MG DR capsule Take 1 capsule (20 mg) by mouth daily 90 capsule 3     order for DME Nebulizer 1 Units 0     Potassium Gluconate 595 MG CAPS Take 595 mg by mouth daily       Selenium 200 MCG TABS Take 200 mcg by mouth daily       tamsulosin (FLOMAX) 0.4 MG capsule Take 1 capsule (0.4 mg) by mouth daily 90 capsule 3     Allergies   Allergen Reactions     Penicillins Hives, Itching and Swelling     Of lips     Recent Labs   Lab Test 11/12/19  0743 06/03/19  0927 04/17/19  1403 03/06/19  0830 09/28/18  0954 05/22/18  0949  11/20/17  0927  09/29/16  0814   A1C 7.2* 5.7*  --   --  5.6  --    < > 5.8   < > 5.4   LDL  --   --   --  111*  --   --   --  20  --  30   HDL  --   --   --  57  --   --   --  63  --  68   TRIG  --   --   --  147  --   --   --  102  --  57   ALT 34  --  28  --  25  --   --   --   --   --    CR 1.44*  --  1.28*  --  1.22 1.09   < > 1.12   < >  --    GFRESTIMATED 48*  --  55*  --  58* 66   < > 64   < >  --    GFRESTBLACK 55*  --  64  --  70 80   < > 78   < >  --    POTASSIUM 4.6  --  4.5  --  4.8 4.8   < > 4.7   < >  --    TSH  --  1.34  --   --   --  1.46  --   --   --  2.82    < > = values in this interval not displayed.      BP Readings from Last 3 Encounters:   02/07/20 116/60   12/11/19 118/54   11/13/19 132/68    Wt Readings from Last 3 Encounters:   02/07/20 77.6 kg (171 lb)   12/11/19 76.2 kg (168 lb)   11/13/19 78.9 kg (174 lb)                      Reviewed and updated as needed this visit by Provider  Tobacco  Allergies  Meds  Problems  Med Hx  Surg Hx  Fam Hx         Review of Systems   Constitutional: Negative.    Musculoskeletal:        Right dorsal foot pain   Skin: Negative.             Objective    /60 (BP Location: Left arm, Patient Position: Sitting, Cuff Size: Adult Large)   Pulse 71   Temp 97.6  F (36.4  C) (Tympanic)   Resp 16   Ht 1.676 m (5' 6\")   Wt 77.6 kg (171 lb)   " SpO2 97%   BMI 27.60 kg/m    Body mass index is 27.6 kg/m .  Physical Exam  Vitals signs and nursing note reviewed.   Constitutional:       Appearance: Normal appearance. He is not ill-appearing or toxic-appearing.   Musculoskeletal:      Right foot: Normal range of motion. No deformity or foot drop.        Feet:    Feet:      Right foot:      Skin integrity: Skin integrity normal.   Skin:     General: Skin is warm and dry.      Findings: No bruising, erythema, lesion or rash.   Neurological:      Mental Status: He is alert.      Motor: No weakness.      Gait: Gait normal.            Diagnostic Test Results:  Labs reviewed in Epic  Results for orders placed or performed in visit on 02/07/20 (from the past 24 hour(s))   XR Foot Right G/E 3 Views    Narrative    RIGHT FOOT THREE OR MORE VIEWS  2/7/2020 11:34 AM     HISTORY: Top of right foot pain post dropping wood on it 2 months ago.  Right foot pain.      Impression    IMPRESSION: Bunion and hallux valgus. Forefoot vascular calcification.  Small plantar calcaneal spur.    TAYLER SANTILLAN MD           Assessment & Plan     1. Right foot pain  No fracture noted; small heel spur and bunion noted, but are not reason he is having pain. Recommend Tylenol for pain. Rest, elevate, ice/heat as needed. Advised he not wear the shoes that bothered him and wear his newer more supportive tennis shoes at all times. Referral sent to podiatry. He is considering seeing if symptoms improve with wearing better shoes. He will schedule with podiatry if symptoms persist.    - XR Foot Right G/E 3 Views  - [facility]  Referral       See Patient Instructions    Return in about 1 week (around 2/14/2020), or if symptoms worsen or fail to improve.    ANGEL Crawford [facility]    No ibuprofen due to kidney function.

## 2020-02-10 ENCOUNTER — HEALTH MAINTENANCE LETTER (OUTPATIENT)
Age: 75
End: 2020-02-10

## 2020-02-27 ENCOUNTER — TELEPHONE (OUTPATIENT)
Dept: FAMILY MEDICINE | Facility: CLINIC | Age: 75
End: 2020-02-27

## 2020-02-27 NOTE — TELEPHONE ENCOUNTER
Reason for Call:  Form, our goal is to have forms completed with 72 hours, however, some forms may require a visit or additional information.    Type of letter, form or note:  medical    Who is the form from?: [employer] (if other please explain)    Where did the form come from: form was faxed in    What clinic location was the form placed at?: [place]    Where the form was placed: Given to physician    What number is listed as a contact on the form?: Fax - [phone]       Additional comments:     Call taken on 2/27/2020 at 1:11 PM by Mercy Watson

## 2020-03-02 NOTE — TELEPHONE ENCOUNTER
Form received back signed  3/2/20  Faxed Back & Sent to be scanned to this encounter  Carmen [facility] [facility] Sec

## 2020-04-28 DIAGNOSIS — N18.1 CKD (CHRONIC KIDNEY DISEASE) STAGE 1, GFR 90 ML/MIN OR GREATER: ICD-10-CM

## 2020-04-28 DIAGNOSIS — I10 ESSENTIAL HYPERTENSION: ICD-10-CM

## 2020-04-28 DIAGNOSIS — E11.21 TYPE 2 DIABETES MELLITUS WITH DIABETIC NEPHROPATHY, WITH LONG-TERM CURRENT USE OF INSULIN (H): ICD-10-CM

## 2020-04-28 DIAGNOSIS — Z79.4 TYPE 2 DIABETES MELLITUS WITH DIABETIC NEPHROPATHY, WITH LONG-TERM CURRENT USE OF INSULIN (H): ICD-10-CM

## 2020-04-28 DIAGNOSIS — N40.1 BENIGN PROSTATIC HYPERPLASIA WITH NOCTURIA: ICD-10-CM

## 2020-04-28 DIAGNOSIS — I10 BENIGN ESSENTIAL HYPERTENSION: ICD-10-CM

## 2020-04-28 DIAGNOSIS — R35.1 BENIGN PROSTATIC HYPERPLASIA WITH NOCTURIA: ICD-10-CM

## 2020-04-29 RX ORDER — GLIPIZIDE 5 MG/1
5 TABLET, FILM COATED, EXTENDED RELEASE ORAL DAILY
Qty: 90 TABLET | Refills: 3 | Status: SHIPPED | OUTPATIENT
Start: 2020-04-29 | End: 2021-01-29

## 2020-04-29 RX ORDER — TAMSULOSIN HYDROCHLORIDE 0.4 MG/1
0.4 CAPSULE ORAL DAILY
Qty: 90 CAPSULE | Refills: 3 | Status: SHIPPED | OUTPATIENT
Start: 2020-04-29 | End: 2021-01-29

## 2020-04-29 RX ORDER — AMLODIPINE BESYLATE 5 MG/1
10 TABLET ORAL DAILY
Qty: 180 TABLET | Refills: 3 | Status: SHIPPED | OUTPATIENT
Start: 2020-04-29 | End: 2021-01-29

## 2020-04-29 RX ORDER — METOPROLOL TARTRATE 25 MG/1
25 TABLET, FILM COATED ORAL 2 TIMES DAILY
Qty: 180 TABLET | Refills: 3 | Status: SHIPPED | OUTPATIENT
Start: 2020-04-29 | End: 2021-01-29

## 2020-04-29 RX ORDER — LISINOPRIL 40 MG/1
TABLET ORAL
Qty: 90 TABLET | Refills: 3 | Status: SHIPPED | OUTPATIENT
Start: 2020-04-29 | End: 2021-01-29

## 2020-05-18 ENCOUNTER — VIRTUAL VISIT (OUTPATIENT)
Dept: ALLERGY | Facility: CLINIC | Age: 75
End: 2020-05-18
Payer: MEDICARE

## 2020-05-18 ENCOUNTER — TELEPHONE (OUTPATIENT)
Dept: ALLERGY | Facility: CLINIC | Age: 75
End: 2020-05-18

## 2020-05-18 DIAGNOSIS — J30.89 SEASONAL ALLERGIC RHINITIS DUE TO OTHER ALLERGIC TRIGGER: Primary | ICD-10-CM

## 2020-05-18 DIAGNOSIS — J45.20 MILD INTERMITTENT ASTHMA WITHOUT COMPLICATION: ICD-10-CM

## 2020-05-18 PROCEDURE — 99442: CPT | Performed by: ALLERGY & IMMUNOLOGY

## 2020-05-18 RX ORDER — FLUTICASONE PROPIONATE 50 MCG
1-2 SPRAY, SUSPENSION (ML) NASAL DAILY
Qty: 16 G | Refills: 3 | Status: SHIPPED | OUTPATIENT
Start: 2020-05-18

## 2020-05-18 ASSESSMENT — ENCOUNTER SYMPTOMS
EYE REDNESS: 0
JOINT SWELLING: 0
MYALGIAS: 0
VOMITING: 0
FEVER: 0
ADENOPATHY: 0
CHEST TIGHTNESS: 0
NAUSEA: 0
DIARRHEA: 0
SINUS PRESSURE: 0
COUGH: 0
EYE ITCHING: 1
FACIAL SWELLING: 0
RHINORRHEA: 0
FATIGUE: 1
WHEEZING: 0
EYE DISCHARGE: 0
ARTHRALGIAS: 0
HEADACHES: 0
ACTIVITY CHANGE: 0
SHORTNESS OF BREATH: 0

## 2020-05-18 NOTE — PROGRESS NOTES
"Sae Milligan is a 74 year old male who is being evaluated via a billable telephone visit.      The patient has been notified of following:     \"This telephone visit will be conducted via a call between you and your physician/provider. We have found that certain health care needs can be provided without the need for a physical exam.  This service lets us provide the care you need with a short phone conversation.  If a prescription is necessary we can send it directly to your pharmacy.  If lab work is needed we can place an order for that and you can then stop by our lab to have the test done at a later time.    Telephone visits are billed at different rates depending on your insurance coverage. During this emergency period, for some insurers they may be billed the same as an in-person visit.  Please reach out to your insurance provider with any questions.    If during the course of the call the physician/provider feels a telephone visit is not appropriate, you will not be charged for this service.\"    Patient has given verbal consent for Telephone visit?  Yes    What phone number would you like to be contacted at? [phone]     How would you like to obtain your AVS? Mail a copy      This is a follow up telephone visit. Sae Milligan  is a 74 year old male a history of allergic rhinitis and intermittent asthma.    He called us last week to set up an appointment for asthma evaluation; however, since then, his symptoms resolved.    The patient states that he has a history of asthma since his 60s.  It is manifested by difficulty breathing, cough, and wheezing, triggered by spring pollen, cold air, dogs, horses, and dry air.  Albuterol seems to help immediately.  Usually, he uses 2 puffs on as-needed basis.  Approximately 10 days ago, he developed more difficulty breathing and had to use albuterol for several days in a row.  Since then, his symptoms self resolved.  He denies any history of hospitalizations for asthma.  " No prednisone for the last 12 months.  No ED/urgent care visits for the last 12 months.  He thinks that he had asthma symptoms as a child when he was going into a chicken house, but then his symptoms resolved and never bothered him until his 60s.  He used to smoke for 30 years, 1 pack/day.  He quit 30 years ago.    He does have history of allergic rhinitis.  Was on allergen immunotherapy for multiple years.  SPT for aeroallergens performed in 2009 showed sensitivity to the cat, dog, dust mites, tree, grass, and weed pollen.  He found allergen immunotherapy partially helpful.  He still has mild nasal congestion, postnasal drip, and ocular itchiness in April-May.  Over-the-counter antihistamines are partially helpful.  He does not use any nasal sprays.    Patient Active Problem List   Diagnosis     Type 2 diabetes mellitus with diabetic nephropathy (H)     Hyperlipidemia LDL goal <100     DJD (degenerative joint disease)     Health Care Home     Allergic rhinitis     Squamous cell carcinoma     Family history of prostate cancer     Restless leg syndrome     Anxiety     Benign prostatic hyperplasia with nocturia     Multiple gastric ulcers     CKD (chronic kidney disease) stage 3, GFR 30-59 ml/min (H)       Past Medical History:   Diagnosis Date     Diabetes mellitus (H)      Squamous cell carcinoma       Problem (# of Occurrences) Relation (Name,Age of Onset)    Cerebrovascular Disease (2) Mother, Father    Diabetes (1) Brother    Hypertension (2) Mother, Father    Prostate Cancer (2) Brother, Brother        Past Surgical History:   Procedure Laterality Date     ARTHROPLASTY KNEE  2/10/2011    ARTHROPLASTY KNEE performed by DARLING FARRELL at [facility]     COLONOSCOPY N/A 3/2/2018    Procedure: COLONOSCOPY;  colonoscopy;  Surgeon: Aris Whittaker MD;  Location: [facility]     ESOPHAGOSCOPY, GASTROSCOPY, DUODENOSCOPY (EGD), COMBINED N/A 7/19/2019    Procedure: ESOPHAGOGASTRODUODENOSCOPY, WITH BIOPSY;  Surgeon: John Christianson  DO Aris;  Location: [facility]     MOHS MICROGRAPHIC PROCEDURE  2012    Sq. cell left helix     ORTHOPEDIC SURGERY       SURGICAL HISTORY OF -   2003    Cervical spine fx     Social History     Socioeconomic History     Marital status:      Spouse name: Rocio     Number of children: 3     Years of education: None     Highest education level: None   Occupational History     Employer: [employer]     Employer: RETIRED   Social Needs     Financial resource strain: None     Food insecurity     Worry: None     Inability: None     Transportation needs     Medical: None     Non-medical: None   Tobacco Use     Smoking status: Former Smoker     Last attempt to quit: 3/14/1990     Years since quittin.2     Smokeless tobacco: Never Used   Substance and Sexual Activity     Alcohol use: Yes     Comment: occasional     Drug use: No     Sexual activity: Yes     Partners: Female   Lifestyle     Physical activity     Days per week: None     Minutes per session: None     Stress: None   Relationships     Social connections     Talks on phone: None     Gets together: None     Attends [employer] service: None     Active member of club or organization: None     Attends meetings of clubs or organizations: None     Relationship status: None     Intimate partner violence     Fear of current or ex partner: None     Emotionally abused: None     Physically abused: None     Forced sexual activity: None   Other Topics Concern      Service No     Blood Transfusions No     Caffeine Concern Yes     Comment: Drinks about 14 cups of coffee daily     Occupational Exposure No     Hobby Hazards No     Sleep Concern No     Stress Concern No     Weight Concern No     Special Diet Yes     Comment: diabetic, watches his intake     Back Care No     Exercise Yes     Comment: try to walk daily     Bike Helmet No     Seat Belt Yes     Self-Exams Yes     Parent/sibling w/ CABG, MI or angioplasty before 65F 55M? No   Social History  Narrative    May 18, 2020        ENVIRONMENTAL HISTORY: [place] The home is heated with a forced air and gas furnace. They does have central air conditioning. The patient's bedroom is furnished with hard jonna in bedroom, allergen mattress cover and allergen pillowcase cover.  Pets inside the house include 0. There is no history of cockroach or mice infestation. There is/are 0 smokers in the house.  The house does not have a damp basement.            Review of Systems   Constitutional: Positive for fatigue. Negative for activity change and fever.   HENT: Positive for congestion and postnasal drip. Negative for dental problem, ear pain, facial swelling, nosebleeds, rhinorrhea, sinus pressure and sneezing.    Eyes: Positive for itching. Negative for discharge and redness.   Respiratory: Negative for cough, chest tightness, shortness of breath and wheezing.    Cardiovascular: Negative for chest pain.   Gastrointestinal: Negative for diarrhea, nausea and vomiting.   Musculoskeletal: Negative for arthralgias, joint swelling and myalgias.   Skin: Negative for rash.   Neurological: Negative for headaches.   Hematological: Negative for adenopathy.   Psychiatric/Behavioral: Negative for behavioral problems and self-injury.           Current Outpatient Medications:      albuterol (PROAIR HFA/PROVENTIL HFA/VENTOLIN HFA) 108 (90 Base) MCG/ACT inhaler, Inhale 2 puffs into the lungs every 6 hours as needed for shortness of breath / dyspnea or wheezing, Disp: 1 Inhaler, Rfl: 1     amLODIPine (NORVASC) 5 MG tablet, TAKE 2 TABLETS (10 MG) BY MOUTH DAILY, Disp: 180 tablet, Rfl: 3     aspirin (ASA) 81 MG tablet, Take 1 tablet (81 mg) by mouth daily, Disp: 30 tablet, Rfl: 0     atorvastatin (LIPITOR) 20 MG tablet, Take 1 tablet (20 mg) by mouth daily, Disp: 90 tablet, Rfl: 3     B Complex Vitamins (VITAMIN B COMPLEX PO), Take 1 tablet by mouth daily., Disp: , Rfl:      CALCIUM  CARBONATE-VITAMIN D 500-200 MG-UNIT OR TABS, Take one tablet twice daily with food, Disp: , Rfl:      fluticasone (ARNUITY ELLIPTA) 100 MCG/ACT inhaler, Inhale 1 puff into the lungs daily, Disp: 3 each, Rfl: 1     fluticasone (FLONASE) 50 MCG/ACT nasal spray, Spray 1-2 sprays into both nostrils daily, Disp: 16 g, Rfl: 3     glipiZIDE (GLUCOTROL XL) 5 MG 24 hr tablet, TAKE 1 TABLET (5 MG) BY MOUTH DAILY, Disp: 90 tablet, Rfl: 3     lisinopril (ZESTRIL) 40 MG tablet, TAKE ONE TABLET DAILY, Disp: 90 tablet, Rfl: 3     magnesium 500 MG TABS, Take 500 mg by mouth daily, Disp: , Rfl:      metFORMIN (GLUCOPHAGE) 500 MG tablet, TAKE 2 TABLETS IN THE MORNING AND TAKE 2 TABLETS AT NIGHT, Disp: 360 tablet, Rfl: 3     metoprolol tartrate (LOPRESSOR) 25 MG tablet, TAKE 1 TABLET (25 MG) BY MOUTH 2 TIMES DAILY, Disp: 180 tablet, Rfl: 3     omeprazole (PRILOSEC) 20 MG DR capsule, Take 1 capsule (20 mg) by mouth daily, Disp: 90 capsule, Rfl: 3     Potassium Gluconate 595 MG CAPS, Take 595 mg by mouth daily, Disp: , Rfl:      Selenium 200 MCG TABS, Take 200 mcg by mouth daily, Disp: , Rfl:      tamsulosin (FLOMAX) 0.4 MG capsule, TAKE 1 CAPSULE (0.4 MG) BY MOUTH DAILY, Disp: 90 capsule, Rfl: 3     ACCU-CHEK ANTONIA PLUS test strip, USE 1 STRIP TO CHECK GLUCOSE ONCE DAILY, Disp: 180 strip, Rfl: 1     ACCU-CHEK ANTONIA test strip, USE ONE STRIP TO CHECK GLUCOSE ONCE DAILY, Disp: 100 each, Rfl: 1     albuterol (PROVENTIL) (2.5 MG/3ML) 0.083% neb solution, Take 1 vial (2.5 mg) by nebulization every 6 hours as needed for shortness of breath / dyspnea or wheezing (Patient not taking: Reported on 5/18/2020), Disp: 25 vial, Rfl: 1     aspirin 81 MG tablet, Take 1 tablet by mouth daily., Disp: , Rfl:      order for DME, Nebulizer, Disp: 1 Units, Rfl: 0  Immunization History   Administered Date(s) Administered     Influenza (H1N1) 12/24/2009     Influenza (High Dose) 3 valent vaccine 10/10/2013, 08/28/2014, 10/05/2016, 09/19/2017, 09/28/2018      Influenza (IIV3) PF 11/09/2000, 10/21/2003, 10/14/2004, 11/07/2005, 11/06/2006, 10/12/2007, 11/04/2008, 09/01/2009, 09/16/2010, 10/12/2011, 10/18/2012     Pneumo Conj 13-V (2010&after) 10/05/2016     Pneumococcal 23 valent 11/09/2000, 11/18/2007, 10/12/2011     TDAP Vaccine (Adacel) 12/04/2007, 09/28/2018       ASSESSMENT AND PLAN:    1. Seasonal allergic rhinitis due to other allergic trigger  Symptoms are mild, mainly in Spring.  He was on allergen immunotherapy with some improvement.  In the Spring, I recommend to use intranasal fluticasone 1-2 sprays in each nostril once daily.  He can add over-the-counter antihistamines to this regimen.    - fluticasone (FLONASE) 50 MCG/ACT nasal spray  Dispense: 16 g; Refill: 3    2. Mild intermittent asthma without complication  Based on new DAT guidelines, I recommend the patient to take an inhaled steroid daily and continue using albuterol inhaler 2 to 4 puffs every 4 hours as needed for persistent cough/chest tightness/wheezing/shortness of breath.  -Start Arnuity 100 mcg 1 puff once daily. rinse/gargle/spit water after use    - fluticasone (ARNUITY ELLIPTA) 100 MCG/ACT inhaler  Dispense: 3 each; Refill: 1           Follow up in 6 months or sooner if needed.      Telephone started: 11:11 am Telephone Ended: 11:30 am     Phone call duration: 19 minutes    This patient was evaluated virtually during the COVID-19 outbreak in attempts to keep healthy patients out of the clinic. I evaluated them by phone and this note reflects our conversation.    Disclaimer: This note consists of symbols derived from keyboarding, dictation and/or voice recognition software. As a result, there may be errors in the script that have gone undetected. Please consider this when interpreting information found in this chart.     Tom Cook MD, FAAAAI, FACAAI  Allergy, Asthma and Immunology  [facility]

## 2020-05-18 NOTE — PATIENT INSTRUCTIONS
In the Spring, I recommend to use intranasal fluticasone 1-2 sprays in each nostril once daily.  You can add over-the-counter cetirizine or loratadine on as-needed basis.    -Continue albuterol 2 to 4 puffs every 4 hours as needed for persistent cough/chest tightness/wheezing/shortness of breath.  - Start Arnuity 100 mcg 1 puff once daily. rinse/gargle/spit water after use

## 2020-05-18 NOTE — TELEPHONE ENCOUNTER
Called to reschedule 11am appointment today to IN PERSON instead of telephone as pt is an asthma NEW. Dr Cook would prefer to see him in person if possible. If not possible we can keep appointment as telephone.     If pt calls please inform of above.     Jesenia LANE   Allergy RN

## 2020-05-20 ENCOUNTER — TELEPHONE (OUTPATIENT)
Dept: ALLERGY | Facility: CLINIC | Age: 75
End: 2020-05-20

## 2020-05-20 DIAGNOSIS — J45.20 MILD INTERMITTENT ASTHMA WITHOUT COMPLICATION: Primary | ICD-10-CM

## 2020-05-20 NOTE — TELEPHONE ENCOUNTER
Reason for Call:  Other prescription    Detailed comments: one rx was very expensive - believes it was the fluticasone (ARNUITY ELLIPTA) 100 MCG/ACT inhaler  States $350 - looking for something else.    Pharmacy: [facility] mail order    Phone Number Patient can be reached at: Home number on file [phone] (home)    Best Time:     Can we leave a detailed message on this number? YES - states also ok to speak with wife Rocio    Call taken on 5/20/2020 at 10:29 AM by Roz Menard

## 2020-05-21 RX ORDER — FLUTICASONE PROPIONATE 110 UG/1
2 AEROSOL, METERED RESPIRATORY (INHALATION) 2 TIMES DAILY
Qty: 12 G | Refills: 3 | Status: SHIPPED | OUTPATIENT
Start: 2020-05-21 | End: 2020-07-13

## 2020-05-21 NOTE — TELEPHONE ENCOUNTER
It looks like Flovent price will still be $200.  He can use it in 2 different ways.  One way is to use it 2 puffs twice daily.  The other way, to assure that he gets some inhaled steroid when he has symptoms, I would suggest using Flovent 2 puffs twice daily, at least on the days when he needs to use an albuterol inhaler.    Tom Cook MD

## 2020-05-21 NOTE — TELEPHONE ENCOUNTER
That is quite expensive.  Lets try a different ICS.  I will prescribe Qvar 80 mcg 2 puffs twice daily.  He should ask the pharmacist how to use it properly.  I prescribed it to [facility] pharmacy.  If it is not covered, we will find out, and try prescribing something that is cheaper.  If he tolerates it well, and the price fits him, we will resend it later to his mail pharmacy.    Tom Cook MD

## 2020-05-21 NOTE — TELEPHONE ENCOUNTER
Called and spoke with [facility] pharmacist Brent. States covered will be Arnuity, Flovent HFA or Flovent Diskus. All others require PAs.     Called and spoke with pt states he would like to try Flovent sent to [facility] to keep on file. And will initiate the fill when ready.     Jesenia LANE   Allergy RN

## 2020-06-04 DIAGNOSIS — E78.00 PURE HYPERCHOLESTEROLEMIA: ICD-10-CM

## 2020-06-04 RX ORDER — ATORVASTATIN CALCIUM 20 MG/1
20 TABLET, FILM COATED ORAL DAILY
Qty: 90 TABLET | Refills: 0 | Status: SHIPPED | OUTPATIENT
Start: 2020-06-04 | End: 2020-07-13

## 2020-06-19 DIAGNOSIS — K25.9 MULTIPLE GASTRIC ULCERS: ICD-10-CM

## 2020-07-06 ENCOUNTER — TELEPHONE (OUTPATIENT)
Dept: FAMILY MEDICINE | Facility: CLINIC | Age: 75
End: 2020-07-06

## 2020-07-06 DIAGNOSIS — N18.30 CKD (CHRONIC KIDNEY DISEASE) STAGE 3, GFR 30-59 ML/MIN (H): ICD-10-CM

## 2020-07-06 DIAGNOSIS — E78.5 HYPERLIPIDEMIA LDL GOAL <100: ICD-10-CM

## 2020-07-06 DIAGNOSIS — E11.21 TYPE 2 DIABETES MELLITUS WITH DIABETIC NEPHROPATHY, WITHOUT LONG-TERM CURRENT USE OF INSULIN (H): Primary | ICD-10-CM

## 2020-07-06 NOTE — TELEPHONE ENCOUNTER
Pt will see Dr Terry for Diabetic check next Monday 7/13. He will come on Saturday for lab. Please put in lab orders.  He says he would like his Sodium checked too.  No need to call back. He will be fasting.

## 2020-07-08 DIAGNOSIS — E11.21 TYPE 2 DIABETES MELLITUS WITH DIABETIC NEPHROPATHY, WITHOUT LONG-TERM CURRENT USE OF INSULIN (H): ICD-10-CM

## 2020-07-08 RX ORDER — BLOOD SUGAR DIAGNOSTIC
STRIP MISCELLANEOUS
Qty: 180 STRIP | Refills: 1 | Status: SHIPPED | OUTPATIENT
Start: 2020-07-08 | End: 2020-10-19

## 2020-07-11 DIAGNOSIS — E78.5 HYPERLIPIDEMIA LDL GOAL <100: Primary | ICD-10-CM

## 2020-07-11 DIAGNOSIS — N18.30 CKD (CHRONIC KIDNEY DISEASE) STAGE 3, GFR 30-59 ML/MIN (H): ICD-10-CM

## 2020-07-11 DIAGNOSIS — E11.21 TYPE 2 DIABETES MELLITUS WITH DIABETIC NEPHROPATHY, WITHOUT LONG-TERM CURRENT USE OF INSULIN (H): ICD-10-CM

## 2020-07-11 LAB
ANION GAP SERPL CALCULATED.3IONS-SCNC: 6 MMOL/L (ref 3–14)
BUN SERPL-MCNC: 22 MG/DL (ref 7–30)
CALCIUM SERPL-MCNC: 8.5 MG/DL (ref 8.5–10.1)
CHLORIDE SERPL-SCNC: 93 MMOL/L (ref 94–109)
CHOLEST SERPL-MCNC: 123 MG/DL
CO2 SERPL-SCNC: 27 MMOL/L (ref 20–32)
CREAT SERPL-MCNC: 1.4 MG/DL (ref 0.66–1.25)
GFR SERPL CREATININE-BSD FRML MDRD: 49 ML/MIN/{1.73_M2}
GLUCOSE SERPL-MCNC: 344 MG/DL (ref 70–99)
HBA1C MFR BLD: 5.7 % (ref 0–5.6)
HDLC SERPL-MCNC: 70 MG/DL
LDLC SERPL CALC-MCNC: 44 MG/DL
NONHDLC SERPL-MCNC: 53 MG/DL
POTASSIUM SERPL-SCNC: 4.9 MMOL/L (ref 3.4–5.3)
SODIUM SERPL-SCNC: 126 MMOL/L (ref 133–144)
TRIGL SERPL-MCNC: 43 MG/DL

## 2020-07-11 PROCEDURE — 80061 LIPID PANEL: CPT | Performed by: FAMILY MEDICINE

## 2020-07-11 PROCEDURE — 36415 COLL VENOUS BLD VENIPUNCTURE: CPT | Performed by: FAMILY MEDICINE

## 2020-07-11 PROCEDURE — 83036 HEMOGLOBIN GLYCOSYLATED A1C: CPT | Performed by: FAMILY MEDICINE

## 2020-07-11 PROCEDURE — 80048 BASIC METABOLIC PNL TOTAL CA: CPT | Performed by: FAMILY MEDICINE

## 2020-07-11 NOTE — LETTER
July 13, 2020      Sae Milligan  [address] [id number]        Dear ,    We are writing to inform you of your test results.    Labs stable.  Sodium level is actually OK, it will run a little low when blood glucose is elevated like yours was.       I hope things are going well.     Resulted Orders   Lipid panel reflex to direct LDL Fasting   Result Value Ref Range    Cholesterol 123 <200 mg/dL    Triglycerides 43 <150 mg/dL      Comment:      Non Fasting    HDL Cholesterol 70 >39 mg/dL    LDL Cholesterol Calculated 44 <100 mg/dL      Comment:      Desirable:       <100 mg/dl    Non HDL Cholesterol 53 <130 mg/dL   Hemoglobin A1c   Result Value Ref Range    Hemoglobin A1C 5.7 (H) 0 - 5.6 %      Comment:      Normal <5.7% Prediabetes 5.7-6.4%  Diabetes 6.5% or higher - adopted from ADA   consensus guidelines.     Basic metabolic panel  (Ca, Cl, CO2, Creat, Gluc, K, Na, BUN)   Result Value Ref Range    Sodium 126 (L) 133 - 144 mmol/L    Potassium 4.9 3.4 - 5.3 mmol/L    Chloride 93 (L) 94 - 109 mmol/L    Carbon Dioxide 27 20 - 32 mmol/L    Anion Gap 6 3 - 14 mmol/L    Glucose 344 (H) 70 - 99 mg/dL      Comment:      Non Fasting    Urea Nitrogen 22 7 - 30 mg/dL    Creatinine 1.40 (H) 0.66 - 1.25 mg/dL    GFR Estimate 49 (L) >60 mL/min/[1.73_m2]      Comment:      Non  GFR Calc  Starting 12/18/2018, serum creatinine based estimated GFR (eGFR) will be   calculated using the Chronic Kidney Disease Epidemiology Collaboration   (CKD-EPI) equation.      GFR Estimate If Black 57 (L) >60 mL/min/[1.73_m2]      Comment:       GFR Calc  Starting 12/18/2018, serum creatinine based estimated GFR (eGFR) will be   calculated using the Chronic Kidney Disease Epidemiology Collaboration   (CKD-EPI) equation.      Calcium 8.5 8.5 - 10.1 mg/dL       If you have any questions or concerns, please call the clinic at the number listed above.       Sincerely,      Rashad Terry MD/  ss

## 2020-07-13 ENCOUNTER — OFFICE VISIT (OUTPATIENT)
Dept: FAMILY MEDICINE | Facility: CLINIC | Age: 75
End: 2020-07-13
Payer: MEDICARE

## 2020-07-13 VITALS
HEART RATE: 64 BPM | TEMPERATURE: 97.9 F | BODY MASS INDEX: 28.77 KG/M2 | SYSTOLIC BLOOD PRESSURE: 120 MMHG | HEIGHT: 66 IN | WEIGHT: 179 LBS | DIASTOLIC BLOOD PRESSURE: 62 MMHG

## 2020-07-13 DIAGNOSIS — E11.21 TYPE 2 DIABETES MELLITUS WITH DIABETIC NEPHROPATHY, WITHOUT LONG-TERM CURRENT USE OF INSULIN (H): Primary | ICD-10-CM

## 2020-07-13 DIAGNOSIS — K25.9 MULTIPLE GASTRIC ULCERS: ICD-10-CM

## 2020-07-13 DIAGNOSIS — E78.00 PURE HYPERCHOLESTEROLEMIA: ICD-10-CM

## 2020-07-13 DIAGNOSIS — E78.5 HYPERLIPIDEMIA LDL GOAL <100: ICD-10-CM

## 2020-07-13 DIAGNOSIS — N18.30 CKD (CHRONIC KIDNEY DISEASE) STAGE 3, GFR 30-59 ML/MIN (H): ICD-10-CM

## 2020-07-13 PROCEDURE — 99214 OFFICE O/P EST MOD 30 MIN: CPT | Performed by: FAMILY MEDICINE

## 2020-07-13 RX ORDER — ATORVASTATIN CALCIUM 20 MG/1
20 TABLET, FILM COATED ORAL DAILY
Qty: 90 TABLET | Refills: 0 | Status: SHIPPED | OUTPATIENT
Start: 2020-07-13 | End: 2020-07-13

## 2020-07-13 RX ORDER — ATORVASTATIN CALCIUM 20 MG/1
20 TABLET, FILM COATED ORAL DAILY
Qty: 90 TABLET | Refills: 3 | Status: SHIPPED | OUTPATIENT
Start: 2020-07-13 | End: 2021-05-06

## 2020-07-13 ASSESSMENT — MIFFLIN-ST. JEOR: SCORE: 1494.69

## 2020-07-13 NOTE — NURSING NOTE
"Initial /62   Pulse 64   Temp 97.9  F (36.6  C) (Tympanic)   Ht 1.676 m (5' 6\")   Wt 81.2 kg (179 lb)   BMI 28.89 kg/m   Estimated body mass index is 28.89 kg/m  as calculated from the following:    Height as of this encounter: 1.676 m (5' 6\").    Weight as of this encounter: 81.2 kg (179 lb). .    Lidia Miranda CMA([facility])    "

## 2020-07-13 NOTE — PROGRESS NOTES
"Sae Milligan is a 74 year old male comes in today with the following concerns.      1.   Diabetes Follow-up    How often are you checking your blood sugar? One time daily  What time of day are you checking your blood sugars (select all that apply)?  Before and after meals  Have you had any blood sugars above 200?  No  Have you had any blood sugars below 70?  Yes 1 time.    What symptoms do you notice when your blood sugar is low?  \"I just feel it\"    What concerns do you have today about your diabetes? None     Do you have any of these symptoms? (Select all that apply)  No numbness or tingling in feet.  No redness, sores or blisters on feet.  No complaints of excessive thirst.  No reports of blurry vision.  No significant changes to weight.      Hyperlipidemia Follow-Up      Are you regularly taking any medication or supplement to lower your cholesterol?   Yes- atorvastatin    Are you having muscle aches or other side effects that you think could be caused by your cholesterol lowering medication?  No    Hypertension Follow-up      Do you check your blood pressure regularly outside of the clinic? Yes     Are you following a low salt diet? Yes    Are your blood pressures ever more than 140 on the top number (systolic) OR more   than 90 on the bottom number (diastolic), for example 140/90? No    S ;Sae Milligan is a 74 year old male with DM2, nephropathy.  Stable on labs.  Refills.  A1C good     Gastric ulcers: needs fills on omeprazole.  Chronic use.  No recurrence    Hyperlipdemia: statin.  Fills    CKd: stable.  On full dose lisinopril.  BP good    No cp or sob    Problem list, med list, additional histories reviewed and updated, as indicated.      O:/62   Pulse 64   Temp 97.9  F (36.6  C) (Tympanic)   Ht 1.676 m (5' 6\")   Wt 81.2 kg (179 lb)   BMI 28.89 kg/m    GEN: Alert and oriented, in no acute distress  CV: RRR, no murmur  RESP: lungs clear bilaterally, good effort  EXT: no edema or lesions noted in " lower extremities      A: DM2, nephropathy.  Stable on labs.  Refills.  A1C good   Gastric ulcers: needs fills on omeprazole.    Hyperlipdemia: statin.  Fills  CKd: stable.    P: fills.  Labs.  Doing well     Back in 6 mo

## 2020-09-23 ENCOUNTER — AMBULATORY - HEALTHEAST (OUTPATIENT)
Dept: SURGERY | Facility: HOSPITAL | Age: 75
End: 2020-09-23

## 2020-09-23 DIAGNOSIS — Z11.59 ENCOUNTER FOR SCREENING FOR OTHER VIRAL DISEASES: ICD-10-CM

## 2020-09-30 ENCOUNTER — OFFICE VISIT (OUTPATIENT)
Dept: FAMILY MEDICINE | Facility: CLINIC | Age: 75
End: 2020-09-30
Payer: MEDICARE

## 2020-09-30 VITALS
WEIGHT: 179 LBS | RESPIRATION RATE: 12 BRPM | DIASTOLIC BLOOD PRESSURE: 64 MMHG | BODY MASS INDEX: 28.89 KG/M2 | HEART RATE: 60 BPM | SYSTOLIC BLOOD PRESSURE: 124 MMHG

## 2020-09-30 DIAGNOSIS — N40.1 BENIGN PROSTATIC HYPERPLASIA WITH NOCTURIA: ICD-10-CM

## 2020-09-30 DIAGNOSIS — E11.21 TYPE 2 DIABETES MELLITUS WITH DIABETIC NEPHROPATHY, WITHOUT LONG-TERM CURRENT USE OF INSULIN (H): ICD-10-CM

## 2020-09-30 DIAGNOSIS — Z01.818 PREOP GENERAL PHYSICAL EXAM: Primary | ICD-10-CM

## 2020-09-30 DIAGNOSIS — N18.30 CKD (CHRONIC KIDNEY DISEASE) STAGE 3, GFR 30-59 ML/MIN (H): ICD-10-CM

## 2020-09-30 DIAGNOSIS — R35.1 BENIGN PROSTATIC HYPERPLASIA WITH NOCTURIA: ICD-10-CM

## 2020-09-30 DIAGNOSIS — Z23 NEED FOR PROPHYLACTIC VACCINATION AND INOCULATION AGAINST INFLUENZA: ICD-10-CM

## 2020-09-30 LAB
ANION GAP SERPL CALCULATED.3IONS-SCNC: 6 MMOL/L (ref 3–14)
BUN SERPL-MCNC: 18 MG/DL (ref 7–30)
CALCIUM SERPL-MCNC: 9.1 MG/DL (ref 8.5–10.1)
CHLORIDE SERPL-SCNC: 96 MMOL/L (ref 94–109)
CO2 SERPL-SCNC: 27 MMOL/L (ref 20–32)
CREAT SERPL-MCNC: 1.38 MG/DL (ref 0.66–1.25)
GFR SERPL CREATININE-BSD FRML MDRD: 50 ML/MIN/{1.73_M2}
GLUCOSE SERPL-MCNC: 140 MG/DL (ref 70–99)
POTASSIUM SERPL-SCNC: 4.7 MMOL/L (ref 3.4–5.3)
SODIUM SERPL-SCNC: 129 MMOL/L (ref 133–144)

## 2020-09-30 PROCEDURE — 93000 ELECTROCARDIOGRAM COMPLETE: CPT | Performed by: FAMILY MEDICINE

## 2020-09-30 PROCEDURE — 80048 BASIC METABOLIC PNL TOTAL CA: CPT | Performed by: FAMILY MEDICINE

## 2020-09-30 PROCEDURE — G0008 ADMIN INFLUENZA VIRUS VAC: HCPCS | Performed by: FAMILY MEDICINE

## 2020-09-30 PROCEDURE — 99214 OFFICE O/P EST MOD 30 MIN: CPT | Mod: 25 | Performed by: FAMILY MEDICINE

## 2020-09-30 PROCEDURE — 90662 IIV NO PRSV INCREASED AG IM: CPT | Performed by: FAMILY MEDICINE

## 2020-09-30 PROCEDURE — 36415 COLL VENOUS BLD VENIPUNCTURE: CPT | Performed by: FAMILY MEDICINE

## 2020-09-30 NOTE — PROGRESS NOTES
[facility]  [address] [place] [address]  Phone: [phone]  Fax: [phone]  Primary Provider: Rashad Terry      PREOPERATIVE EVALUATION:  Today's date: 9/30/2020    Sae Milligan is a 74 year old male who presents for a preoperative evaluation.    Surgical Information:  Surgery Details 9/30/2020   Surgery/Procedure: Cystoscopoy   Surgery Location: [facility]   Surgeon: Dr. Aris Hawkins   Surgery Date: 10/12/2020   Time of Surgery: na   Where patient plans to recover: At home with family   Additional recovery plan details: N/A     Fax number for surgical facility: [phone]  Type of Anesthesia Anticipated: partial    Subjective     HPI related to upcoming procedure:   Preop Questions 9/30/2020   1. Have you ever had a heart attack or stroke? No   2. Have you ever had surgery on your heart or blood vessels, such as a stent placement, a coronary artery bypass, or surgery on an artery in your head, neck, heart, or legs? No   3. Do you have chest pain with activity? no   4. Do you have a history of  heart failure? No   5. Do you currently have a cold, bronchitis or symptoms of other infection? No   6. Do you have a cough, shortness of breath, or wheezing? No   7. Do you or anyone in your family have previous history of blood clots? No   8. Do you or does anyone in your family have a serious bleeding problem such as prolonged bleeding following surgeries or cuts? No   9. Have you ever had problems with anemia or been told to take iron pills? No   10. Have you had any abnormal blood loss such as black, tarry or bloody stools? No   11. Have you ever had a blood transfusion? No   Are you willing to have a blood transfusion if it is medically needed before, during, or after your surgery? Yes   13. Have you or any of your relatives ever had problems with anesthesia? No   14. Do you have sleep apnea, excessive snoring or daytime drowsiness? No   15. Do you have any artifical heart  valves or other implanted medical devices like a pacemaker, defibrillator, or continuous glucose monitor? No   16. Do you have artificial joints? YES - bilateral knees   17. Are you allergic to latex? No     Patient does not have a Health Care Directive or Living Will: not discussed      RX [place] ([place] reviewed:  reviewed- no concerns    S: Sae Milligan is a 74 year old male with BPH.  Having cystoscopy to evaluate for possible procedure    DM2: recheck.  Med controlled, a1c less than 6  Htn: stable on meds  Hyperlipideimia: statin  Gerd: PPI  Cdk3: stable, recheck      Runs low on sodium.  Recheck today.  125-130 chronically            Review of Systems  No cp or sob.  No LE edema.  No fever or cough.        Patient Active Problem List    Diagnosis Date Noted     CKD (chronic kidney disease) stage 3, GFR 30-59 ml/min (H) 11/13/2019     Priority: Medium     Multiple gastric ulcers 08/26/2019     Priority: Medium     Seen on EGD for some epigastric pain.  None with stigmata of bleeding . Started on PPI.  8/19       Benign prostatic hyperplasia with nocturia 06/03/2019     Priority: Medium     Anxiety 12/17/2013     Priority: Medium     Restless leg syndrome 12/02/2013     Priority: Medium     Family history of prostate cancer 05/13/2013     Priority: Medium     3 brothers  had prostate cancer       Squamous cell carcinoma 10/03/2012     Priority: Medium     Left helix. July 20, 2012       Allergic rhinitis 10/12/2011     Priority: Medium     DJD (degenerative joint disease) 02/09/2011     Priority: Medium     Type 2 diabetes mellitus with diabetic nephropathy (H) 03/14/2005     Priority: Medium              Hyperlipidemia LDL goal <100 03/14/2005     Priority: Medium     Had myalgias from simvastatin, were better immediately after stopping, tolerated Lipitor well       Health Care Home 06/23/2011     Priority: Low     EMERGENCY CARE PLAN  Presenting Problem Signs and Symptoms Treatment Plan     Questions or conerns during clinic hours    I will call the clinic directly     Questions or conerns outside clinic hours    I will call the 24 hour nurse line at [phone]    Patient needs to schedule an appointment    I will call the 24 hour scheduling team at [phone] or clinic directly    Same day treatment     I will call the clinic first, nurse line if after hours, urgent care and express care if needed                            DX V65.8 REPLACED WITH [id number] HEALTH CARE HOME (04/08/2013)          Past Surgical History:   Procedure Laterality Date     ARTHROPLASTY KNEE  2/10/2011    ARTHROPLASTY KNEE performed by DARLING FARRELL at [facility]     COLONOSCOPY N/A 3/2/2018    Procedure: COLONOSCOPY;  colonoscopy;  Surgeon: Aris Whittaker MD;  Location: [facility]     ESOPHAGOSCOPY, GASTROSCOPY, DUODENOSCOPY (EGD), COMBINED N/A 7/19/2019    Procedure: ESOPHAGOGASTRODUODENOSCOPY, WITH BIOPSY;  Surgeon: John Christianson DO;  Location: [facility]     MOHS MICROGRAPHIC PROCEDURE  july 2012    Sq. cell left helix     ORTHOPEDIC SURGERY       SURGICAL HISTORY OF -   12/2003    Cervical spine fx     Current Outpatient Medications   Medication Sig Dispense Refill     ACCU-CHEK ANTONIA test strip USE ONE STRIP TO CHECK GLUCOSE ONCE DAILY 100 each 1     albuterol (PROAIR HFA/PROVENTIL HFA/VENTOLIN HFA) 108 (90 Base) MCG/ACT inhaler Inhale 2 puffs into the lungs every 6 hours as needed for shortness of breath / dyspnea or wheezing 1 Inhaler 1     albuterol (PROVENTIL) (2.5 MG/3ML) 0.083% neb solution Take 1 vial (2.5 mg) by nebulization every 6 hours as needed for shortness of breath / dyspnea or wheezing 25 vial 1     amLODIPine (NORVASC) 5 MG tablet TAKE 2 TABLETS (10 MG) BY MOUTH DAILY 180 tablet 3     aspirin (ASA) 81 MG tablet Take 1 tablet (81 mg) by mouth daily 30 tablet 0     aspirin 81 MG tablet Take 1 tablet by mouth daily.       atorvastatin (LIPITOR) 20 MG tablet Take 1 tablet (20 mg) by mouth daily 90 tablet 3      B Complex Vitamins (VITAMIN B COMPLEX PO) Take 1 tablet by mouth daily.       blood glucose (ACCU-CHEK ANTONIA PLUS) test strip USE 1 STRIP TO CHECK GLUCOSE ONCE DAILY 180 strip 1     CALCIUM CARBONATE-VITAMIN D 500-200 MG-UNIT OR TABS Take one tablet twice daily with food       fluticasone (FLONASE) 50 MCG/ACT nasal spray Spray 1-2 sprays into both nostrils daily 16 g 3     glipiZIDE (GLUCOTROL XL) 5 MG 24 hr tablet TAKE 1 TABLET (5 MG) BY MOUTH DAILY 90 tablet 3     lisinopril (ZESTRIL) 40 MG tablet TAKE ONE TABLET DAILY 90 tablet 3     magnesium 500 MG TABS Take 500 mg by mouth daily       metFORMIN (GLUCOPHAGE) 500 MG tablet TAKE 2 TABLETS IN THE MORNING AND TAKE 2 TABLETS AT NIGHT 360 tablet 3     metoprolol tartrate (LOPRESSOR) 25 MG tablet TAKE 1 TABLET (25 MG) BY MOUTH 2 TIMES DAILY 180 tablet 3     omeprazole (PRILOSEC) 20 MG DR capsule Take 1 capsule (20 mg) by mouth daily 90 capsule 3     order for DME Nebulizer 1 Units 0     Potassium Gluconate 595 MG CAPS Take 595 mg by mouth daily       Selenium 200 MCG TABS Take 200 mcg by mouth daily       tamsulosin (FLOMAX) 0.4 MG capsule TAKE 1 CAPSULE (0.4 MG) BY MOUTH DAILY 90 capsule 3       Allergies   Allergen Reactions     Penicillins Hives, Itching and Swelling     Of lips        Social History     Tobacco Use     Smoking status: Former Smoker     Last attempt to quit: 3/14/1990     Years since quittin.5     Smokeless tobacco: Never Used   Substance Use Topics     Alcohol use: Yes     Comment: occasional     History   Drug Use No            Objective   /64   Pulse 60   Resp 12   Wt 81.2 kg (179 lb)   BMI 28.89 kg/m    GEN: Alert and oriented, in no acute distress  CV: RRR, no murmur  RESP: lungs clear bilaterally, good effort  EXT: no edema or lesions noted in lower extremities  Neck: neck supple without mass or lymphadenopathy  Normal gait      PRE-OP Diagnostics:  Basic met pending  First degree AV block, otherwise normal  EKG        956}     Assessment & Plan   The proposed surgical procedure is considered LOW risk.    REVISED CARDIAC RISK INDEX  The patient has the following serious cardiovascular risks for perioperative complications:  No serious cardiac risks = 0 points    INTERPRETATION: 0 points: Class I (very low risk - 0.4% complication rate)     A: pre op       BPH       DM2, stable       Hth, stable       Ckd3, stable      The patient has the following additional risks and recommendations for perioperative complications:      MEDICATION INSTRUCTIONS:  Knows to hold ASA a week before      RECOMMENDATION:  APPROVAL GIVEN to proceed with proposed procedure, without further diagnostic evaluation.    No follow-ups on file.    Signed Electronically by: Rashad Terry MD    Copy of this evaluation report is provided to requesting physician.    [facility] Preop Guidelines    Revised Cardiac Risk Index

## 2020-09-30 NOTE — NURSING NOTE
"Chief Complaint   Patient presents with     Pre-Op Exam     /64   Pulse 60   Resp 12   Wt 81.2 kg (179 lb)   BMI 28.89 kg/m   Estimated body mass index is 28.89 kg/m  as calculated from the following:    Height as of 8/26/20: 1.676 m (5' 6\").    Weight as of this encounter: 81.2 kg (179 lb).  Patient presents to the clinic using No DME      Health Maintenance that is potentially due pending provider review:    Health Maintenance Due   Topic Date Due     HEPATITIS B IMMUNIZATION (1 of 3 - Risk 3-dose series) 11/09/1964     MEDICARE ANNUAL WELLNESS VISIT  11/09/2010     AORTIC ANEURYSM SCREENING (SYSTEM ASSIGNED)  11/09/2010     DIABETIC FOOT EXAM  10/13/2016     ADVANCE CARE PLANNING  05/15/2017     PHQ-2  01/01/2020     ASTHMA CONTROL TEST  02/26/2020     MICROALBUMIN  03/06/2020     ASTHMA ACTION PLAN  08/26/2020     FALL RISK ASSESSMENT  08/26/2020     INFLUENZA VACCINE (1) 09/01/2020     EYE EXAM  10/08/2020        Possibly completing today per provider review.        "

## 2020-09-30 NOTE — PATIENT INSTRUCTIONS

## 2020-10-01 ASSESSMENT — ASTHMA QUESTIONNAIRES: ACT_TOTALSCORE: 25

## 2020-10-07 ENCOUNTER — AMBULATORY - HEALTHEAST (OUTPATIENT)
Dept: LAB | Facility: CLINIC | Age: 75
End: 2020-10-07

## 2020-10-07 DIAGNOSIS — Z11.59 ENCOUNTER FOR SCREENING FOR OTHER VIRAL DISEASES: ICD-10-CM

## 2020-10-09 ENCOUNTER — AMBULATORY - HEALTHEAST (OUTPATIENT)
Dept: SURGERY | Facility: HOSPITAL | Age: 75
End: 2020-10-09

## 2020-10-09 ENCOUNTER — COMMUNICATION - HEALTHEAST (OUTPATIENT)
Dept: SCHEDULING | Facility: CLINIC | Age: 75
End: 2020-10-09

## 2020-10-09 DIAGNOSIS — Z11.59 ENCOUNTER FOR SCREENING FOR OTHER VIRAL DISEASES: ICD-10-CM

## 2020-10-12 ENCOUNTER — ANESTHESIA - HEALTHEAST (OUTPATIENT)
Dept: SURGERY | Facility: HOSPITAL | Age: 75
End: 2020-10-12

## 2020-10-12 ENCOUNTER — SURGERY - HEALTHEAST (OUTPATIENT)
Dept: SURGERY | Facility: HOSPITAL | Age: 75
End: 2020-10-12

## 2020-10-12 ASSESSMENT — MIFFLIN-ST. JEOR: SCORE: 1484.69

## 2020-10-19 DIAGNOSIS — E11.21 TYPE 2 DIABETES MELLITUS WITH DIABETIC NEPHROPATHY, WITHOUT LONG-TERM CURRENT USE OF INSULIN (H): ICD-10-CM

## 2020-10-19 RX ORDER — BLOOD SUGAR DIAGNOSTIC
STRIP MISCELLANEOUS
Qty: 1 BOX | Refills: 1 | Status: SHIPPED | OUTPATIENT
Start: 2020-10-19 | End: 2021-01-22

## 2020-10-30 ENCOUNTER — TRANSFERRED RECORDS (OUTPATIENT)
Dept: HEALTH INFORMATION MANAGEMENT | Facility: CLINIC | Age: 75
End: 2020-10-30

## 2020-10-30 LAB — RETINOPATHY: NEGATIVE

## 2021-01-04 ENCOUNTER — OFFICE VISIT (OUTPATIENT)
Dept: DERMATOLOGY | Facility: CLINIC | Age: 76
End: 2021-01-04
Payer: MEDICARE

## 2021-01-04 VITALS
BODY MASS INDEX: 28.89 KG/M2 | DIASTOLIC BLOOD PRESSURE: 67 MMHG | HEART RATE: 55 BPM | HEIGHT: 66 IN | SYSTOLIC BLOOD PRESSURE: 152 MMHG

## 2021-01-04 DIAGNOSIS — L30.0 NUMMULAR DERMATITIS: Primary | ICD-10-CM

## 2021-01-04 DIAGNOSIS — D69.2 SOLAR PURPURA (H): ICD-10-CM

## 2021-01-04 PROCEDURE — 99203 OFFICE O/P NEW LOW 30 MIN: CPT | Performed by: PHYSICIAN ASSISTANT

## 2021-01-04 RX ORDER — BETAMETHASONE DIPROPIONATE 0.5 MG/G
CREAM TOPICAL
Qty: 50 G | Refills: 2 | Status: SHIPPED | OUTPATIENT
Start: 2021-01-04 | End: 2021-03-18

## 2021-01-04 NOTE — NURSING NOTE
"Initial BP (!) 152/67   Pulse 55   Ht 1.676 m (5' 6\")   BMI 28.89 kg/m   Estimated body mass index is 28.89 kg/m  as calculated from the following:    Height as of this encounter: 1.676 m (5' 6\").    Weight as of 9/30/20: 81.2 kg (179 lb). .      "

## 2021-01-04 NOTE — PROGRESS NOTES
"HPI:   Chief complaints: Sae Milligan is a 75 year old male who presents for evaluation of a rash on the legs and trunk. He has round shaped itchy/dry spots which have been present for several weeks. He has applied moisturizers to the area but this has not helped. He used an old topical steroid he had at home, but this also did not help.     In addition he has dark purple spots on both forearms. He has these on and off. No treatments tried for the areas. They do not itch or hurt.     No history of skin CA    Review Of Systems  Eyes: negative  Ears/Nose/Throat: negative  Respiratory: No shortness of breath, dyspnea on exertion, cough, or hemoptysis  Cardiovascular: negative  Gastrointestinal: negative  Genitourinary: negative  Musculoskeletal: negative  Neurologic: negative  Psychiatric: negative  Skin: positive for rash      PHYSICAL EXAM:    BP (!) 152/67   Pulse 55   Ht 1.676 m (5' 6\")   BMI 28.89 kg/m    Skin exam performed as follows: Type 2 skin. Mood appropriate  Alert and Oriented X 3. Well developed, well nourished in no distress.  General appearance: Normal  Head including face: Normal  Eyes: conjunctiva and lids: Normal  Mouth: Lips, teeth, gums: Normal  Neck: Normal  Chest-breast/axillae: Normal  Back: Normal  Spleen and liver: Normal  Cardiovascular: Exam of peripheral vascular system by observation for swelling, varicosities, edema: Normal  Genitalia: groin, buttocks: Normal  Extremities: digits/nails (clubbing): Normal  Eccrine and Apocrine glands: Normal  Right upper extremity: Normal  Left upper extremity: Normal  Right lower extremity: Normal  Left lower extremity: Normal  Skin: Scalp and body hair: See below    1. [place] shaped xerotic dermatitis on bilateral lower legs, trunk  2. Purpura on bilateral forearms     ASSESSMENT/PLAN:     1. Nummular dermatitis - advised on diagnosis and treatment options  --Switch to gentle soap daily  --Start betamethasone cream BID x 1-2 weeks PRN  --Thick " emollients daily  2. Solar purpura on bilateral forearms - advised secondary to chronic sun damage  3. Sae to follow up with Primary Care provider regarding elevated blood pressure.        Follow-up: PRN  CC:   Scribed By: Letitia Garcia MS, PAMynorC

## 2021-01-04 NOTE — LETTER
"    1/4/2021         RE: Sae Milligan  [address] [facility] [address]        Dear Colleague,    Thank you for referring your patient, Sae Milligan, to the [facility]. Please see a copy of my visit note below.    HPI:   Chief complaints: Sae Milligan is a 75 year old male who presents for evaluation of a rash on the legs and trunk. He has round shaped itchy/dry spots which have been present for several weeks. He has applied moisturizers to the area but this has not helped. He used an old topical steroid he had at home, but this also did not help.     In addition he has dark purple spots on both forearms. He has these on and off. No treatments tried for the areas. They do not itch or hurt.     No history of skin CA    Review Of Systems  Eyes: negative  Ears/Nose/Throat: negative  Respiratory: No shortness of breath, dyspnea on exertion, cough, or hemoptysis  Cardiovascular: negative  Gastrointestinal: negative  Genitourinary: negative  Musculoskeletal: negative  Neurologic: negative  Psychiatric: negative  Skin: positive for rash      PHYSICAL EXAM:    BP (!) 152/67   Pulse 55   Ht 1.676 m (5' 6\")   BMI 28.89 kg/m    Skin exam performed as follows: Type 2 skin. Mood appropriate  Alert and Oriented X 3. Well developed, well nourished in no distress.  General appearance: Normal  Head including face: Normal  Eyes: conjunctiva and lids: Normal  Mouth: Lips, teeth, gums: Normal  Neck: Normal  Chest-breast/axillae: Normal  Back: Normal  Spleen and liver: Normal  Cardiovascular: Exam of peripheral vascular system by observation for swelling, varicosities, edema: Normal  Genitalia: groin, buttocks: Normal  Extremities: digits/nails (clubbing): Normal  Eccrine and Apocrine glands: Normal  Right upper extremity: Normal  Left upper extremity: Normal  Right lower extremity: Normal  Left lower extremity: Normal  Skin: Scalp and body hair: See below    1. [place] shaped xerotic " dermatitis on bilateral lower legs, trunk  2. Purpura on bilateral forearms     ASSESSMENT/PLAN:     1. Nummular dermatitis - advised on diagnosis and treatment options  --Switch to gentle soap daily  --Start betamethasone cream BID x 1-2 weeks PRN  --Thick emollients daily  2. Solar purpura on bilateral forearms - advised secondary to chronic sun damage  3. Sae to follow up with Primary Care provider regarding elevated blood pressure.        Follow-up: PRN  CC:   Scribed By: Letitia Garcia MS, PA-C          Again, thank you for allowing me to participate in the care of your patient.        Sincerely,        Letitia Garcia PA-C

## 2021-01-04 NOTE — PATIENT INSTRUCTIONS
Directions for eczema (atopic dermatitis)    Bathe every day - we recommend short showers or baths (5 minutes or less) with lukewarm water.  Use a gentle soap such as Dove for sensitive skin, Cetaphil, Vanicream or CeraVe   Wash the  dirty areas  only - this means armpits, groin, buttocks and feet.   After the bath or shower, within 2 minutes:   1. Pat dry with towel    2. If prescribed a topical prescription, apply this FIRST to any red/rashy/itchy areas - betamethasone cream - you can do this twice daily to itchy/rashy areas for 1-2 weeks at a time when needed.     3. Apply a moisturizer to entire body, even where you just put the prescription medicine. We recommend CeraVe cream, Cetaphil cream or Vanicream. You will do this every single day. If you don t bathe every day we still recommend an application of body moisturizer.

## 2021-01-11 ENCOUNTER — TELEPHONE (OUTPATIENT)
Dept: FAMILY MEDICINE | Facility: CLINIC | Age: 76
End: 2021-01-11

## 2021-01-11 ENCOUNTER — OFFICE VISIT (OUTPATIENT)
Dept: FAMILY MEDICINE | Facility: CLINIC | Age: 76
End: 2021-01-11
Payer: MEDICARE

## 2021-01-11 VITALS
SYSTOLIC BLOOD PRESSURE: 138 MMHG | BODY MASS INDEX: 29.25 KG/M2 | OXYGEN SATURATION: 100 % | DIASTOLIC BLOOD PRESSURE: 72 MMHG | HEIGHT: 66 IN | WEIGHT: 182 LBS | HEART RATE: 72 BPM | RESPIRATION RATE: 16 BRPM | TEMPERATURE: 97 F

## 2021-01-11 DIAGNOSIS — E87.1 HYPONATREMIA: ICD-10-CM

## 2021-01-11 DIAGNOSIS — I10 ESSENTIAL HYPERTENSION: ICD-10-CM

## 2021-01-11 PROCEDURE — 99214 OFFICE O/P EST MOD 30 MIN: CPT | Performed by: FAMILY MEDICINE

## 2021-01-11 ASSESSMENT — MIFFLIN-ST. JEOR: SCORE: 1503.3

## 2021-01-11 NOTE — LETTER
[facility]  [address] [place] [address]  [phone]        Sae NGUYEN [facility]                                                               Date: 1/11/2021  [address] [facility] [address]      Dear Sae,    In order to ensure we are providing the best quality care, we have reviewed your chart and see that you are due for:  1.   Diabetic Follow up   2.   Blood pressure check       Please call the clinic at your earliest convenience to schedule an appointment.    We greatly appreciate the opportunity to serve you.  Thank you for trusting us with your health care.      Sincerely,    Your care team at [facility]    SELENE [address]

## 2021-01-11 NOTE — PROGRESS NOTES
"A: hyponatremia, chronic, stable       Htn, borderline, stable       Diabetes type 2 with renal dz, stable    P: long talk on more meds for BP, he wants to hold off.  Last EKG with rate in 50's, sodium issues on diuretics, I think letting him run a little higher vs. potenital serious risks with more BP pills is prudent.  He agrees.     Back in a few months for fills on all meds.  And labs.  Continue meds as in med list         S: Sae Milligan is a 75 year old male with htn.  Running high at times.  On lisinopril 40, some low dose beta blocker, 10mg norvasc.       Hyponatremia: runs around 130.  Hospitalization in past for low sodium when on diuretic.      DM2: sugars running well.  Some nephropathy, stage 3.     Problem list, med list, additional histories reviewed and updated, as indicated.      O;/72   Pulse 72   Temp 97  F (36.1  C) (Tympanic)   Resp 16   Ht 1.676 m (5' 6\")   Wt 82.6 kg (182 lb)   SpO2 100%   BMI 29.38 kg/m    GEN: Alert and oriented, in no acute distress  CV: RRR, no murmur  RESP: lungs clear bilaterally, good effor        "

## 2021-01-11 NOTE — TELEPHONE ENCOUNTER
Patient Quality Outreach      Summary:    Patient has the following on his problem list/HM:     Diabetes    Last A1C:  Lab Results   Component Value Date    A1C 5.7 2020    A1C 7.2 2019       Last LDL:    Lab Results   Component Value Date    LDL 44 2020       Is the patient on a Statin? Yes          Is the patient on Aspirin? No    Medications     HMG CoA Reductase Inhibitors     atorvastatin (LIPITOR) 20 MG tablet       Salicylates     aspirin (ASA) 81 MG tablet             Last three blood pressure readings:  BP Readings from Last 3 Encounters:   21 (!) 152/67   20 124/64   20 129/67            Tobacco Use      Smoking status: Former Smoker        Quit date: 3/14/1990        Years since quittin.8      Smokeless tobacco: Never Used          Hypertension   Last three blood pressure readings:  BP Readings from Last 3 Encounters:   21 (!) 152/67   20 124/64   20 129/67     Blood pressure: Failed    HTN Guidelines:  ? 139/89     Patient is due/failing the following:   A1C, Microalbumin, BP Check and Diabetic Follow-Up Visit and Hypertension follow-up visit    Type of outreach:    Sent letter.    Questions for provider review:    None                                                                                                                                     Stephania Douglas MA       Chart routed to .

## 2021-01-15 ENCOUNTER — HEALTH MAINTENANCE LETTER (OUTPATIENT)
Age: 76
End: 2021-01-15

## 2021-01-22 DIAGNOSIS — E11.21 TYPE 2 DIABETES MELLITUS WITH DIABETIC NEPHROPATHY, WITHOUT LONG-TERM CURRENT USE OF INSULIN (H): ICD-10-CM

## 2021-01-22 RX ORDER — BLOOD SUGAR DIAGNOSTIC
STRIP MISCELLANEOUS
Qty: 100 STRIP | Refills: 0 | Status: SHIPPED | OUTPATIENT
Start: 2021-01-22

## 2021-01-22 NOTE — TELEPHONE ENCOUNTER
"Prescription approved per [facility] Refill Protocol.    Requested Prescriptions   Pending Prescriptions Disp Refills     ACCU-CHEK ANTONIA PLUS test strip [Pharmacy Med Name: Accu-Chek Antonia Plus In Vitro Strip]  0     Sig: USE 1 STRIP TO CHECK GLUCOSE ONCE DAILY       Diabetic Supplies Protocol Passed - 1/22/2021  4:50 PM        Passed - Medication is active on med list        Passed - Patient is 18 years of age or older        Passed - Recent (6 mo) or future (30 days) visit within the authorizing provider's specialty     Patient had office visit in the last 6 months or has a visit in the next 30 days with authorizing provider.  See \"Patient Info\" tab in inbasket, or \"Choose Columns\" in Meds & Orders section of the refill encounter.               Mara HILL RN, BSN      "

## 2021-01-27 DIAGNOSIS — E11.21 TYPE 2 DIABETES MELLITUS WITH DIABETIC NEPHROPATHY, WITH LONG-TERM CURRENT USE OF INSULIN (H): ICD-10-CM

## 2021-01-27 DIAGNOSIS — R35.1 BENIGN PROSTATIC HYPERPLASIA WITH NOCTURIA: ICD-10-CM

## 2021-01-27 DIAGNOSIS — I10 ESSENTIAL HYPERTENSION: ICD-10-CM

## 2021-01-27 DIAGNOSIS — Z79.4 TYPE 2 DIABETES MELLITUS WITH DIABETIC NEPHROPATHY, WITH LONG-TERM CURRENT USE OF INSULIN (H): ICD-10-CM

## 2021-01-27 DIAGNOSIS — N18.1 CKD (CHRONIC KIDNEY DISEASE) STAGE 1, GFR 90 ML/MIN OR GREATER: ICD-10-CM

## 2021-01-27 DIAGNOSIS — I10 BENIGN ESSENTIAL HYPERTENSION: ICD-10-CM

## 2021-01-27 DIAGNOSIS — N40.1 BENIGN PROSTATIC HYPERPLASIA WITH NOCTURIA: ICD-10-CM

## 2021-01-29 RX ORDER — METOPROLOL TARTRATE 25 MG/1
25 TABLET, FILM COATED ORAL 2 TIMES DAILY
Qty: 180 TABLET | Refills: 0 | Status: SHIPPED | OUTPATIENT
Start: 2021-01-29 | End: 2021-02-22

## 2021-01-29 RX ORDER — TAMSULOSIN HYDROCHLORIDE 0.4 MG/1
0.4 CAPSULE ORAL DAILY
Qty: 90 CAPSULE | Refills: 0 | Status: SHIPPED | OUTPATIENT
Start: 2021-01-29 | End: 2021-02-22

## 2021-01-29 RX ORDER — LISINOPRIL 40 MG/1
TABLET ORAL
Qty: 90 TABLET | Refills: 0 | Status: SHIPPED | OUTPATIENT
Start: 2021-01-29 | End: 2021-02-22

## 2021-01-29 RX ORDER — AMLODIPINE BESYLATE 5 MG/1
10 TABLET ORAL DAILY
Qty: 180 TABLET | Refills: 0 | Status: SHIPPED | OUTPATIENT
Start: 2021-01-29 | End: 2021-02-22

## 2021-01-29 RX ORDER — GLIPIZIDE 5 MG/1
5 TABLET, FILM COATED, EXTENDED RELEASE ORAL DAILY
Qty: 90 TABLET | Refills: 0 | Status: SHIPPED | OUTPATIENT
Start: 2021-01-29 | End: 2021-02-22

## 2021-02-22 ENCOUNTER — OFFICE VISIT (OUTPATIENT)
Dept: FAMILY MEDICINE | Facility: CLINIC | Age: 76
End: 2021-02-22
Payer: MEDICARE

## 2021-02-22 VITALS
OXYGEN SATURATION: 97 % | DIASTOLIC BLOOD PRESSURE: 66 MMHG | TEMPERATURE: 97.8 F | HEIGHT: 66 IN | HEART RATE: 61 BPM | BODY MASS INDEX: 29.57 KG/M2 | WEIGHT: 184 LBS | SYSTOLIC BLOOD PRESSURE: 136 MMHG

## 2021-02-22 DIAGNOSIS — Z01.818 PRE-OP EXAM: Primary | ICD-10-CM

## 2021-02-22 DIAGNOSIS — E11.21 TYPE 2 DIABETES MELLITUS WITH DIABETIC NEPHROPATHY, WITH LONG-TERM CURRENT USE OF INSULIN (H): ICD-10-CM

## 2021-02-22 DIAGNOSIS — R35.1 BENIGN PROSTATIC HYPERPLASIA WITH NOCTURIA: ICD-10-CM

## 2021-02-22 DIAGNOSIS — I10 BENIGN ESSENTIAL HYPERTENSION: ICD-10-CM

## 2021-02-22 DIAGNOSIS — I10 ESSENTIAL HYPERTENSION: ICD-10-CM

## 2021-02-22 DIAGNOSIS — N18.1 CKD (CHRONIC KIDNEY DISEASE) STAGE 1, GFR 90 ML/MIN OR GREATER: ICD-10-CM

## 2021-02-22 DIAGNOSIS — N40.1 BENIGN PROSTATIC HYPERPLASIA WITH NOCTURIA: ICD-10-CM

## 2021-02-22 DIAGNOSIS — G89.29 CHRONIC LEFT SHOULDER PAIN: ICD-10-CM

## 2021-02-22 DIAGNOSIS — Z79.4 TYPE 2 DIABETES MELLITUS WITH DIABETIC NEPHROPATHY, WITH LONG-TERM CURRENT USE OF INSULIN (H): ICD-10-CM

## 2021-02-22 DIAGNOSIS — M25.512 CHRONIC LEFT SHOULDER PAIN: ICD-10-CM

## 2021-02-22 LAB
ANION GAP SERPL CALCULATED.3IONS-SCNC: 5 MMOL/L (ref 3–14)
BUN SERPL-MCNC: 27 MG/DL (ref 7–30)
CALCIUM SERPL-MCNC: 9.3 MG/DL (ref 8.5–10.1)
CHLORIDE SERPL-SCNC: 99 MMOL/L (ref 94–109)
CO2 SERPL-SCNC: 28 MMOL/L (ref 20–32)
CREAT SERPL-MCNC: 1.44 MG/DL (ref 0.66–1.25)
GFR SERPL CREATININE-BSD FRML MDRD: 47 ML/MIN/{1.73_M2}
GLUCOSE SERPL-MCNC: 139 MG/DL (ref 70–99)
HBA1C MFR BLD: 5.4 % (ref 0–5.6)
POTASSIUM SERPL-SCNC: 5.1 MMOL/L (ref 3.4–5.3)
SODIUM SERPL-SCNC: 132 MMOL/L (ref 133–144)

## 2021-02-22 PROCEDURE — 83036 HEMOGLOBIN GLYCOSYLATED A1C: CPT | Performed by: FAMILY MEDICINE

## 2021-02-22 PROCEDURE — 99214 OFFICE O/P EST MOD 30 MIN: CPT | Performed by: FAMILY MEDICINE

## 2021-02-22 PROCEDURE — 36415 COLL VENOUS BLD VENIPUNCTURE: CPT | Performed by: FAMILY MEDICINE

## 2021-02-22 PROCEDURE — 80048 BASIC METABOLIC PNL TOTAL CA: CPT | Performed by: FAMILY MEDICINE

## 2021-02-22 RX ORDER — CLONIDINE HYDROCHLORIDE 0.1 MG/1
0.1 TABLET ORAL 2 TIMES DAILY
Qty: 180 TABLET | Refills: 1 | Status: SHIPPED | OUTPATIENT
Start: 2021-02-22 | End: 2021-06-30

## 2021-02-22 RX ORDER — LISINOPRIL 40 MG/1
TABLET ORAL
Qty: 90 TABLET | Refills: 1 | Status: SHIPPED | OUTPATIENT
Start: 2021-02-22 | End: 2021-09-27

## 2021-02-22 RX ORDER — GLIPIZIDE 5 MG/1
5 TABLET, FILM COATED, EXTENDED RELEASE ORAL DAILY
Qty: 90 TABLET | Refills: 1 | Status: SHIPPED | OUTPATIENT
Start: 2021-02-22 | End: 2021-09-27

## 2021-02-22 RX ORDER — CLONIDINE HYDROCHLORIDE 0.1 MG/1
0.1 TABLET ORAL DAILY
COMMUNITY
Start: 2021-02-10 | End: 2021-02-22

## 2021-02-22 RX ORDER — AMLODIPINE BESYLATE 5 MG/1
10 TABLET ORAL DAILY
Qty: 180 TABLET | Refills: 1 | Status: SHIPPED | OUTPATIENT
Start: 2021-02-22 | End: 2021-09-27

## 2021-02-22 RX ORDER — TAMSULOSIN HYDROCHLORIDE 0.4 MG/1
0.4 CAPSULE ORAL DAILY
Qty: 90 CAPSULE | Refills: 1 | Status: SHIPPED | OUTPATIENT
Start: 2021-02-22 | End: 2021-09-23

## 2021-02-22 RX ORDER — METOPROLOL TARTRATE 25 MG/1
25 TABLET, FILM COATED ORAL 2 TIMES DAILY
Qty: 180 TABLET | Refills: 1 | Status: SHIPPED | OUTPATIENT
Start: 2021-02-22 | End: 2021-09-23

## 2021-02-22 RX ORDER — IRBESARTAN 300 MG/1
300 TABLET ORAL DAILY
COMMUNITY
Start: 2021-02-10 | End: 2021-02-22

## 2021-02-22 ASSESSMENT — MIFFLIN-ST. JEOR: SCORE: 1512.37

## 2021-02-22 NOTE — PROGRESS NOTES
[facility]  [address] [place] [address]  Phone: [phone]  Fax: [phone]  Primary Provider: Rashad Terry        PREOPERATIVE EVALUATION:  Today's date: 2/22/2021    Sae Milligan is a 75 year old male who presents for a preoperative evaluation.    Surgical Information:  Surgery/Procedure: Left arm- pull ligaments   Surgery Location: [facility]   Surgeon: Dr. Sood  Surgery Date: 3/18/21  Time of Surgery: TBD  Where patient plans to recover: At home with family  Fax number for surgical facility: [phone]    Type of Anesthesia Anticipated: to be determined        Subjective     HPI related to upcoming procedure: L shoulder injury, having surgery.        Preop Questions 2/22/2021   1. Have you ever had a heart attack or stroke? No   2. Have you ever had surgery on your heart or blood vessels, such as a stent placement, a coronary artery bypass, or surgery on an artery in your head, neck, heart, or legs? No   3. Do you have chest pain with activity? No   4. Do you have a history of  heart failure? No   5. Do you currently have a cold, bronchitis or symptoms of other infection? No   6. Do you have a cough, shortness of breath, or wheezing? No   7. Do you or anyone in your family have previous history of blood clots? YES - Himself, only happened once.    8. Do you or does anyone in your family have a serious bleeding problem such as prolonged bleeding following surgeries or cuts? No   9. Have you ever had problems with anemia or been told to take iron pills? No   10. Have you had any abnormal blood loss such as black, tarry or bloody stools? No   11. Have you ever had a blood transfusion? No   12. Are you willing to have a blood transfusion if it is medically needed before, during, or after your surgery? Yes   13. Have you or any of your relatives ever had problems with anesthesia? No   14. Do you have sleep apnea, excessive snoring or daytime drowsiness?  No   15. Do you have any artifical heart valves or other implanted medical devices like a pacemaker, defibrillator, or continuous glucose monitor? No   16. Do you have artificial joints? YES - bilateral knees   17. Are you allergic to latex? No     Health Care Directive:  Patient does not have a Health Care Directive or Living Will: Discussed advance care planning with patient; however, patient declined at this time.    6}    S: Sae Milligan is a 75 year old male with L shoulder injury, having surgery    DM2.  Med controlled.  Recheck A1C  Htn: stable on meds  Hyperlipidemia: stable on statin   Hyponatremia: runs around 130 chronically, recheck  CKD: stage 3.  Runs about 1.4 on Cr.  Will recheck.  Stable.       Review of Systems  Gen: no unexpected wt loss or fevers.    ENT: vision ok, no hearing changes, no lumps noted in neck or mouth  CV: no chest pain or SOB, no palpitations  RESP: No wheezing or pleuritic pain no cough  GI: no nausea or vomiting, no abd pain.  No blood in stool.   : no dysuria or hematuria. No urinary retention.  No lesions on genitals noted.    MUSC: moving all extremities, no edema  ENDO: no excessive thirst or urination.    NEURO: no difficulty speaking, no focal weakness or changes in sensation.  No balance troubles.   PSYCH: mood stable,  no significant problems with anxiety  SKIN: no worrisome rashes or lesions      Patient Active Problem List    Diagnosis Date Noted     Essential hypertension 01/11/2021     Priority: Medium     Limited on more meds by 50's on pulse on low dose beta blocker and hyponatremia requiring previous hospitalization on thiazide.        Hyponatremia 01/11/2021     Priority: Medium     Runs 130 on avg.  Hospitalization in [place] for low sodium when on diuretic.         CKD (chronic kidney disease) stage 3, GFR 30-59 ml/min 11/13/2019     Priority: Medium     Multiple gastric ulcers 08/26/2019     Priority: Medium     Seen on EGD for some epigastric pain.  None  with stigmata of bleeding . Started on PPI.  8/19       Benign prostatic hyperplasia with nocturia 06/03/2019     Priority: Medium     Anxiety 12/17/2013     Priority: Medium     Restless leg syndrome 12/02/2013     Priority: Medium     Family history of prostate cancer 05/13/2013     Priority: Medium     3 brothers  had prostate cancer       Squamous cell carcinoma 10/03/2012     Priority: Medium     Left helix. July 20, 2012       Allergic rhinitis 10/12/2011     Priority: Medium     DJD (degenerative joint disease) 02/09/2011     Priority: Medium     Type 2 diabetes mellitus with diabetic nephropathy (H) 03/14/2005     Priority: Medium              Hyperlipidemia LDL goal <100 03/14/2005     Priority: Medium     Had myalgias from simvastatin, were better immediately after stopping, tolerated Lipitor well       Health Care Home 06/23/2011     Priority: Low     EMERGENCY CARE PLAN  Presenting Problem Signs and Symptoms Treatment Plan    Questions or conerns during clinic hours    I will call the clinic directly     Questions or conerns outside clinic hours    I will call the 24 hour nurse line at [phone]    Patient needs to schedule an appointment    I will call the 24 hour scheduling team at [phone] or clinic directly    Same day treatment     I will call the clinic first, nurse line if after hours, urgent care and express care if needed                            DX V65.8 REPLACED WITH [id number] (04/08/2013)        Past Surgical History:   Procedure Laterality Date     ARTHROPLASTY KNEE  2/10/2011    ARTHROPLASTY KNEE performed by DARLING FARRELL at [facility]     COLONOSCOPY N/A 3/2/2018    Procedure: COLONOSCOPY;  colonoscopy;  Surgeon: Aris Whittaker MD;  Location: [facility]     ESOPHAGOSCOPY, GASTROSCOPY, DUODENOSCOPY (EGD), COMBINED N/A 7/19/2019    Procedure: ESOPHAGOGASTRODUODENOSCOPY, WITH BIOPSY;  Surgeon: John Christianson DO;  Location: [facility]     MOHS MICROGRAPHIC PROCEDURE  july  2012    Sq. cell left helix     ORTHOPEDIC SURGERY       SURGICAL HISTORY OF -   12/2003    Cervical spine fx     Current Outpatient Medications   Medication Sig Dispense Refill     ACCU-CHEK ANTONIA PLUS test strip USE 1 STRIP TO CHECK GLUCOSE ONCE DAILY 100 strip 0     ACCU-CHEK ANTONIA test strip USE ONE STRIP TO CHECK GLUCOSE ONCE DAILY 100 each 1     albuterol (PROAIR HFA/PROVENTIL HFA/VENTOLIN HFA) 108 (90 Base) MCG/ACT inhaler Inhale 2 puffs into the lungs every 6 hours as needed for shortness of breath / dyspnea or wheezing 1 Inhaler 1     amLODIPine (NORVASC) 5 MG tablet Take 2 tablets (10 mg) by mouth daily 180 tablet 1     aspirin (ASA) 81 MG tablet Take 1 tablet (81 mg) by mouth daily 30 tablet 0     atorvastatin (LIPITOR) 20 MG tablet Take 1 tablet (20 mg) by mouth daily 90 tablet 3     augmented betamethasone dipropionate (DIPROLENE-AF) 0.05 % external cream Apply to AA BID x 1-2 weeks then PRN only. Do not apply to face 50 g 2     B Complex Vitamins (VITAMIN B COMPLEX PO) Take 1 tablet by mouth daily.       CALCIUM CARBONATE-VITAMIN D 500-200 MG-UNIT OR TABS Take one tablet twice daily with food       cloNIDine (CATAPRES) 0.1 MG tablet Take 1 tablet (0.1 mg) by mouth 2 times daily 180 tablet 1     glipiZIDE (GLUCOTROL XL) 5 MG 24 hr tablet Take 1 tablet (5 mg) by mouth daily 90 tablet 1     lisinopril (ZESTRIL) 40 MG tablet TAKE 1 TABLET EVERY DAY 90 tablet 1     magnesium 500 MG TABS Take 500 mg by mouth daily       metFORMIN (GLUCOPHAGE) 500 MG tablet TAKE 2 TABLETS IN THE MORNING AND TAKE 2 TABLETS AT NIGHT 360 tablet 1     metoprolol tartrate (LOPRESSOR) 25 MG tablet Take 1 tablet (25 mg) by mouth 2 times daily 180 tablet 1     omeprazole (PRILOSEC) 20 MG DR capsule Take 1 capsule (20 mg) by mouth daily 90 capsule 3     Potassium Gluconate 595 MG CAPS Take 595 mg by mouth daily       Selenium 200 MCG TABS Take 200 mcg by mouth daily       tamsulosin (FLOMAX) 0.4 MG capsule Take 1 capsule (0.4 mg) by  "mouth daily 90 capsule 1     albuterol (PROVENTIL) (2.5 MG/3ML) 0.083% neb solution Take 1 vial (2.5 mg) by nebulization every 6 hours as needed for shortness of breath / dyspnea or wheezing (Patient not taking: Reported on 2021) 25 vial 1     fluticasone (FLONASE) 50 MCG/ACT nasal spray Spray 1-2 sprays into both nostrils daily (Patient not taking: Reported on 2021) 16 g 3     order for DME Nebulizer (Patient not taking: Reported on 2021) 1 Units 0       Allergies   Allergen Reactions     Penicillins Hives, Itching and Swelling     Of lips        Social History     Tobacco Use     Smoking status: Former Smoker     Quit date: 3/14/1990     Years since quittin.9     Smokeless tobacco: Never Used   Substance Use Topics     Alcohol use: Yes     Comment: occasional       History   Drug Use No         Objective     Physical Exam    /66   Pulse 61   Temp 97.8  F (36.6  C) (Tympanic)   Ht 1.676 m (5' 6\")   Wt 83.5 kg (184 lb)   SpO2 97%   BMI 29.70 kg/m    GEN: Alert and oriented, in no acute distress  CV: RRR, no murmur  RESP: lungs clear bilaterally, good effort  EXT: no edema or lesions noted in lower extremities  ABD: nontender.  No distention or mass appreciated.       Diagnostics:  First degree block on EKG, will fax  Will fax labs, A1C, Cr, etc.       Revised Cardiac Risk Index (RCRI):  The patient has the following serious cardiovascular risks for perioperative complications:   - No serious cardiac risks = 0 points     RCRI Interpretation: 0 points: Class I (very low risk - 0.4% complication rate)    A: pre op     L shoulder pain      DM2, stable       CKD, stable        Htn, stable         Low sodium, mild, stable          Hyperlipidemia, s table    Recheck labs.  No concerning sx, doing well.  He will stop ASA a week before, no glipizide AM of surgery, 1/2 dose metformin am of surgery.  Wrote this all down for him .    Proceed with procedure    Signed Electronically by: Rashad MARQUEZ " MD Mara  Copy of this evaluation report is provided to requesting physician.

## 2021-03-18 DIAGNOSIS — L30.0 NUMMULAR DERMATITIS: ICD-10-CM

## 2021-03-18 RX ORDER — BETAMETHASONE DIPROPIONATE 0.5 MG/G
CREAM TOPICAL
Qty: 50 G | Refills: 2 | Status: SHIPPED | OUTPATIENT
Start: 2021-03-18 | End: 2021-06-07

## 2021-03-18 NOTE — TELEPHONE ENCOUNTER
Requested Prescriptions   Pending Prescriptions Disp Refills     augmented betamethasone dipropionate (DIPROLENE-AF) 0.05 % external cream 50 g 2     Sig: Apply to AA BID x 1-2 weeks then PRN only. Do not apply to face       There is no refill protocol information for this order        Last office visit: 1/4/2021 with prescribing provider:  FRANCIA Garcia   Future Office Visit:          Denise Behrendt  Specialty CSS

## 2021-03-31 ENCOUNTER — OFFICE VISIT (OUTPATIENT)
Dept: FAMILY MEDICINE | Facility: CLINIC | Age: 76
End: 2021-03-31
Payer: MEDICARE

## 2021-03-31 VITALS
OXYGEN SATURATION: 99 % | RESPIRATION RATE: 16 BRPM | TEMPERATURE: 97.9 F | HEIGHT: 66 IN | WEIGHT: 178 LBS | SYSTOLIC BLOOD PRESSURE: 130 MMHG | HEART RATE: 54 BPM | DIASTOLIC BLOOD PRESSURE: 70 MMHG | BODY MASS INDEX: 28.61 KG/M2

## 2021-03-31 DIAGNOSIS — R10.31 ABDOMINAL PAIN, RIGHT LOWER QUADRANT: Primary | ICD-10-CM

## 2021-03-31 PROCEDURE — 99213 OFFICE O/P EST LOW 20 MIN: CPT | Performed by: FAMILY MEDICINE

## 2021-03-31 ASSESSMENT — ASTHMA QUESTIONNAIRES
QUESTION_2 LAST FOUR WEEKS HOW OFTEN HAVE YOU HAD SHORTNESS OF BREATH: ONCE A DAY
ACT_TOTALSCORE: 20
QUESTION_5 LAST FOUR WEEKS HOW WOULD YOU RATE YOUR ASTHMA CONTROL: WELL CONTROLLED
QUESTION_4 LAST FOUR WEEKS HOW OFTEN HAVE YOU USED YOUR RESCUE INHALER OR NEBULIZER MEDICATION (SUCH AS ALBUTEROL): NOT AT ALL
QUESTION_3 LAST FOUR WEEKS HOW OFTEN DID YOUR ASTHMA SYMPTOMS (WHEEZING, COUGHING, SHORTNESS OF BREATH, CHEST TIGHTNESS OR PAIN) WAKE YOU UP AT NIGHT OR EARLIER THAN USUAL IN THE MORNING: NOT AT ALL
QUESTION_1 LAST FOUR WEEKS HOW MUCH OF THE TIME DID YOUR ASTHMA KEEP YOU FROM GETTING AS MUCH DONE AT WORK, SCHOOL OR AT HOME: A LITTLE OF THE TIME

## 2021-03-31 ASSESSMENT — MIFFLIN-ST. JEOR: SCORE: 1485.15

## 2021-03-31 NOTE — PROGRESS NOTES
"A: lower abdominal pain, nos    P: reassured by lack of associated sx or any signs of illness.  Observe for now.  F/u if worsening or new sx.  He is OK with that plan.  Suspect lower abd wall muscle strain for now.     He knows of appendicitis as possibility if worsening/changing.  Talked about what to look for regarding that.          s :Sae Milligan is a 75 year old male with some lower abd pain.  Off/on for a few weeks.     Not sick, nofever or nausea.  No cough or sob.  No urine or stool changes of any sort.     Nothing makes it better or worse, no relation to food or BM or urination.     No renal stone hx      No pain with walking.  Seems to be worst in AM, \"loosens up\" as day goes on.    No positional component.  No pain in genitals.      O:/70   Pulse 54   Temp 97.9  F (36.6  C) (Tympanic)   Resp 16   Ht 1.676 m (5' 6\")   Wt 80.7 kg (178 lb)   SpO2 99%   BMI 28.73 kg/m    GEN: Alert and oriented, in no acute distress  GENITAL: No lesions on or around genitals.  No testicular mass, no spermatocele or epiditymits appreciated.  No hydrocele or varicocele.  No inguinal hernia.  Penis without drainage.    ABD: nontender.  No distention or mass appreciated, can't really find any pain on exam  Hips with full rom, no pain.  No pain on hip f lexors with resisted movement.      "

## 2021-04-01 ASSESSMENT — ASTHMA QUESTIONNAIRES: ACT_TOTALSCORE: 20

## 2021-04-03 ENCOUNTER — HEALTH MAINTENANCE LETTER (OUTPATIENT)
Age: 76
End: 2021-04-03

## 2021-04-28 DIAGNOSIS — K25.9 MULTIPLE GASTRIC ULCERS: ICD-10-CM

## 2021-04-30 ENCOUNTER — AMBULATORY - HEALTHEAST (OUTPATIENT)
Dept: SURGERY | Facility: HOSPITAL | Age: 76
End: 2021-04-30

## 2021-04-30 DIAGNOSIS — Z11.59 ENCOUNTER FOR SCREENING FOR OTHER VIRAL DISEASES: ICD-10-CM

## 2021-05-05 ENCOUNTER — RECORDS - HEALTHEAST (OUTPATIENT)
Dept: ADMINISTRATIVE | Facility: OTHER | Age: 76
End: 2021-05-05

## 2021-05-06 ENCOUNTER — RECORDS - HEALTHEAST (OUTPATIENT)
Dept: ADMINISTRATIVE | Facility: OTHER | Age: 76
End: 2021-05-06

## 2021-05-07 DIAGNOSIS — Z11.59 ENCOUNTER FOR SCREENING FOR OTHER VIRAL DISEASES: Primary | ICD-10-CM

## 2021-05-10 ENCOUNTER — OFFICE VISIT (OUTPATIENT)
Dept: FAMILY MEDICINE | Facility: CLINIC | Age: 76
End: 2021-05-10
Payer: MEDICARE

## 2021-05-10 VITALS
BODY MASS INDEX: 28.93 KG/M2 | SYSTOLIC BLOOD PRESSURE: 118 MMHG | HEART RATE: 56 BPM | OXYGEN SATURATION: 98 % | WEIGHT: 180 LBS | HEIGHT: 66 IN | TEMPERATURE: 97.2 F | DIASTOLIC BLOOD PRESSURE: 60 MMHG

## 2021-05-10 DIAGNOSIS — N18.30 STAGE 3 CHRONIC KIDNEY DISEASE, UNSPECIFIED WHETHER STAGE 3A OR 3B CKD (H): ICD-10-CM

## 2021-05-10 DIAGNOSIS — R35.1 BENIGN PROSTATIC HYPERPLASIA WITH NOCTURIA: ICD-10-CM

## 2021-05-10 DIAGNOSIS — N40.1 BENIGN PROSTATIC HYPERPLASIA WITH NOCTURIA: ICD-10-CM

## 2021-05-10 DIAGNOSIS — I10 ESSENTIAL HYPERTENSION: ICD-10-CM

## 2021-05-10 DIAGNOSIS — E87.1 HYPONATREMIA: ICD-10-CM

## 2021-05-10 DIAGNOSIS — E11.21 TYPE 2 DIABETES MELLITUS WITH DIABETIC NEPHROPATHY, WITHOUT LONG-TERM CURRENT USE OF INSULIN (H): ICD-10-CM

## 2021-05-10 DIAGNOSIS — Z01.818 PREOP GENERAL PHYSICAL EXAM: Primary | ICD-10-CM

## 2021-05-10 DIAGNOSIS — E78.5 HYPERLIPIDEMIA LDL GOAL <100: ICD-10-CM

## 2021-05-10 PROBLEM — R39.11 BENIGN PROSTATIC HYPERPLASIA WITH URINARY HESITANCY: Status: RESOLVED | Noted: 2021-05-10 | Resolved: 2021-05-10

## 2021-05-10 PROBLEM — R39.11 BENIGN PROSTATIC HYPERPLASIA WITH URINARY HESITANCY: Status: ACTIVE | Noted: 2021-05-10

## 2021-05-10 LAB
ANION GAP SERPL CALCULATED.3IONS-SCNC: 9 MMOL/L (ref 3–14)
BUN SERPL-MCNC: 23 MG/DL (ref 7–30)
CALCIUM SERPL-MCNC: 8.6 MG/DL (ref 8.5–10.1)
CHLORIDE SERPL-SCNC: 95 MMOL/L (ref 94–109)
CO2 SERPL-SCNC: 23 MMOL/L (ref 20–32)
CREAT SERPL-MCNC: 1.48 MG/DL (ref 0.66–1.25)
GFR SERPL CREATININE-BSD FRML MDRD: 45 ML/MIN/{1.73_M2}
GLUCOSE SERPL-MCNC: 119 MG/DL (ref 70–99)
POTASSIUM SERPL-SCNC: 4.5 MMOL/L (ref 3.4–5.3)
SODIUM SERPL-SCNC: 127 MMOL/L (ref 133–144)

## 2021-05-10 PROCEDURE — 36415 COLL VENOUS BLD VENIPUNCTURE: CPT | Performed by: FAMILY MEDICINE

## 2021-05-10 PROCEDURE — 99214 OFFICE O/P EST MOD 30 MIN: CPT | Performed by: FAMILY MEDICINE

## 2021-05-10 PROCEDURE — 80048 BASIC METABOLIC PNL TOTAL CA: CPT | Performed by: FAMILY MEDICINE

## 2021-05-10 ASSESSMENT — MIFFLIN-ST. JEOR: SCORE: 1494.22

## 2021-05-10 NOTE — PROGRESS NOTES
[facility]  [address] [place] [id number]  Phone: [phone]  Fax: [phone]  Primary Provider: Rashad Terry        PREOPERATIVE EVALUATION:  Today's date: 5/10/2021    Sae Milligan is a 75 year old male who presents for a preoperative evaluation.    Surgical Information:  Surgery/Procedure: CYSTOSCOPY, BIPOLAR TRANSURETHRAL RESECTION OF PROSTATE  Surgery Location: [facility]   Surgeon: Dr. Hawkins   Surgery Date: 5/17/2021  Time of Surgery:   Where patient plans to recover: At home with family  Fax number for surgical facility:     Type of Anesthesia Anticipated: to be determined        Subjective     HPI related to upcoming procedure: BpH.  Having prostate laser procedure     Preop Questions 5/10/2021   1. Have you ever had a heart attack or stroke? No   2. Have you ever had surgery on your heart or blood vessels, such as a stent placement, a coronary artery bypass, or surgery on an artery in your head, neck, heart, or legs? No   3. Do you have chest pain with activity? No   4. Do you have a history of  heart failure? No   5. Do you currently have a cold, bronchitis or symptoms of other infection? No   6. Do you have a cough, shortness of breath, or wheezing? No   7. Do you or anyone in your family have previous history of blood clots? YES - 30 years ago had blood clots    8. Do you or does anyone in your family have a serious bleeding problem such as prolonged bleeding following surgeries or cuts? No   9. Have you ever had problems with anemia or been told to take iron pills? No   10. Have you had any abnormal blood loss such as black, tarry or bloody stools? No   11. Have you ever had a blood transfusion? No   12. Are you willing to have a blood transfusion if it is medically needed before, during, or after your surgery? Yes   13. Have you or any of your relatives ever had problems with anesthesia? No   14. Do you have sleep apnea,  excessive snoring or daytime drowsiness? No   15. Do you have any artifical heart valves or other implanted medical devices like a pacemaker, defibrillator, or continuous glucose monitor? No   16. Do you have artificial joints? YES - knee   17. Are you allergic to latex? No       Health Care Directive:  Patient does not have a Health Care Directive or Living Will: Advance Directive received and scanned. Click on Code in the patient header to view.    Preoperative Review of :   reviewed - no current substances     DM2.  Med controlled.  good A1C.  No insulin.   Htn: stable on meds  Hyperlipidemia: stable on statin   Hyponatremia: runs around 130 chronically, recheck  CKD: stage 3.  Runs about 1.4 on Cr.  Will recheck.  Stable.       Review of Systems  No cp or sob or gi, gu issues of concern or LE edema or abd pain fever or cough.     Patient Active Problem List    Diagnosis Date Noted     Essential hypertension 01/11/2021     Priority: Medium     Limited on more meds by 50's on pulse on low dose beta blocker and hyponatremia requiring previous hospitalization on thiazide.        Hyponatremia 01/11/2021     Priority: Medium     Runs 130 on avg.  Hospitalization in [place] for low sodium when on diuretic.         CKD (chronic kidney disease) stage 3, GFR 30-59 ml/min 11/13/2019     Priority: Medium     Multiple gastric ulcers 08/26/2019     Priority: Medium     Seen on EGD for some epigastric pain.  None with stigmata of bleeding . Started on PPI.  8/19       Benign prostatic hyperplasia with nocturia 06/03/2019     Priority: Medium     Anxiety 12/17/2013     Priority: Medium     Restless leg syndrome 12/02/2013     Priority: Medium     Family history of prostate cancer 05/13/2013     Priority: Medium     3 brothers  had prostate cancer       Squamous cell carcinoma 10/03/2012     Priority: Medium     Left helix. July 20, 2012       Allergic rhinitis 10/12/2011     Priority: Medium     DJD (degenerative joint  disease) 02/09/2011     Priority: Medium     Type 2 diabetes mellitus with diabetic nephropathy (H) 03/14/2005     Priority: Medium              Hyperlipidemia LDL goal <100 03/14/2005     Priority: Medium     Had myalgias from simvastatin, were better immediately after stopping, tolerated Lipitor well       Health Care Home 06/23/2011     Priority: Low     EMERGENCY CARE PLAN  Presenting Problem Signs and Symptoms Treatment Plan    Questions or conerns during clinic hours    I will call the clinic directly     Questions or conerns outside clinic hours    I will call the 24 hour nurse line at [phone]    Patient needs to schedule an appointment    I will call the 24 hour scheduling team at [phone] or clinic directly    Same day treatment     I will call the clinic first, nurse line if after hours, urgent care and express care if needed                            DX V65.8 REPLACED WITH [id number] [facility] (04/08/2013)          Past Surgical History:   Procedure Laterality Date     ARTHROPLASTY KNEE  2/10/2011    ARTHROPLASTY KNEE performed by DARLING FARRELL at [facility]     COLONOSCOPY N/A 3/2/2018    Procedure: COLONOSCOPY;  colonoscopy;  Surgeon: Aris Whittaker MD;  Location: [facility]     ESOPHAGOSCOPY, GASTROSCOPY, DUODENOSCOPY (EGD), COMBINED N/A 7/19/2019    Procedure: ESOPHAGOGASTRODUODENOSCOPY, WITH BIOPSY;  Surgeon: John Christianson DO;  Location: [facility]     MOHS MICROGRAPHIC PROCEDURE  july 2012    Sq. cell left helix     ORTHOPEDIC SURGERY       SURGICAL HISTORY OF -   12/2003    Cervical spine fx     Current Outpatient Medications   Medication Sig Dispense Refill     ACCU-CHEK ANTONIA PLUS test strip USE 1 STRIP TO CHECK GLUCOSE ONCE DAILY 100 strip 0     ACCU-CHEK ANTONIA test strip USE ONE STRIP TO CHECK GLUCOSE ONCE DAILY 100 each 1     albuterol (PROAIR HFA/PROVENTIL HFA/VENTOLIN HFA) 108 (90 Base) MCG/ACT inhaler Inhale 2 puffs into the lungs every 6 hours as needed for shortness of breath /  dyspnea or wheezing (Patient not taking: Reported on 3/31/2021) 1 Inhaler 1     albuterol (PROVENTIL) (2.5 MG/3ML) 0.083% neb solution Take 1 vial (2.5 mg) by nebulization every 6 hours as needed for shortness of breath / dyspnea or wheezing (Patient not taking: Reported on 2/22/2021) 25 vial 1     amLODIPine (NORVASC) 5 MG tablet Take 2 tablets (10 mg) by mouth daily 180 tablet 1     aspirin (ASA) 81 MG tablet Take 1 tablet (81 mg) by mouth daily 30 tablet 0     atorvastatin (LIPITOR) 20 MG tablet TAKE 1 TABLET EVERY DAY 90 tablet 0     augmented betamethasone dipropionate (DIPROLENE-AF) 0.05 % external cream Apply to AA BID x 1-2 weeks then PRN only. Do not apply to face 50 g 2     B Complex Vitamins (VITAMIN B COMPLEX PO) Take 1 tablet by mouth daily.       CALCIUM CARBONATE-VITAMIN D 500-200 MG-UNIT OR TABS Take one tablet twice daily with food       cloNIDine (CATAPRES) 0.1 MG tablet Take 1 tablet (0.1 mg) by mouth 2 times daily 180 tablet 1     fluticasone (FLONASE) 50 MCG/ACT nasal spray Spray 1-2 sprays into both nostrils daily (Patient not taking: Reported on 1/11/2021) 16 g 3     glipiZIDE (GLUCOTROL XL) 5 MG 24 hr tablet Take 1 tablet (5 mg) by mouth daily 90 tablet 1     lisinopril (ZESTRIL) 40 MG tablet TAKE 1 TABLET EVERY DAY 90 tablet 1     magnesium 500 MG TABS Take 500 mg by mouth daily       metFORMIN (GLUCOPHAGE) 500 MG tablet TAKE 2 TABLETS IN THE MORNING AND TAKE 2 TABLETS AT NIGHT 360 tablet 1     metoprolol tartrate (LOPRESSOR) 25 MG tablet Take 1 tablet (25 mg) by mouth 2 times daily 180 tablet 1     omeprazole (PRILOSEC) 20 MG DR capsule TAKE 1 CAPSULE EVERY DAY 90 capsule 3     order for DME Nebulizer (Patient not taking: Reported on 2/22/2021) 1 Units 0     Potassium Gluconate 595 MG CAPS Take 595 mg by mouth daily       Selenium 200 MCG TABS Take 200 mcg by mouth daily       tamsulosin (FLOMAX) 0.4 MG capsule Take 1 capsule (0.4 mg) by mouth daily 90 capsule 1       Allergies   Allergen  Reactions     Penicillins Hives, Itching and Swelling     Of lips        Social History     Tobacco Use     Smoking status: Former Smoker     Quit date: 3/14/1990     Years since quittin.1     Smokeless tobacco: Never Used   Substance Use Topics     Alcohol use: Yes     Comment: occasional       History   Drug Use No         Objective     There were no vitals taken for this visit.    Physical Exam  GEN: Alert and oriented, in no acute distress  CV: RRR, no murmur  RESP: lungs clear bilaterally, good effort  Neck: neck supple without mass or lymphadenopathy  EXT: no edema or lesions noted in lower extremities      Recent Labs   Lab Test 21  1101 20  1154 20  1049   * 129* 126*   POTASSIUM 5.1 4.7 4.9   CR 1.44* 1.38* 1.40*   A1C 5.4  --  5.7*        Diagnostics:  Reviewed EKG within year, normal  Basic met pending    Addendum: 127 on sodium, which he sometimes runs.  No concerns, stable for him, no diuretic use.        Revised Cardiac Risk Index (RCRI):  The patient has the following serious cardiovascular risks for perioperative complications:   - No serious cardiac risks = 0 points     RCRI Interpretation: 0 points: Class I (very low risk - 0.4% complication rate)    A: pre op       BpH     DM2.  Med controlled.  Recheck A1C  Htn: stable on meds  Hyperlipidemia: stable on statin   Hyponatremia: runs around 130 chronically, recheck  CKD: stage 3.  Runs about 1.4 on Cr.  Will recheck.  Stable      P: no further evaluation indicated for pre op.  Proceed with surgery.  He has stopped ASA and will not take his metformin or glipizide AM of surgery.  He knows this and it was written down for him .    Signed Electronically by: Rashad Terry MD  Copy of this evaluation report is provided to requesting physician.

## 2021-05-10 NOTE — PATIENT INSTRUCTIONS

## 2021-05-10 NOTE — LETTER
May 10, 2021      Sae Milligan  [address] [facility] [address]        Dear ,    We are writing to inform you of your test results.    Labs stable.  I hope the surgery goes well.       If you have any questions or concerns, please call the clinic at the number listed above.       Sincerely,      Rashad Terry MD/ ss      Resulted Orders   Basic metabolic panel  (Ca, Cl, CO2, Creat, Gluc, K, Na, BUN)   Result Value Ref Range    Sodium 127 (L) 133 - 144 mmol/L    Potassium 4.5 3.4 - 5.3 mmol/L    Chloride 95 94 - 109 mmol/L    Carbon Dioxide 23 20 - 32 mmol/L    Anion Gap 9 3 - 14 mmol/L    Glucose 119 (H) 70 - 99 mg/dL    Urea Nitrogen 23 7 - 30 mg/dL    Creatinine 1.48 (H) 0.66 - 1.25 mg/dL    GFR Estimate 45 (L) >60 mL/min/[1.73_m2]      Comment:      Non  GFR Calc  Starting 12/18/2018, serum creatinine based estimated GFR (eGFR) will be   calculated using the Chronic Kidney Disease Epidemiology Collaboration   (CKD-EPI) equation.      GFR Estimate If Black 53 (L) >60 mL/min/[1.73_m2]      Comment:       GFR Calc  Starting 12/18/2018, serum creatinine based estimated GFR (eGFR) will be   calculated using the Chronic Kidney Disease Epidemiology Collaboration   (CKD-EPI) equation.      Calcium 8.6 8.5 - 10.1 mg/dL

## 2021-05-13 ENCOUNTER — RECORDS - HEALTHEAST (OUTPATIENT)
Dept: ADMINISTRATIVE | Facility: OTHER | Age: 76
End: 2021-05-13

## 2021-05-13 DIAGNOSIS — Z11.59 ENCOUNTER FOR SCREENING FOR OTHER VIRAL DISEASES: ICD-10-CM

## 2021-05-13 LAB
LABORATORY COMMENT REPORT: NORMAL
SARS-COV-2 RNA RESP QL NAA+PROBE: NEGATIVE
SARS-COV-2 RNA RESP QL NAA+PROBE: NORMAL
SPECIMEN SOURCE: NORMAL
SPECIMEN SOURCE: NORMAL

## 2021-05-13 PROCEDURE — U0005 INFEC AGEN DETEC AMPLI PROBE: HCPCS | Performed by: UROLOGY

## 2021-05-13 PROCEDURE — U0003 INFECTIOUS AGENT DETECTION BY NUCLEIC ACID (DNA OR RNA); SEVERE ACUTE RESPIRATORY SYNDROME CORONAVIRUS 2 (SARS-COV-2) (CORONAVIRUS DISEASE [COVID-19]), AMPLIFIED PROBE TECHNIQUE, MAKING USE OF HIGH THROUGHPUT TECHNOLOGIES AS DESCRIBED BY CMS-2020-01-R: HCPCS | Performed by: UROLOGY

## 2021-05-15 ENCOUNTER — COMMUNICATION - HEALTHEAST (OUTPATIENT)
Dept: SCHEDULING | Facility: CLINIC | Age: 76
End: 2021-05-15

## 2021-05-17 ENCOUNTER — RECORDS - HEALTHEAST (OUTPATIENT)
Dept: ADMINISTRATIVE | Facility: OTHER | Age: 76
End: 2021-05-17

## 2021-05-17 ENCOUNTER — SURGERY - HEALTHEAST (OUTPATIENT)
Dept: SURGERY | Facility: HOSPITAL | Age: 76
End: 2021-05-17
Payer: MEDICARE

## 2021-05-17 ENCOUNTER — ANESTHESIA - HEALTHEAST (OUTPATIENT)
Dept: SURGERY | Facility: HOSPITAL | Age: 76
End: 2021-05-17

## 2021-05-17 ASSESSMENT — MIFFLIN-ST. JEOR: SCORE: 1471.08

## 2021-05-18 ENCOUNTER — RECORDS - HEALTHEAST (OUTPATIENT)
Dept: ADMINISTRATIVE | Facility: OTHER | Age: 76
End: 2021-05-18

## 2021-05-18 ASSESSMENT — MIFFLIN-ST. JEOR: SCORE: 1501.47

## 2021-05-27 VITALS — WEIGHT: 182.7 LBS | BODY MASS INDEX: 29.49 KG/M2

## 2021-05-27 VITALS — BODY MASS INDEX: 29.05 KG/M2 | HEIGHT: 66 IN

## 2021-05-27 VITALS — BODY MASS INDEX: 28.41 KG/M2 | WEIGHT: 176 LBS

## 2021-06-02 ENCOUNTER — OFFICE VISIT (OUTPATIENT)
Dept: FAMILY MEDICINE | Facility: CLINIC | Age: 76
End: 2021-06-02
Payer: MEDICARE

## 2021-06-02 VITALS
HEIGHT: 66 IN | DIASTOLIC BLOOD PRESSURE: 60 MMHG | WEIGHT: 182 LBS | SYSTOLIC BLOOD PRESSURE: 138 MMHG | HEART RATE: 66 BPM | BODY MASS INDEX: 29.25 KG/M2 | TEMPERATURE: 96.8 F | OXYGEN SATURATION: 98 %

## 2021-06-02 DIAGNOSIS — N40.1 BENIGN PROSTATIC HYPERPLASIA WITH NOCTURIA: Primary | ICD-10-CM

## 2021-06-02 DIAGNOSIS — R35.1 BENIGN PROSTATIC HYPERPLASIA WITH NOCTURIA: Primary | ICD-10-CM

## 2021-06-02 DIAGNOSIS — I10 ESSENTIAL HYPERTENSION: ICD-10-CM

## 2021-06-02 PROCEDURE — 99214 OFFICE O/P EST MOD 30 MIN: CPT | Performed by: FAMILY MEDICINE

## 2021-06-02 ASSESSMENT — MIFFLIN-ST. JEOR: SCORE: 1503.3

## 2021-06-02 NOTE — PROGRESS NOTES
"      Subjective   Sae is a 75 year old who presents for the following health issuesI       Hospital Follow-up Visit:    Hospital/Nursing Home/IP Rehab Facility: [facility]  Date of Admission: 5/17/21  Date of Discharge: 5/18/2021  Reason(s) for Admission:     Cystoscopy, bipolar transurethral resection of prostate     Was your hospitalization related to COVID-19? No   Problems taking medications regularly:  None  Medication changes since discharge:   acetaminophen 500 MG tablet  Dose: 500-1,000 mg  Commonly known as: TYLENOL  500-1,000 mg, Oral, Every 6 hours PRN    HYDROcodone-acetaminophen 5-325 mg per tablet  Quantity: 8 tablet  Dose: 1-2 tablet  1-2 tablets, Oral, Every 6 hours PRN    neomycin-bacitracin-polymyxin ointment  Quantity: 15 g  Commonly known as: NEOSPORIN  Apply to penis at catheter insertion site three times a day while catheter is in place.    polyethylene glycol 17 gram packet  Quantity: 14 packet  Dose: 17 g  Commonly known as: MIRALAX  17 g, Oral, DAILY, Discontinue if your stools become loose.     Stop taking:  Metoprolol tartrate 25mg   Problems adhering to non-medication therapy:  None    Summary of hospitalization:  [facility] discharge summary reviewed  Diagnostic Tests/Treatments reviewed.  Follow up needed: none  Other Healthcare Providers Involved in Patient s Care:         None  Update since discharge: improved.  Post Discharge Medication Reconciliation: done  Plan of care communicated with patient          S: Sae Milligan is a 75 year old male with TURP.  Improved, happy.  Had some post op asif, but BP up a little now.  He wants it back down, wants to get back on med.  Was tolerating low dose 25mg bid before surgery    No cp or sob    Nocturia much improved    O:/60   Pulse 66   Temp 96.8  F (36  C) (Tympanic)   Ht 1.676 m (5' 6\")   Wt 82.6 kg (182 lb)   SpO2 98%   BMI 29.38 kg/m    GEN: Alert and oriented, in no acute distress  CV: " RRR, no murmur  EXT: no edema or lesions noted in lower extremities    A: BPH, improved after surgery       Hypertension, up a little off metoprolol    P: back on 25mg bid metoprolol.  F/u if lightheaded, dizzy.  Back in 6 mo for DM2 visit.

## 2021-06-07 DIAGNOSIS — L30.0 NUMMULAR DERMATITIS: ICD-10-CM

## 2021-06-07 RX ORDER — BETAMETHASONE DIPROPIONATE 0.5 MG/G
CREAM TOPICAL
Qty: 50 G | Refills: 2 | Status: SHIPPED | OUTPATIENT
Start: 2021-06-07

## 2021-06-12 NOTE — ANESTHESIA POSTPROCEDURE EVALUATION
Patient: Sae Milligan  Procedure(s):  CYSTOSCOPY  Anesthesia type: MAC    Patient location: PACU  Last vitals:   Vitals Value Taken Time   /62 10/12/20 1115   Temp 37.5  C (99.5  F) 10/12/20 1056   Pulse 68 10/12/20 1125   Resp 16 10/12/20 1056   SpO2 98 % 10/12/20 1125   Vitals shown include unvalidated device data.  Post vital signs: stable  Level of consciousness: awake and responds to simple questions  Post-anesthesia pain: pain controlled  Post-anesthesia nausea and vomiting: no  Pulmonary: unassisted, return to baseline  Cardiovascular: stable and blood pressure at baseline  Hydration: adequate  Anesthetic events: no    QCDR Measures:  ASA# 11 - Karley-op Cardiac Arrest: ASA11B - Patient did NOT experience unanticipated cardiac arrest  ASA# 12 - Karley-op Mortality Rate: ASA12B - Patient did NOT die  ASA# 13 - PACU Re-Intubation Rate: NA - No ETT / LMA used for case  ASA# 10 - Composite Anes Safety: ASA10A - No serious adverse event    Additional Notes:

## 2021-06-12 NOTE — ANESTHESIA PREPROCEDURE EVALUATION
Anesthesia Evaluation      Patient summary reviewed   No history of anesthetic complications     Airway   Mallampati: III  Neck ROM: full   Pulmonary - normal exam    breath sounds clear to auscultation  (+) a smoker                         Cardiovascular - normal exam  Exercise tolerance: > or = 4 METS  ECG reviewed  Rhythm: regular  Rate: normal,         Neuro/Psych - negative ROS     Endo/Other    (+) diabetes mellitus type 2,      GI/Hepatic/Renal    (+) GERD,   chronic renal disease CRI,           Dental    (+) poor dentition and chipped                       Anesthesia Plan  Planned anesthetic: MAC  Risk and benefit of mac vs ga d/w with patient.  Patient acknowledges that this anesthetic isn't a GA and that it is possible and normal to recall intraop events.      Fire precautions  ASA 3     Anesthetic plan and risks discussed with: patient  Anesthesia plan special considerations: antiemetics,   Post-op plan: routine recovery

## 2021-06-12 NOTE — ANESTHESIA CARE TRANSFER NOTE
Last vitals:   Vitals:    10/12/20 1056   BP: 130/60   Pulse: 61   Resp: 16   Temp: 37.5  C (99.5  F)   SpO2: 100%     Patient's level of consciousness is drowsy  Spontaneous respirations: yes  Maintains airway independently: yes  Dentition unchanged: yes  Oropharynx: oropharynx clear of all foreign objects    QCDR Measures:  ASA# 20 - Surgical Safety Checklist: WHO surgical safety checklist completed prior to induction    PQRS# 430 - Adult PONV Prevention: 4558F - Pt received => 2 anti-emetic agents (different classes) preop & intraop  ASA# 8 - Peds PONV Prevention: NA - Not pediatric patient, not GA or 2 or more risk factors NOT present  PQRS# 424 - Karley-op Temp Management: 4559F - At least one body temp DOCUMENTED => 35.5C or 95.9F within required timeframe  PQRS# 426 - PACU Transfer Protocol: - Transfer of care checklist used  ASA# 14 - Acute Post-op Pain: ASA14B - Patient did NOT experience pain >= 7 out of 10

## 2021-06-17 NOTE — ANESTHESIA CARE TRANSFER NOTE
Last vitals:   Vitals:    05/17/21 1333   BP: 159/71   Pulse: 66   Resp: 16   Temp: 36.3  C (97.3  F)   SpO2: 100%     Patient's level of consciousness is drowsy  Spontaneous respirations: yes  Maintains airway independently: yes  Dentition unchanged: yes  Oropharynx: oropharynx clear of all foreign objects    QCDR Measures:  ASA# 20 - Surgical Safety Checklist: WHO surgical safety checklist completed prior to induction    PQRS# 430 - Adult PONV Prevention: 4558F - Pt received => 2 anti-emetic agents (different classes) preop & intraop  ASA# 8 - Peds PONV Prevention: NA - Not pediatric patient, not GA or 2 or more risk factors NOT present  PQRS# 424 - Karley-op Temp Management: 4559F - At least one body temp DOCUMENTED => 35.5C or 95.9F within required timeframe  PQRS# 426 - PACU Transfer Protocol: - Transfer of care checklist used  ASA# 14 - Acute Post-op Pain: ASA14B - Patient did NOT experience pain >= 7 out of 10

## 2021-06-17 NOTE — ANESTHESIA PREPROCEDURE EVALUATION
Anesthesia Evaluation      Patient summary reviewed   No history of anesthetic complications     Airway   Mallampati: II   Pulmonary - normal exam   (+) asthma                           Cardiovascular - normal exam  (+) hypertension, ,     ECG reviewed        Neuro/Psych      Endo/Other    (+) diabetes mellitus,      GI/Hepatic/Renal    (+)   chronic renal disease,           Dental - normal exam                        Anesthesia Plan  Planned anesthetic: general LMA    ASA 2   Induction: intravenous   Anesthetic plan and risks discussed with: patient  Anesthesia plan special considerations: antiemetics,   Post-op plan: routine recovery

## 2021-06-17 NOTE — ANESTHESIA POSTPROCEDURE EVALUATION
Patient: Sae Mililgan  Procedure(s):  CYSTOSCOPY, BIPOLAR TRANSURETHRAL RESECTION OF PROSTATE  Anesthesia type: general    Patient location: PACU  Last vitals:   Vitals Value Taken Time   /60 05/17/21 1445   Temp 36.6  C (97.9  F) 05/17/21 1420   Pulse 67 05/17/21 1455   Resp 17 05/17/21 1454   SpO2 96 % 05/17/21 1455   Vitals shown include unvalidated device data.  Post vital signs: stable  Level of consciousness: awake and responds to simple questions  Post-anesthesia pain: pain controlled  Post-anesthesia nausea and vomiting: no  Pulmonary: unassisted  Cardiovascular: stable  Hydration: adequate  Anesthetic events: no    QCDR Measures:  ASA# 11 - Karley-op Cardiac Arrest: ASA11B - Patient did NOT experience unanticipated cardiac arrest  ASA# 12 - Karley-op Mortality Rate: ASA12B - Patient did NOT die  ASA# 13 - PACU Re-Intubation Rate: ASA13B - Patient did NOT require a new airway mgmt  ASA# 10 - Composite Anes Safety: ASA10A - No serious adverse event    Additional Notes:

## 2021-06-28 ENCOUNTER — OFFICE VISIT (OUTPATIENT)
Dept: FAMILY MEDICINE | Facility: CLINIC | Age: 76
End: 2021-06-28
Payer: MEDICARE

## 2021-06-28 VITALS
BODY MASS INDEX: 29.25 KG/M2 | OXYGEN SATURATION: 98 % | WEIGHT: 182 LBS | HEIGHT: 66 IN | DIASTOLIC BLOOD PRESSURE: 70 MMHG | RESPIRATION RATE: 18 BRPM | SYSTOLIC BLOOD PRESSURE: 154 MMHG | HEART RATE: 59 BPM | TEMPERATURE: 97.7 F

## 2021-06-28 DIAGNOSIS — I10 ESSENTIAL HYPERTENSION: ICD-10-CM

## 2021-06-28 DIAGNOSIS — I10 BENIGN ESSENTIAL HYPERTENSION: ICD-10-CM

## 2021-06-28 DIAGNOSIS — R60.0 PEDAL EDEMA: Primary | ICD-10-CM

## 2021-06-28 PROCEDURE — 99213 OFFICE O/P EST LOW 20 MIN: CPT | Performed by: FAMILY MEDICINE

## 2021-06-28 ASSESSMENT — MIFFLIN-ST. JEOR: SCORE: 1503.3

## 2021-06-28 NOTE — PROGRESS NOTES
Assessment & Plan     Pedal edema  75-year-old male presented with bilateral pedal edema, right> left, experiencing for last 1 week or so.  Differentials discussed in detail including venous insufficiency, DVT and medication related side effect.  Well score 0.  History of CKD stage III.  Will do ultrasound Doppler for further evaluation.  Suggested leg elevation when possible, limiting salt, regular walks, weight loss and compression stockings.  Other medications reviewed and no changes made.  Follow-up in 2 weeks or earlier if needed.  - US Lower Extremity Venous Duplex Right; Future  - Compression Sleeve/Stocking Order for DME - ONLY FOR DME    Essential hypertension  As per patient home blood pressure readings within target goal of less than 140/90.  Suggested to continue amlodipine, clonidine, lisinopril and metoprolol        Patient Instructions   Please call [phone] to schedule U/S right leg.       Patient Education     Leg Swelling in Both Legs    Swelling of the feet, ankles, and legs is called edema. It is caused by excess fluid that has collected in the tissues. Extra fluid in the body settles in the lowest part because of gravity. This is why the legs and feet are most affected.  Some of the causes for edema include:    Disease of the heart such as congestive heart failure    Standing or sitting for long periods of time    Infection of the feet or legs    Blood pooling in the veins of your legs (venous insufficiency) when the veins have less elasticity    Dilated veins in your lower leg (varicose veins)    Stockings or other clothing that is tight on your legs. This will cause blood to pool in your legs because the clothing limits blood flow.    Some medicines. These include some hormones such as birth control pills, some blood pressure medicines such as calcium channel blockers, steroids, and some antidepressants such as MAO inhibitors and tricyclics.    Menstrual periods that cause you to retain  fluids    Many types of kidney disease    Liver failure or cirrhosis    Pregnancy. Some swelling is normal, but a sudden increase in leg swelling or weight gain can be a sign of a dangerous complication of pregnancy called eclampsia.    Poor nutrition    Thyroid disease  Treatment will depend on what is causing the swelling in your legs. Your healthcare provider may prescribe water pills (diuretics) to get rid of the extra fluid.  Home care  Follow these guidelines when caring for yourself at home:    Don't wear clothing such as stockings that are tight on your legs.    Keep your legs up while lying or sitting.    If infection, injury, or recent surgery is causing the swelling, stay off your legs as much as possible until symptoms get better.    If your healthcare provider says that your leg swelling is caused by venous insufficiency or varicose veins, don't sit or  one place for long periods of time. Take breaks and walk about every few hours. Brisk walking is a good exercise. It helps circulate the blood that has collected in your leg. Talk with your provider about using support stockings to stop daytime leg swelling.    If your provider says that heart disease is causing your leg swelling, follow a low-salt diet to stop extra fluid from staying in your body. You may also need medicine.  Follow-up care  Follow up with your healthcare provider, or as advised.  When to seek medical advice  Call your healthcare provider right away if any of these occur:    Swelling in both legs or ankles that gets worse    Swelling of the abdomen    Redness, warmth, or swelling in one leg    Fever of 100.4 F (38 C) or higher , or as directed by your healthcare provider    Yellow color to your skin or eyes    Rapid, unexplained weight gain    Having to sleep upright or use an increased number of pillow     Call 911  This is the fastest and safest way to get to the emergency department. The paramedics can also start treatment  "on the way to the hospital, if needed.  Call 911, or seekmedical attention right away if    You have new shortness of breath or chest pain    Worsening shortness of breath or chest pain?  [employer] last reviewed this educational content on 11/1/2019 2000-2021 [employer]. All rights reserved. This information is not intended as a substitute for professional medical care. Always follow your healthcare professional's instructions.               Return in about 2 weeks (around 7/12/2021).    Candelario Mccormick MD  [facility]    Zeina Quevedo is a 75 year old who presents for the following health issues     HPI     Concern - Leg swelling   Onset: about a week   Description: bilateral leg swelling. Right leg seems to be worse.  Has some mild pain in the back of the right leg as well.   Intensity: moderate  Progression of Symptoms:  same  Accompanying Signs & Symptoms:   Previous history of similar problem: none  Therapies tried and outcome: Does elevate legs while watching tv in his recliner       Review of Systems   Constitutional, HEENT, cardiovascular, pulmonary, GI, , musculoskeletal, neuro, skin, endocrine and psych systems are negative, except as otherwise noted.      Objective    BP (!) 154/70 (Cuff Size: Adult Regular)   Pulse 59   Temp 97.7  F (36.5  C) (Tympanic)   Resp 18   Ht 1.676 m (5' 6\")   Wt 82.6 kg (182 lb)   SpO2 98%   BMI 29.38 kg/m    Body mass index is 29.38 kg/m .  Physical Exam   GENERAL: alert, no distress and elderly  NECK: no adenopathy, no asymmetry, masses, or scars and thyroid normal to palpation  RESP: lungs clear to auscultation - no rales, rhonchi or wheezes  CV: regular rates and rhythm, normal S1 S2, no S3 or S4 and no murmur, click or rub  ABDOMEN: soft, nontender, no hepatosplenomegaly, no masses and bowel sounds normal  MS: right> left pedal edema, Homans' sign negative, no skin discoloration, warmth or tenderness noted, pedal " pulses 3+ bilaterally, knee surgical scar C/D/I  SKIN: no suspicious lesions or rashes  NEURO: normal strength and tone, mentation intact and speech normal  PSYCH: mentation appears normal, affect normal/bright

## 2021-06-28 NOTE — PATIENT INSTRUCTIONS
Please call [phone] to schedule U/S right leg.       Patient Education     Leg Swelling in Both Legs    Swelling of the feet, ankles, and legs is called edema. It is caused by excess fluid that has collected in the tissues. Extra fluid in the body settles in the lowest part because of gravity. This is why the legs and feet are most affected.  Some of the causes for edema include:    Disease of the heart such as congestive heart failure    Standing or sitting for long periods of time    Infection of the feet or legs    Blood pooling in the veins of your legs (venous insufficiency) when the veins have less elasticity    Dilated veins in your lower leg (varicose veins)    Stockings or other clothing that is tight on your legs. This will cause blood to pool in your legs because the clothing limits blood flow.    Some medicines. These include some hormones such as birth control pills, some blood pressure medicines such as calcium channel blockers, steroids, and some antidepressants such as MAO inhibitors and tricyclics.    Menstrual periods that cause you to retain fluids    Many types of kidney disease    Liver failure or cirrhosis    Pregnancy. Some swelling is normal, but a sudden increase in leg swelling or weight gain can be a sign of a dangerous complication of pregnancy called eclampsia.    Poor nutrition    Thyroid disease  Treatment will depend on what is causing the swelling in your legs. Your healthcare provider may prescribe water pills (diuretics) to get rid of the extra fluid.  Home care  Follow these guidelines when caring for yourself at home:    Don't wear clothing such as stockings that are tight on your legs.    Keep your legs up while lying or sitting.    If infection, injury, or recent surgery is causing the swelling, stay off your legs as much as possible until symptoms get better.    If your healthcare provider says that your leg swelling is caused by venous insufficiency or varicose veins,  don't sit or  one place for long periods of time. Take breaks and walk about every few hours. Brisk walking is a good exercise. It helps circulate the blood that has collected in your leg. Talk with your provider about using support stockings to stop daytime leg swelling.    If your provider says that heart disease is causing your leg swelling, follow a low-salt diet to stop extra fluid from staying in your body. You may also need medicine.  Follow-up care  Follow up with your healthcare provider, or as advised.  When to seek medical advice  Call your healthcare provider right away if any of these occur:    Swelling in both legs or ankles that gets worse    Swelling of the abdomen    Redness, warmth, or swelling in one leg    Fever of 100.4 F (38 C) or higher , or as directed by your healthcare provider    Yellow color to your skin or eyes    Rapid, unexplained weight gain    Having to sleep upright or use an increased number of pillow     Call 911  This is the fastest and safest way to get to the emergency department. The paramedics can also start treatment on the way to the hospital, if needed.  Call 911, or seekmedical attention right away if    You have new shortness of breath or chest pain    Worsening shortness of breath or chest pain?  [employer] last reviewed this educational content on 11/1/2019 2000-2021 [employer]. All rights reserved. This information is not intended as a substitute for professional medical care. Always follow your healthcare professional's instructions.

## 2021-06-28 NOTE — NURSING NOTE
"Chief Complaint   Patient presents with     Leg Swelling     BP (!) 154/70 (Cuff Size: Adult Regular)   Pulse 59   Temp 97.7  F (36.5  C) (Tympanic)   Resp 18   Ht 1.676 m (5' 6\")   Wt 82.6 kg (182 lb)   SpO2 98%   BMI 29.38 kg/m   Estimated body mass index is 29.38 kg/m  as calculated from the following:    Height as of this encounter: 1.676 m (5' 6\").    Weight as of this encounter: 82.6 kg (182 lb).  Patient presents to the clinic using No DME      Health Maintenance that is potentially due pending provider review:    Health Maintenance Due   Topic Date Due     ANNUAL REVIEW OF HM ORDERS  Never done     MEDICARE ANNUAL WELLNESS VISIT  11/09/2010     AORTIC ANEURYSM SCREENING (SYSTEM ASSIGNED)  Never done     DIABETIC FOOT EXAM  10/13/2016     ADVANCE CARE PLANNING  05/15/2017     MICROALBUMIN  03/06/2020     ASTHMA ACTION PLAN  08/26/2020     LIPID  07/11/2021                " No

## 2021-06-29 ENCOUNTER — HOSPITAL ENCOUNTER (OUTPATIENT)
Dept: ULTRASOUND IMAGING | Facility: CLINIC | Age: 76
Discharge: HOME | End: 2021-06-29
Attending: FAMILY MEDICINE | Admitting: FAMILY MEDICINE
Payer: MEDICARE

## 2021-06-29 DIAGNOSIS — R60.0 PEDAL EDEMA: ICD-10-CM

## 2021-06-29 PROCEDURE — 93971 EXTREMITY STUDY: CPT | Mod: RT

## 2021-06-30 RX ORDER — CLONIDINE HYDROCHLORIDE 0.1 MG/1
TABLET ORAL
Qty: 180 TABLET | Refills: 1 | Status: SHIPPED | OUTPATIENT
Start: 2021-06-30 | End: 2021-11-19

## 2021-06-30 NOTE — TELEPHONE ENCOUNTER
"Requested Prescriptions   Pending Prescriptions Disp Refills     cloNIDine (CATAPRES) 0.1 MG tablet [Pharmacy Med Name: CLONIDINE HYDROCHLORIDE 0.1 MG Tablet] 180 tablet 1     Sig: TAKE 1 TABLET TWICE DAILY       Central Acting Antiadrenergic Agents Failed - 6/28/2021 11:55 PM        Failed - Blood pressure under 140/90 in past 12 months     BP Readings from Last 3 Encounters:   06/28/21 (!) 154/70   06/02/21 138/60   05/10/21 118/60                 Failed - Normal serum creatinine on file within past 12 months     Recent Labs   Lab Test 05/10/21  0902   CR 1.48*       Ok to refill medication if creatinine is low          Passed - Patient is 6 years of age or older        Passed - Recent (12 mo) or future (30 days) visit within the authorizing provider's specialty     Patient has had an office visit with the authorizing provider or a provider within the authorizing providers department within the previous 12 mos or has a future within next 30 days. See \"Patient Info\" tab in inCarWoo!et, or \"Choose Columns\" in Meds & Orders section of the refill encounter.              Passed - Medication is active on med list       Antiadrenergic Antihypertensives Failed - 6/28/2021 11:55 PM        Failed - Blood pressure less than 140/90 in past 6 months     BP Readings from Last 3 Encounters:   06/28/21 (!) 154/70   06/02/21 138/60   05/10/21 118/60                 Failed - Normal serum creatinine on file in past 12 months     Recent Labs   Lab Test 05/10/21  0902   CR 1.48*       Ok to refill medication if creatinine is low          Passed - Medication is active on med list        Passed - Patient is age 18 or older        Passed - Recent (6 mo) or future (30 days) visit within the authorizing provider's specialty     Patient had office visit in the last 6 months or has a visit in the next 30 days with authorizing provider or within the authorizing provider's specialty.  See \"Patient Info\" tab in inbasket, or \"Choose Columns\" in " Meds & Orders section of the refill encounter.

## 2021-07-01 ENCOUNTER — OFFICE VISIT (OUTPATIENT)
Dept: DERMATOLOGY | Facility: CLINIC | Age: 76
End: 2021-07-01
Payer: MEDICARE

## 2021-07-01 VITALS — HEART RATE: 59 BPM | DIASTOLIC BLOOD PRESSURE: 72 MMHG | OXYGEN SATURATION: 99 % | SYSTOLIC BLOOD PRESSURE: 149 MMHG

## 2021-07-01 DIAGNOSIS — L30.1 DYSHIDROTIC ECZEMA: ICD-10-CM

## 2021-07-01 DIAGNOSIS — L30.0 NUMMULAR DERMATITIS: Primary | ICD-10-CM

## 2021-07-01 PROCEDURE — 99213 OFFICE O/P EST LOW 20 MIN: CPT | Performed by: PHYSICIAN ASSISTANT

## 2021-07-01 NOTE — LETTER
7/1/2021         RE: Sae Milligan  [address] [id number]        Dear Colleague,    Thank you for referring your patient, Sae Milligan, to the [facility]. Please see a copy of my visit note below.    HPI:   Chief complaints: Sae Milligan is a pleasant 75 year old male who presents for recheck of itchy rash on the back. Additionally he has had a rash on the hands which is peely and itchy. He has been using moisturizers but these have not been helping      PHYSICAL EXAM:    BP (!) 149/72   Pulse 59   SpO2 99%   Skin exam performed as follows: Type 2 skin. Mood appropriate  Alert and Oriented X 3. Well developed, well nourished in no distress.  General appearance: Normal  Head including face: Normal  Eyes: conjunctiva and lids: Normal  Mouth: Lips, teeth, gums: Normal  Neck: Normal  Chest-breast/axillae: Normal  Back: Normal  Spleen and liver: Normal  Cardiovascular: Exam of peripheral vascular system by observation for swelling, varicosities, edema: Normal  Genitalia: groin, buttocks: Normal  Extremities: digits/nails (clubbing): Normal  Eccrine and Apocrine glands: Normal  Right upper extremity: Normal  Left upper extremity: Normal  Right lower extremity: Normal  Left lower extremity: Normal  Skin: Scalp and body hair: See below    1. Annular xerotic plaques on the back  2. Tiny vessicles with peeling on the feet; small amount on the hands     ASSESSMENT/PLAN:     1. Nummular dermatitis on the back, dyshidrotic eczema on the feet - he has betamethasone cream at home, but he has not yet used this.   --Start betamethasone cream BID x 2-3 weeks then PRN  --Continue thick moisturizers daily  2. Sae to follow up with Primary Care provider regarding elevated blood pressure.        Follow-up: PRN  CC:   Scribed By: Letitia Garcia, MS, PA-C          Again, thank you for allowing me to participate in the care of your patient.        Sincerely,        Letitia Garcia,  SAMIRA

## 2021-07-01 NOTE — PROGRESS NOTES
HPI:   Chief complaints: Sae Milligan is a pleasant 75 year old male who presents for recheck of itchy rash on the back. Additionally he has had a rash on the hands which is peely and itchy. He has been using moisturizers but these have not been helping      PHYSICAL EXAM:    BP (!) 149/72   Pulse 59   SpO2 99%   Skin exam performed as follows: Type 2 skin. Mood appropriate  Alert and Oriented X 3. Well developed, well nourished in no distress.  General appearance: Normal  Head including face: Normal  Eyes: conjunctiva and lids: Normal  Mouth: Lips, teeth, gums: Normal  Neck: Normal  Chest-breast/axillae: Normal  Back: Normal  Spleen and liver: Normal  Cardiovascular: Exam of peripheral vascular system by observation for swelling, varicosities, edema: Normal  Genitalia: groin, buttocks: Normal  Extremities: digits/nails (clubbing): Normal  Eccrine and Apocrine glands: Normal  Right upper extremity: Normal  Left upper extremity: Normal  Right lower extremity: Normal  Left lower extremity: Normal  Skin: Scalp and body hair: See below    1. Annular xerotic plaques on the back  2. Tiny vessicles with peeling on the feet; small amount on the hands     ASSESSMENT/PLAN:     1. Nummular dermatitis on the back, dyshidrotic eczema on the feet - he has betamethasone cream at home, but he has not yet used this.   --Start betamethasone cream BID x 2-3 weeks then PRN  --Continue thick moisturizers daily  2. Sae to follow up with Primary Care provider regarding elevated blood pressure.        Follow-up: PRN  CC:   Scribed By: Letitia Garcia, MS, PAMEKA

## 2021-07-02 ENCOUNTER — TRANSFERRED RECORDS (OUTPATIENT)
Dept: HEALTH INFORMATION MANAGEMENT | Facility: CLINIC | Age: 76
End: 2021-07-02

## 2021-07-12 DIAGNOSIS — E78.00 PURE HYPERCHOLESTEROLEMIA: ICD-10-CM

## 2021-07-15 RX ORDER — ATORVASTATIN CALCIUM 20 MG/1
TABLET, FILM COATED ORAL
Qty: 90 TABLET | Refills: 0 | Status: SHIPPED | OUTPATIENT
Start: 2021-07-15 | End: 2021-09-22

## 2021-07-15 NOTE — TELEPHONE ENCOUNTER
Lab Results   Component Value Date    CHOL 123 07/11/2020     Lab Results   Component Value Date    HDL 70 07/11/2020     Lab Results   Component Value Date    LDL 44 07/11/2020     Lab Results   Component Value Date    TRIG 43 07/11/2020     Lab Results   Component Value Date    CHOLHDLRATIO 2.2 10/06/2015

## 2021-07-26 ENCOUNTER — APPOINTMENT (OUTPATIENT)
Dept: GENERAL RADIOLOGY | Facility: CLINIC | Age: 76
End: 2021-07-26
Attending: NURSE PRACTITIONER
Payer: MEDICARE

## 2021-07-26 ENCOUNTER — ALLIED HEALTH/NURSE VISIT (OUTPATIENT)
Dept: FAMILY MEDICINE | Facility: CLINIC | Age: 76
End: 2021-07-26
Payer: MEDICARE

## 2021-07-26 ENCOUNTER — HOSPITAL ENCOUNTER (EMERGENCY)
Facility: CLINIC | Age: 76
Discharge: HOME | End: 2021-07-26
Attending: NURSE PRACTITIONER | Admitting: NURSE PRACTITIONER
Payer: MEDICARE

## 2021-07-26 VITALS
HEIGHT: 66 IN | BODY MASS INDEX: 28.93 KG/M2 | DIASTOLIC BLOOD PRESSURE: 57 MMHG | WEIGHT: 180 LBS | SYSTOLIC BLOOD PRESSURE: 131 MMHG | HEART RATE: 49 BPM | OXYGEN SATURATION: 97 % | RESPIRATION RATE: 20 BRPM | TEMPERATURE: 97.5 F

## 2021-07-26 VITALS — DIASTOLIC BLOOD PRESSURE: 57 MMHG | SYSTOLIC BLOOD PRESSURE: 131 MMHG

## 2021-07-26 DIAGNOSIS — I10 ESSENTIAL HYPERTENSION: Primary | ICD-10-CM

## 2021-07-26 DIAGNOSIS — M10.9 GOUTY ARTHRITIS OF LEFT FOOT: ICD-10-CM

## 2021-07-26 PROCEDURE — 73630 X-RAY EXAM OF FOOT: CPT | Mod: LT

## 2021-07-26 PROCEDURE — 99284 EMERGENCY DEPT VISIT MOD MDM: CPT | Performed by: NURSE PRACTITIONER

## 2021-07-26 PROCEDURE — 99207 PR NO CHARGE LOS: CPT | Performed by: FAMILY MEDICINE

## 2021-07-26 PROCEDURE — 99283 EMERGENCY DEPT VISIT LOW MDM: CPT | Performed by: NURSE PRACTITIONER

## 2021-07-26 RX ORDER — PREDNISONE 20 MG/1
TABLET ORAL
Qty: 10 TABLET | Refills: 0 | Status: SHIPPED | OUTPATIENT
Start: 2021-07-26

## 2021-07-26 RX ORDER — PREDNISONE 20 MG/1
TABLET ORAL
Qty: 10 TABLET | Refills: 0 | Status: SHIPPED | OUTPATIENT
Start: 2021-07-26 | End: 2021-07-26

## 2021-07-26 RX ORDER — KETOCONAZOLE 20 MG/G
CREAM TOPICAL 2 TIMES DAILY
Qty: 60 G | Refills: 0 | Status: SHIPPED | OUTPATIENT
Start: 2021-07-26 | End: 2021-07-26

## 2021-07-26 RX ORDER — KETOCONAZOLE 20 MG/G
CREAM TOPICAL 2 TIMES DAILY
Qty: 60 G | Refills: 0 | Status: SHIPPED | OUTPATIENT
Start: 2021-07-26

## 2021-07-26 ASSESSMENT — MIFFLIN-ST. JEOR: SCORE: 1494.22

## 2021-07-26 NOTE — ED PROVIDER NOTES
History     Chief Complaint   Patient presents with     Foot Pain     onset last night     HPI  Sae Milligan is a 75 year old male with past medical history of diabetes, squamous cell carcinoma, chronic kidney disease, gastric ulcers, hyperlipidemia, degenerative joint disease, asthma, hypertension, seasonal allergies, diabetic neuropathy who presents to emergency department with acute onset of left foot pain without trauma.  Patient reports that he noted sudden onset of dorsal mid lateral foot pain last night following eating supper.  Patient denies any trauma, history of gout, history of similar pain, sores, insect bites, lesions.  Patient endorses chronic dry scaly, callused feet.  Patient took ibuprofen this morning to treat his pain and reports a sharp stabbing pain that he rates a 5 or 6 out of 10.    Patient denies fever, aches, chills, sweats, ear pain, eye pain, throat pain, chest pain, cough, wheezing, shortness of breath, abdominal pain, nausea, vomiting, diarrhea, hematochezia, hematuria, dysuria.  Patient reports feeling well otherwise    Allergies:  Allergies   Allergen Reactions     Penicillins Hives, Itching and Swelling     Of lips       Problem List:    Patient Active Problem List    Diagnosis Date Noted     Benign prostatic hyperplasia with nocturia 05/10/2021     Priority: Medium     Surgery 2021 helped sx         Essential hypertension 01/11/2021     Priority: Medium     Limited on more meds by 50's on pulse on low dose beta blocker and hyponatremia requiring previous hospitalization on thiazide.        Hyponatremia 01/11/2021     Priority: Medium     Runs 130 on avg.  Hospitalization in [place] for low sodium when on diuretic.         CKD (chronic kidney disease) stage 3, GFR 30-59 ml/min 11/13/2019     Priority: Medium     Multiple gastric ulcers 08/26/2019     Priority: Medium     Seen on EGD for some epigastric pain.  None with stigmata of bleeding . Started on PPI.  8/19       Anxiety  12/17/2013     Priority: Medium     Restless leg syndrome 12/02/2013     Priority: Medium     Family history of prostate cancer 05/13/2013     Priority: Medium     3 brothers  had prostate cancer       Squamous cell carcinoma 10/03/2012     Priority: Medium     Left helix. July 20, 2012       Allergic rhinitis 10/12/2011     Priority: Medium     DJD (degenerative joint disease) 02/09/2011     Priority: Medium     Type 2 diabetes mellitus with diabetic nephropathy (H) 03/14/2005     Priority: Medium              Hyperlipidemia LDL goal <100 03/14/2005     Priority: Medium     Had myalgias from simvastatin, were better immediately after stopping, tolerated Lipitor well       [facility] 06/23/2011     Priority: Low     EMERGENCY CARE PLAN  Presenting Problem Signs and Symptoms Treatment Plan    Questions or conerns during clinic hours    I will call the clinic directly     Questions or conerns outside clinic hours    I will call the 24 hour nurse line at [phone]    Patient needs to schedule an appointment    I will call the 24 hour scheduling team at [phone] or clinic directly    Same day treatment     I will call the clinic first, nurse line if after hours, urgent care and express care if needed                            DX V65.8 REPLACED WITH [id number] [facility] (04/08/2013)          Past Medical History:    Past Medical History:   Diagnosis Date     Allergic rhinitis      Asthma      BPH associated with nocturia      Chronic kidney disease      CKD (chronic kidney disease) stage 3, GFR 30-59 ml/min      Diabetes mellitus (H)      DJD (degenerative joint disease)      Hyperlipidemia      Hypertension      Multiple gastric ulcers      RLS (restless legs syndrome)      Squamous cell carcinoma      Squamous cell carcinoma in situ      Type 2 diabetes mellitus with diabetic neuropathy (H)        Past Surgical History:    Past Surgical History:   Procedure Laterality Date     ARTHROPLASTY KNEE   2/10/2011    ARTHROPLASTY KNEE performed by DARLING FARRELL at [facility]     ARTHROPLASTY KNEE       COLONOSCOPY N/A 3/2/2018    Procedure: COLONOSCOPY;  colonoscopy;  Surgeon: Aris Whittaker MD;  Location: [facility]     COLONOSCOPY       CYSTOSCOPY N/A 10/12/2020    Procedure: CYSTOSCOPY;  Surgeon: Aris Hawkins MD;  Location: [facility];  Service: Urology     ESOPHAGOSCOPY, GASTROSCOPY, DUODENOSCOPY (EGD), COMBINED N/A 2019    Procedure: ESOPHAGOGASTRODUODENOSCOPY, WITH BIOPSY;  Surgeon: John Christianson DO;  Location: [facility]     ESOPHAGOSCOPY, GASTROSCOPY, DUODENOSCOPY (EGD), COMBINED        TRANSURETHRAL ELEC-SURG PROSTATECTOM N/A 2021    Procedure: CYSTOSCOPY, BIPOLAR TRANSURETHRAL RESECTION OF PROSTATE;  Surgeon: Aris Hawkins MD;  Location: [facility];  Service: Urology     JOINT REPLACEMENT      Bilateral Knees     MOHS MICROGRAPHIC PROCEDURE  2012    Sq. cell left helix     MOHS MICROGRAPHIC PROCEDURE       ORTHOPEDIC SURGERY       ORTHOPEDIC SURGERY       SURGICAL HISTORY OF -   2003    Cervical spine fx       Family History:    Family History   Problem Relation Age of Onset     Hypertension Mother      Cerebrovascular Disease Mother      Hypertension Father      Cerebrovascular Disease Father      Diabetes Brother      Prostate Cancer Brother      Prostate Cancer Brother        Social History:  Marital Status:   [2]  Social History     Tobacco Use     Smoking status: Former Smoker     Quit date: 3/14/1990     Years since quittin.3     Smokeless tobacco: Never Used   Substance Use Topics     Alcohol use: Yes     Comment: occasional     Drug use: No        Medications:    ketoconazole (NIZORAL) 2 % external cream  predniSONE (DELTASONE) 20 MG tablet  ACCU-CHEK ANTONIA PLUS test strip  ACCU-CHEK ANTONIA test strip  albuterol (PROAIR HFA/PROVENTIL HFA/VENTOLIN HFA) 108 (90 Base) MCG/ACT inhaler  albuterol (PROVENTIL) (2.5 MG/3ML) 0.083% neb  "solution  amLODIPine (NORVASC) 5 MG tablet  aspirin (ASA) 81 MG tablet  atorvastatin (LIPITOR) 20 MG tablet  augmented betamethasone dipropionate (DIPROLENE-AF) 0.05 % external cream  B Complex Vitamins (VITAMIN B COMPLEX PO)  CALCIUM CARBONATE-VITAMIN D 500-200 MG-UNIT OR TABS  cloNIDine (CATAPRES) 0.1 MG tablet  fluticasone (FLONASE) 50 MCG/ACT nasal spray  glipiZIDE (GLUCOTROL XL) 5 MG 24 hr tablet  lisinopril (ZESTRIL) 40 MG tablet  magnesium 500 MG TABS  metFORMIN (GLUCOPHAGE) 500 MG tablet  metoprolol tartrate (LOPRESSOR) 25 MG tablet  omeprazole (PRILOSEC) 20 MG DR capsule  order for DME  Potassium Gluconate 595 MG CAPS  Selenium 200 MCG TABS  tamsulosin (FLOMAX) 0.4 MG capsule      Review of Systems  As mentioned above in the history present illness. All other systems were reviewed and are negative.    Physical Exam   BP: 131/57  Pulse: (!) 49  Temp: 97.5  F (36.4  C)  Resp: 20  Height: 167.6 cm (5' 6\")  Weight: 81.6 kg (180 lb)  SpO2: 97 %      Physical Exam  Vitals and nursing note reviewed.   Constitutional:       General: He is not in acute distress.     Appearance: Normal appearance. He is well-developed. He is not ill-appearing, toxic-appearing or diaphoretic.   HENT:      Head: Normocephalic and atraumatic.      Right Ear: External ear normal.      Left Ear: External ear normal.      Nose: Nose normal.   Eyes:      General:         Right eye: No discharge.         Left eye: No discharge.      Conjunctiva/sclera: Conjunctivae normal.   Cardiovascular:      Rate and Rhythm: Normal rate and regular rhythm.      Heart sounds: Normal heart sounds. No murmur heard.   No friction rub. No gallop.    Pulmonary:      Effort: Pulmonary effort is normal.      Breath sounds: Normal breath sounds. No stridor. No wheezing.   Musculoskeletal:      Left foot: Normal capillary refill. Swelling (trace), tenderness and bony tenderness present. No deformity, bunion, laceration or crepitus. Normal pulse.        Feet:       " Comments: Bilateral interdigital erythema and scaling noted of sole of foot and thickening hyperkeratosis noted with weeping/oozing,   Skin:     General: Skin is warm.      Findings: No rash.   Neurological:      Mental Status: He is alert and oriented to person, place, and time.       ED Course        Procedures    Results for orders placed or performed during the hospital encounter of 07/26/21 (from the past 24 hour(s))   Foot XR, G/E 3 views, left    Narrative    XR FOOT LEFT G/E 3 VIEWS 7/26/2021 10:50 AM     HISTORY: onset left sided foot pain last night after supper no trauma      Impression    IMPRESSION: No evidence of fracture or osseous destruction. Small  vessel atherosclerotic calcification in the forefoot. Extensive  efforts chronic calcification is also noted at the ankle. Plantar  calcaneal spur.    TAYLER SANTILLAN MD         SYSTEM ID:  [id number]       Medications - No data to display    Assessments & Plan (with Medical Decision Making)  75-year-old male presenting to the emergency department with acute onset of left foot pain in the setting of ingestion of fish last evening without previous history of or skin lesions or erythema or trauma.  On exam patient is vitally stable and has mid dorsal lateral bony and foot tenderness mid to distal metatarsals 3 4 and 5 with trace swelling without erythema or warmth.  X-ray ordered to evaluate for any occult fractures and unlikely dislocation or contusion.  Suspicion for gouty arthritis versus arthritis is likely.  X-ray obtained and reveals calcifications atherosclerosis and no fracture or osseous destruction.  Will treat as a gouty arthritis with a 5-day course of prednisone and also noted is the tinea pedis and will prescribe ketoconazole cream topically twice daily and recommend follow-up if no improvement in either of these with primary care.  Patient verbalized understanding.  Educated on bleach baths.  Patient discharged in stable condition.     I have  reviewed the nursing notes.    I have reviewed the findings, diagnosis, plan and need for follow up with the patient.    Current Discharge Medication List      START taking these medications    Details   ketoconazole (NIZORAL) 2 % external cream Apply topically 2 times daily  Qty: 60 g, Refills: 0      predniSONE (DELTASONE) 20 MG tablet Take two tablets (= 40mg) each day for 5 (five) days  Qty: 10 tablet, Refills: 0             Final diagnoses:   Gouty arthritis of left foot       7/26/2021   [facility] EMERGENCY DEPT     Leeanna Estevez, APRN CNP  07/26/21 [id number]

## 2021-07-26 NOTE — DISCHARGE INSTRUCTIONS
Start prednisone today and take 2 tablets daily for 5 days for the pain in the left foot.  Follow-up in 4 to 5 days if no improvement in the foot pain or if there is sudden worsening return sooner.  Start applying clotrimazole cream twice daily to the foot and reevaluate in 10 to 14 days.  If improving continue this until rash is completely gone.  Also consider bleach baths weekly and Epsom soaks twice daily.  Follow-up with primary care if no improvement in the foot rash within the next 10 to 14 days.

## 2021-09-18 ENCOUNTER — HEALTH MAINTENANCE LETTER (OUTPATIENT)
Age: 76
End: 2021-09-18

## 2021-09-20 DIAGNOSIS — E78.00 PURE HYPERCHOLESTEROLEMIA: ICD-10-CM

## 2021-09-22 RX ORDER — ATORVASTATIN CALCIUM 20 MG/1
TABLET, FILM COATED ORAL
Qty: 90 TABLET | Refills: 0 | Status: SHIPPED | OUTPATIENT
Start: 2021-09-22 | End: 2021-11-30

## 2021-09-22 NOTE — TELEPHONE ENCOUNTER
Routing refill request to provider for review/approval because:  Labs not current:    Last Lipid profile 7/11/20    Last seen 6/29/21

## 2021-09-23 ENCOUNTER — OFFICE VISIT (OUTPATIENT)
Dept: DERMATOLOGY | Facility: CLINIC | Age: 76
End: 2021-09-23
Payer: MEDICARE

## 2021-09-23 ENCOUNTER — TELEPHONE (OUTPATIENT)
Dept: DERMATOLOGY | Facility: CLINIC | Age: 76
End: 2021-09-23

## 2021-09-23 VITALS — OXYGEN SATURATION: 98 % | HEART RATE: 54 BPM | SYSTOLIC BLOOD PRESSURE: 146 MMHG | DIASTOLIC BLOOD PRESSURE: 69 MMHG

## 2021-09-23 DIAGNOSIS — L30.1 DYSHIDROTIC ECZEMA: ICD-10-CM

## 2021-09-23 DIAGNOSIS — Z51.81 THERAPEUTIC DRUG MONITORING: Primary | ICD-10-CM

## 2021-09-23 DIAGNOSIS — Z11.59 ENCOUNTER FOR SCREENING FOR OTHER VIRAL DISEASES: ICD-10-CM

## 2021-09-23 LAB
ALBUMIN SERPL-MCNC: 3.9 G/DL (ref 3.4–5)
ALP SERPL-CCNC: 109 U/L (ref 40–150)
ALT SERPL W P-5'-P-CCNC: 34 U/L (ref 0–70)
ANION GAP SERPL CALCULATED.3IONS-SCNC: 4 MMOL/L (ref 3–14)
AST SERPL W P-5'-P-CCNC: 25 U/L (ref 0–45)
BILIRUB SERPL-MCNC: 0.6 MG/DL (ref 0.2–1.3)
BUN SERPL-MCNC: 29 MG/DL (ref 7–30)
CALCIUM SERPL-MCNC: 8.8 MG/DL (ref 8.5–10.1)
CHLORIDE BLD-SCNC: 99 MMOL/L (ref 94–109)
CO2 SERPL-SCNC: 27 MMOL/L (ref 20–32)
CREAT SERPL-MCNC: 1.49 MG/DL (ref 0.66–1.25)
ERYTHROCYTE [DISTWIDTH] IN BLOOD BY AUTOMATED COUNT: 13.4 % (ref 10–15)
GFR SERPL CREATININE-BSD FRML MDRD: 45 ML/MIN/1.73M2
GLUCOSE BLD-MCNC: 119 MG/DL (ref 70–99)
HBV SURFACE AB SERPL IA-ACNC: 0 M[IU]/ML
HCT VFR BLD AUTO: 31.3 % (ref 40–53)
HCV AB SERPL QL IA: NONREACTIVE
HGB BLD-MCNC: 10.5 G/DL (ref 13.3–17.7)
MCH RBC QN AUTO: 31 PG (ref 26.5–33)
MCHC RBC AUTO-ENTMCNC: 33.5 G/DL (ref 31.5–36.5)
MCV RBC AUTO: 92 FL (ref 78–100)
PLATELET # BLD AUTO: 162 10E3/UL (ref 150–450)
POTASSIUM BLD-SCNC: 5.6 MMOL/L (ref 3.4–5.3)
PROT SERPL-MCNC: 8.3 G/DL (ref 6.8–8.8)
RBC # BLD AUTO: 3.39 10E6/UL (ref 4.4–5.9)
SODIUM SERPL-SCNC: 130 MMOL/L (ref 133–144)
WBC # BLD AUTO: 6.5 10E3/UL (ref 4–11)

## 2021-09-23 PROCEDURE — 36415 COLL VENOUS BLD VENIPUNCTURE: CPT | Performed by: PHYSICIAN ASSISTANT

## 2021-09-23 PROCEDURE — 80053 COMPREHEN METABOLIC PANEL: CPT | Performed by: PHYSICIAN ASSISTANT

## 2021-09-23 PROCEDURE — 86803 HEPATITIS C AB TEST: CPT | Performed by: PHYSICIAN ASSISTANT

## 2021-09-23 PROCEDURE — 99214 OFFICE O/P EST MOD 30 MIN: CPT | Performed by: PHYSICIAN ASSISTANT

## 2021-09-23 PROCEDURE — 85027 COMPLETE CBC AUTOMATED: CPT | Performed by: PHYSICIAN ASSISTANT

## 2021-09-23 PROCEDURE — 86706 HEP B SURFACE ANTIBODY: CPT | Performed by: PHYSICIAN ASSISTANT

## 2021-09-23 RX ORDER — FOLIC ACID 1 MG/1
TABLET ORAL
Qty: 90 TABLET | Refills: 3 | Status: SHIPPED | OUTPATIENT
Start: 2021-09-23

## 2021-09-23 RX ORDER — METHOTREXATE 2.5 MG/1
TABLET ORAL
Qty: 28 TABLET | Refills: 0 | Status: SHIPPED | OUTPATIENT
Start: 2021-09-23

## 2021-09-23 NOTE — PROGRESS NOTES
HPI:   Chief complaints: Sae Milligan is a pleasant 75 year old male who presents for recheck of nummular dermatitis and dyshidrotic eczema. Topical betamethasone has helped but he is still flaring on the hands and feet. They are cracked and painful. He was also treated with an antifungal pill after LOV which did not help.       PHYSICAL EXAM:    BP (!) 146/69   Pulse 54   SpO2 98%   Skin exam performed as follows: Type 2 skin. Mood appropriate  Alert and Oriented X 3. Well developed, well nourished in no distress.  General appearance: Normal  Head including face: Normal  Eyes: conjunctiva and lids: Normal  Mouth: Lips, teeth, gums: Normal  Neck: Normal  Chest-breast/axillae: Normal  Back: Normal  Spleen and liver: Normal  Cardiovascular: Exam of peripheral vascular system by observation for swelling, varicosities, edema: Normal  Genitalia: groin, buttocks: Normal  Extremities: digits/nails (clubbing): Normal  Eccrine and Apocrine glands: Normal  Right upper extremity: Normal  Left upper extremity: Normal  Right lower extremity: Normal  Left lower extremity: Normal  Skin: Scalp and body hair: See below    1. Xerosis and dermatitis and peeling with fissuring on the hands and feet     ASSESSMENT/PLAN:     1. Nummular dermatitis on the back, dyshidrotic eczema on the feet - discussed Eucrisa vs NBUVB vs methotrexate. He prefers to start methotrexate.      --3 tablets of methotrexate all at once week #1; increase to 5 tablets starting week #2  --Folic acid on all other days  --CBC, CMP, hep panel checked today  --Recheck CBC and CMP in 1 week  --Advised on potential side effects of increased infection (decreased immunity), liver dysfunction/damage, oncogenesis, decreased blood counts. Advised to avoid EtOH and acetaminophen.   --Recheck in 1 month        2. Sae to follow up with Primary Care provider regarding elevated blood pressure.        Follow-up: 1 month  CC:   Scribed By: Letitia Garcia MS, PAMEKA

## 2021-09-23 NOTE — LETTER
9/23/2021         RE: Sae Milligan  [address] [address]        Dear Colleague,    Thank you for referring your patient, Sae Milligan, to the [facility]. Please see a copy of my visit note below.    HPI:   Chief complaints: Sae Milligan is a pleasant 75 year old male who presents for recheck of nummular dermatitis and dyshidrotic eczema. Topical betamethasone has helped but he is still flaring on the hands and feet. They are cracked and painful. He was also treated with an antifungal pill after LOV which did not help.       PHYSICAL EXAM:    BP (!) 146/69   Pulse 54   SpO2 98%   Skin exam performed as follows: Type 2 skin. Mood appropriate  Alert and Oriented X 3. Well developed, well nourished in no distress.  General appearance: Normal  Head including face: Normal  Eyes: conjunctiva and lids: Normal  Mouth: Lips, teeth, gums: Normal  Neck: Normal  Chest-breast/axillae: Normal  Back: Normal  Spleen and liver: Normal  Cardiovascular: Exam of peripheral vascular system by observation for swelling, varicosities, edema: Normal  Genitalia: groin, buttocks: Normal  Extremities: digits/nails (clubbing): Normal  Eccrine and Apocrine glands: Normal  Right upper extremity: Normal  Left upper extremity: Normal  Right lower extremity: Normal  Left lower extremity: Normal  Skin: Scalp and body hair: See below    1. Xerosis and dermatitis and peeling with fissuring on the hands and feet     ASSESSMENT/PLAN:     1. Nummular dermatitis on the back, dyshidrotic eczema on the feet - discussed Eucrisa vs NBUVB vs methotrexate. He prefers to start methotrexate.      --3 tablets of methotrexate all at once week #1; increase to 5 tablets starting week #2  --Folic acid on all other days  --CBC, CMP, hep panel checked today  --Recheck CBC and CMP in 1 week  --Advised on potential side effects of increased infection (decreased immunity), liver dysfunction/damage, oncogenesis, decreased  blood counts. Advised to avoid EtOH and acetaminophen.   --Recheck in 1 month        2. Sae to follow up with Primary Care provider regarding elevated blood pressure.        Follow-up: 1 month  CC:   Scribed By: Letitia Garcia MS, PAMynorC          Again, thank you for allowing me to participate in the care of your patient.        Sincerely,        Letitia Garcia PA-C

## 2021-09-23 NOTE — PATIENT INSTRUCTIONS
Methotrexate is a medication used in low doses to treat inflammatory skin conditions such as psoriasis and eczema/dermatitis. It is also prescribed for rheumatoid arthritis, psoriatic arthritis, and increasingly, other inflammatory and autoimmune disorders (off-label). In much higher doses, it is used as a chemotherapy agent for leukemia and some other forms of cancer.    For responding skin diseases, methotrexate usually shows some benefit within 6 to 8 weeks. Maximum effects are generally achieved within 5 to 6 months, depending on dose escalation.    Side effects of methotrexate  Side effects can occur at any time during treatment with methotrexate, but are most common in the first few weeks. Folic acid supplements, is thought to reduce some of the side effects of methotrexate.     If the side effects described below or other problems trouble you, or should you develop any signs of infection or unusual bleeding, notify your doctor promptly and before your next dose of methotrexate is due.      The most common side effects of methotrexate are loss of appetite, nausea and diarrhea, and affect about one in 12 patients. These side effects are usually temporary, but changes in dose and/or supplemental folic acid tablets may be helpful.    An overdose of methotrexate or deficiency of the vitamin folic acid may result in anemia , reduced white cell count, risking serious infections, and low platelet count, resulting in bruising and bleeding.    Methotrexate is stored by the liver. Transaminase liver enzyme levels may rise for a few days after treatment but they quickly return to normal.     Long term therapy may be associated with scarring (fibrosis or cirrhosis) of the liver. This is more commonly due to other reasons such as fatty liver, diabetes, hyperlipidemia, and obesity (ie metabolic syndrome), but can also develop from viral hepatitis and alcohol.    Methotrexate can rarely cause a lung reaction similar to  pneumonia called acute pneumonitis or interstitial pneumonia.      Today  -  Some baseline laboratory tests will be checked  - A prescription for folic acid will be sent to pharmacy for you to start.  (This is a vitamin that can help reduce the side effects of methotrexate)  -  A test dose of the medication to the pharmacy (3 tablets - you will take this amount the first week)  -  Take all of the pills in the methotrexate prescription on the same day  -  Recheck your labs at any [facility] 7 days from the date you take the test dose  -  Once your blood work is complete, our office we will call and let you know if you can continue methotrexate. Then you will start taking 5 tablets all at once one time per week    Follow up in 1 month

## 2021-09-23 NOTE — TELEPHONE ENCOUNTER
[facility] Call Center    Phone Message    May a detailed message be left on voicemail: yes     Reason for Call: Other: Pt called and stated he will be out of town starting 11/5/21. Pt is asking for a sooner appointment before going out of town. Please call Pt at [phone] to discuss scheduling options. Thank you.     Action Taken: Message routed to:  Other: [facility]    Travel Screening: Not Applicable

## 2021-09-29 ENCOUNTER — LAB (OUTPATIENT)
Dept: LAB | Facility: CLINIC | Age: 76
End: 2021-09-29
Payer: MEDICARE

## 2021-09-29 ENCOUNTER — IMMUNIZATION (OUTPATIENT)
Dept: FAMILY MEDICINE | Facility: CLINIC | Age: 76
End: 2021-09-29
Payer: MEDICARE

## 2021-09-29 DIAGNOSIS — Z51.81 THERAPEUTIC DRUG MONITORING: ICD-10-CM

## 2021-09-29 DIAGNOSIS — Z23 NEED FOR PROPHYLACTIC VACCINATION AND INOCULATION AGAINST INFLUENZA: Primary | ICD-10-CM

## 2021-09-29 LAB
ALBUMIN SERPL-MCNC: 3.6 G/DL (ref 3.4–5)
ALP SERPL-CCNC: 115 U/L (ref 40–150)
ALT SERPL W P-5'-P-CCNC: 38 U/L (ref 0–70)
ANION GAP SERPL CALCULATED.3IONS-SCNC: 7 MMOL/L (ref 3–14)
AST SERPL W P-5'-P-CCNC: 22 U/L (ref 0–45)
BILIRUB SERPL-MCNC: 0.4 MG/DL (ref 0.2–1.3)
BUN SERPL-MCNC: 26 MG/DL (ref 7–30)
CALCIUM SERPL-MCNC: 8.7 MG/DL (ref 8.5–10.1)
CHLORIDE BLD-SCNC: 96 MMOL/L (ref 94–109)
CO2 SERPL-SCNC: 25 MMOL/L (ref 20–32)
CREAT SERPL-MCNC: 1.54 MG/DL (ref 0.66–1.25)
ERYTHROCYTE [DISTWIDTH] IN BLOOD BY AUTOMATED COUNT: 13.7 % (ref 10–15)
GFR SERPL CREATININE-BSD FRML MDRD: 43 ML/MIN/1.73M2
GLUCOSE BLD-MCNC: 137 MG/DL (ref 70–99)
HCT VFR BLD AUTO: 29.3 % (ref 40–53)
HGB BLD-MCNC: 10.2 G/DL (ref 13.3–17.7)
MCH RBC QN AUTO: 31.3 PG (ref 26.5–33)
MCHC RBC AUTO-ENTMCNC: 34.8 G/DL (ref 31.5–36.5)
MCV RBC AUTO: 90 FL (ref 78–100)
PLATELET # BLD AUTO: 197 10E3/UL (ref 150–450)
POTASSIUM BLD-SCNC: 4.9 MMOL/L (ref 3.4–5.3)
PROT SERPL-MCNC: 8.2 G/DL (ref 6.8–8.8)
RBC # BLD AUTO: 3.26 10E6/UL (ref 4.4–5.9)
SODIUM SERPL-SCNC: 128 MMOL/L (ref 133–144)
WBC # BLD AUTO: 7.3 10E3/UL (ref 4–11)

## 2021-09-29 PROCEDURE — 90662 IIV NO PRSV INCREASED AG IM: CPT

## 2021-09-29 PROCEDURE — 36415 COLL VENOUS BLD VENIPUNCTURE: CPT

## 2021-09-29 PROCEDURE — 99207 PR NO CHARGE NURSE ONLY: CPT

## 2021-09-29 PROCEDURE — G0008 ADMIN INFLUENZA VIRUS VAC: HCPCS

## 2021-09-29 PROCEDURE — 80053 COMPREHEN METABOLIC PANEL: CPT

## 2021-09-29 PROCEDURE — 85027 COMPLETE CBC AUTOMATED: CPT

## 2021-10-21 ENCOUNTER — OFFICE VISIT (OUTPATIENT)
Dept: DERMATOLOGY | Facility: CLINIC | Age: 76
End: 2021-10-21
Payer: MEDICARE

## 2021-10-21 VITALS — OXYGEN SATURATION: 100 % | HEART RATE: 50 BPM | DIASTOLIC BLOOD PRESSURE: 67 MMHG | SYSTOLIC BLOOD PRESSURE: 151 MMHG

## 2021-10-21 DIAGNOSIS — Z51.81 THERAPEUTIC DRUG MONITORING: ICD-10-CM

## 2021-10-21 DIAGNOSIS — L30.1 DYSHIDROSIS: Primary | ICD-10-CM

## 2021-10-21 DIAGNOSIS — L30.0 NUMMULAR ECZEMA: ICD-10-CM

## 2021-10-21 LAB
ALBUMIN SERPL-MCNC: 3.5 G/DL (ref 3.4–5)
ALP SERPL-CCNC: 122 U/L (ref 40–150)
ALT SERPL W P-5'-P-CCNC: 35 U/L (ref 0–70)
ANION GAP SERPL CALCULATED.3IONS-SCNC: 6 MMOL/L (ref 3–14)
AST SERPL W P-5'-P-CCNC: 22 U/L (ref 0–45)
BILIRUB SERPL-MCNC: 0.5 MG/DL (ref 0.2–1.3)
BUN SERPL-MCNC: 28 MG/DL (ref 7–30)
CALCIUM SERPL-MCNC: 9.2 MG/DL (ref 8.5–10.1)
CHLORIDE BLD-SCNC: 100 MMOL/L (ref 94–109)
CO2 SERPL-SCNC: 23 MMOL/L (ref 20–32)
CREAT SERPL-MCNC: 1.57 MG/DL (ref 0.66–1.25)
ERYTHROCYTE [DISTWIDTH] IN BLOOD BY AUTOMATED COUNT: 14.3 % (ref 10–15)
GFR SERPL CREATININE-BSD FRML MDRD: 42 ML/MIN/1.73M2
GLUCOSE BLD-MCNC: 139 MG/DL (ref 70–99)
HCT VFR BLD AUTO: 27.5 % (ref 40–53)
HGB BLD-MCNC: 9.4 G/DL (ref 13.3–17.7)
MCH RBC QN AUTO: 31.9 PG (ref 26.5–33)
MCHC RBC AUTO-ENTMCNC: 34.2 G/DL (ref 31.5–36.5)
MCV RBC AUTO: 93 FL (ref 78–100)
PLATELET # BLD AUTO: 146 10E3/UL (ref 150–450)
POTASSIUM BLD-SCNC: 5 MMOL/L (ref 3.4–5.3)
PROT SERPL-MCNC: 7.7 G/DL (ref 6.8–8.8)
RBC # BLD AUTO: 2.95 10E6/UL (ref 4.4–5.9)
SODIUM SERPL-SCNC: 129 MMOL/L (ref 133–144)
WBC # BLD AUTO: 5 10E3/UL (ref 4–11)

## 2021-10-21 PROCEDURE — 85027 COMPLETE CBC AUTOMATED: CPT | Performed by: PHYSICIAN ASSISTANT

## 2021-10-21 PROCEDURE — 80053 COMPREHEN METABOLIC PANEL: CPT | Performed by: PHYSICIAN ASSISTANT

## 2021-10-21 PROCEDURE — 36415 COLL VENOUS BLD VENIPUNCTURE: CPT | Performed by: PHYSICIAN ASSISTANT

## 2021-10-21 PROCEDURE — 99214 OFFICE O/P EST MOD 30 MIN: CPT | Performed by: PHYSICIAN ASSISTANT

## 2021-10-21 RX ORDER — METHOTREXATE 2.5 MG/1
TABLET ORAL
Qty: 60 TABLET | Refills: 1 | Status: SHIPPED | OUTPATIENT
Start: 2021-10-21

## 2021-10-21 NOTE — NURSING NOTE
"Initial BP (!) 151/67 (BP Location: Left arm)   Pulse 50   SpO2 100%  Estimated body mass index is 29.05 kg/m  as calculated from the following:    Height as of 7/26/21: 1.676 m (5' 6\").    Weight as of 7/26/21: 81.6 kg (180 lb). .      "

## 2021-10-21 NOTE — LETTER
10/21/2021         RE: Sae Milligan  [address] [address]        Dear Colleague,    Thank you for referring your patient, Sae Milligan, to the [facility]. Please see a copy of my visit note below.    HPI:   Chief complaints: Sae Milligan is a pleasant 75 year old male who presents for recheck of nummular dermatitis and dyshidrotic eczema. He is currently taking 5 tablets methotrexate weekly. He is feeling good on the prescription and is starting to improve on his hands and feet. Back is resolved. No adverse effects.       PHYSICAL EXAM:    BP (!) 151/67 (BP Location: Left arm)   Pulse 50   SpO2 100%   Skin exam performed as follows: Type 2 skin. Mood appropriate  Alert and Oriented X 3. Well developed, well nourished in no distress.  General appearance: Normal  Head including face: Normal  Eyes: conjunctiva and lids: Normal  Mouth: Lips, teeth, gums: Normal  Neck: Normal  Chest-breast/axillae: Normal  Back: Normal  Spleen and liver: Normal  Cardiovascular: Exam of peripheral vascular system by observation for swelling, varicosities, edema: Normal  Genitalia: groin, buttocks: Normal  Extremities: digits/nails (clubbing): Normal  Eccrine and Apocrine glands: Normal  Right upper extremity: Normal  Left upper extremity: Normal  Right lower extremity: Normal  Left lower extremity: Normal  Skin: Scalp and body hair: See below    1. Faint xerosis on bilateral palms; resolving dermatitis on the feet     ASSESSMENT/PLAN:     1. Nummular dermatitis on the back, dyshidrotic eczema on the feet - doing great on methotrexate. He is pleased with results thus far.      --Continue 5 tablets methotrexate weekly  --Folic acid on all other days  --CBC, CMP checked today  --Advised on potential side effects of increased infection (decreased immunity), liver dysfunction/damage, oncogenesis, decreased blood counts. Advised to avoid EtOH and acetaminophen.         2. Sae to follow up  with Primary Care provider regarding elevated blood pressure.        Follow-up: 2 months  CC:   Scribed By: Letitia Garcia, MS, PAMEKA          Again, thank you for allowing me to participate in the care of your patient.        Sincerely,        Letitia Garcia PA-C

## 2021-10-21 NOTE — PROGRESS NOTES
HPI:   Chief complaints: Sae Milligan is a pleasant 75 year old male who presents for recheck of nummular dermatitis and dyshidrotic eczema. He is currently taking 5 tablets methotrexate weekly. He is feeling good on the prescription and is starting to improve on his hands and feet. Back is resolved. No adverse effects.       PHYSICAL EXAM:    BP (!) 151/67 (BP Location: Left arm)   Pulse 50   SpO2 100%   Skin exam performed as follows: Type 2 skin. Mood appropriate  Alert and Oriented X 3. Well developed, well nourished in no distress.  General appearance: Normal  Head including face: Normal  Eyes: conjunctiva and lids: Normal  Mouth: Lips, teeth, gums: Normal  Neck: Normal  Chest-breast/axillae: Normal  Back: Normal  Spleen and liver: Normal  Cardiovascular: Exam of peripheral vascular system by observation for swelling, varicosities, edema: Normal  Genitalia: groin, buttocks: Normal  Extremities: digits/nails (clubbing): Normal  Eccrine and Apocrine glands: Normal  Right upper extremity: Normal  Left upper extremity: Normal  Right lower extremity: Normal  Left lower extremity: Normal  Skin: Scalp and body hair: See below    1. Faint xerosis on bilateral palms; resolving dermatitis on the feet     ASSESSMENT/PLAN:     1. Nummular dermatitis on the back, dyshidrotic eczema on the feet - doing great on methotrexate. He is pleased with results thus far.      --Continue 5 tablets methotrexate weekly  --Folic acid on all other days  --CBC, CMP checked today  --Advised on potential side effects of increased infection (decreased immunity), liver dysfunction/damage, oncogenesis, decreased blood counts. Advised to avoid EtOH and acetaminophen.         2. Sae to follow up with Primary Care provider regarding elevated blood pressure.        Follow-up: 2 months  CC:   Scribed By: Letitia Garcia, MS, PA-C

## 2021-11-03 ENCOUNTER — LAB (OUTPATIENT)
Dept: LAB | Facility: CLINIC | Age: 76
End: 2021-11-03
Payer: MEDICARE

## 2021-11-03 DIAGNOSIS — Z51.81 THERAPEUTIC DRUG MONITORING: ICD-10-CM

## 2021-11-03 LAB
ALBUMIN SERPL-MCNC: 3.6 G/DL (ref 3.4–5)
ALP SERPL-CCNC: 132 U/L (ref 40–150)
ALT SERPL W P-5'-P-CCNC: 40 U/L (ref 0–70)
ANION GAP SERPL CALCULATED.3IONS-SCNC: 7 MMOL/L (ref 3–14)
AST SERPL W P-5'-P-CCNC: 22 U/L (ref 0–45)
BILIRUB SERPL-MCNC: 0.6 MG/DL (ref 0.2–1.3)
BUN SERPL-MCNC: 28 MG/DL (ref 7–30)
CALCIUM SERPL-MCNC: 9.2 MG/DL (ref 8.5–10.1)
CHLORIDE BLD-SCNC: 96 MMOL/L (ref 94–109)
CO2 SERPL-SCNC: 27 MMOL/L (ref 20–32)
CREAT SERPL-MCNC: 1.75 MG/DL (ref 0.66–1.25)
ERYTHROCYTE [DISTWIDTH] IN BLOOD BY AUTOMATED COUNT: 14.8 % (ref 10–15)
GFR SERPL CREATININE-BSD FRML MDRD: 37 ML/MIN/1.73M2
GLUCOSE BLD-MCNC: 121 MG/DL (ref 70–99)
HCT VFR BLD AUTO: 29.5 % (ref 40–53)
HGB BLD-MCNC: 10.1 G/DL (ref 13.3–17.7)
MCH RBC QN AUTO: 32.2 PG (ref 26.5–33)
MCHC RBC AUTO-ENTMCNC: 34.2 G/DL (ref 31.5–36.5)
MCV RBC AUTO: 94 FL (ref 78–100)
PLATELET # BLD AUTO: 204 10E3/UL (ref 150–450)
POTASSIUM BLD-SCNC: 4.7 MMOL/L (ref 3.4–5.3)
PROT SERPL-MCNC: 8.2 G/DL (ref 6.8–8.8)
RBC # BLD AUTO: 3.14 10E6/UL (ref 4.4–5.9)
SODIUM SERPL-SCNC: 130 MMOL/L (ref 133–144)
WBC # BLD AUTO: 7 10E3/UL (ref 4–11)

## 2021-11-03 PROCEDURE — 85027 COMPLETE CBC AUTOMATED: CPT

## 2021-11-03 PROCEDURE — 36415 COLL VENOUS BLD VENIPUNCTURE: CPT

## 2021-11-03 PROCEDURE — 80053 COMPREHEN METABOLIC PANEL: CPT

## 2021-11-16 ENCOUNTER — TELEPHONE (OUTPATIENT)
Dept: DERMATOLOGY | Facility: CLINIC | Age: 76
End: 2021-11-16
Payer: MEDICARE

## 2021-11-16 NOTE — TELEPHONE ENCOUNTER
[facility] Call Center    Phone Message    May a detailed message be left on voicemail: yes     Reason for Call: Other: `      Pt's wife calling back from a message left by Charla.    Please return call to Pt to advise. Thanks!    Action Taken: Other: [facility]    Travel Screening: Not Applicable                                                                    ;

## 2021-11-17 DIAGNOSIS — I10 BENIGN ESSENTIAL HYPERTENSION: ICD-10-CM

## 2021-11-18 NOTE — TELEPHONE ENCOUNTER
Routing refill request to provider for review/approval because:  Labs out of range:  creat  BP above goal.  NATALIE Calderon RN

## 2021-11-19 ENCOUNTER — TRANSFERRED RECORDS (OUTPATIENT)
Dept: HEALTH INFORMATION MANAGEMENT | Facility: CLINIC | Age: 76
End: 2021-11-19
Payer: MEDICARE

## 2021-11-19 LAB — RETINOPATHY: NEGATIVE

## 2021-11-19 RX ORDER — CLONIDINE HYDROCHLORIDE 0.1 MG/1
TABLET ORAL
Qty: 60 TABLET | Refills: 1 | Status: SHIPPED | OUTPATIENT
Start: 2021-11-19 | End: 2022-01-17

## 2021-11-22 DIAGNOSIS — Z79.4 TYPE 2 DIABETES MELLITUS WITH DIABETIC NEPHROPATHY, WITH LONG-TERM CURRENT USE OF INSULIN (H): ICD-10-CM

## 2021-11-22 DIAGNOSIS — E11.21 TYPE 2 DIABETES MELLITUS WITH DIABETIC NEPHROPATHY, WITH LONG-TERM CURRENT USE OF INSULIN (H): ICD-10-CM

## 2021-11-22 DIAGNOSIS — I10 BENIGN ESSENTIAL HYPERTENSION: ICD-10-CM

## 2021-11-22 DIAGNOSIS — N18.1 CKD (CHRONIC KIDNEY DISEASE) STAGE 1, GFR 90 ML/MIN OR GREATER: ICD-10-CM

## 2021-11-24 RX ORDER — LISINOPRIL 40 MG/1
TABLET ORAL
Qty: 90 TABLET | Refills: 0 | Status: SHIPPED | OUTPATIENT
Start: 2021-11-24

## 2021-11-24 RX ORDER — GLIPIZIDE 5 MG/1
TABLET, FILM COATED, EXTENDED RELEASE ORAL
Qty: 90 TABLET | Refills: 0 | Status: SHIPPED | OUTPATIENT
Start: 2021-11-24

## 2021-11-24 RX ORDER — AMLODIPINE BESYLATE 5 MG/1
10 TABLET ORAL DAILY
Qty: 180 TABLET | Refills: 0 | Status: SHIPPED | OUTPATIENT
Start: 2021-11-24

## 2021-11-24 NOTE — TELEPHONE ENCOUNTER
"Requested Prescriptions   Pending Prescriptions Disp Refills    glipiZIDE (GLUCOTROL XL) 5 MG 24 hr tablet [Pharmacy Med Name: GLIPIZIDE ER 5 MG Tablet Extended Release 24 Hour] 90 tablet 0     Sig: TAKE 1 TABLET EVERY DAY        Sulfonylurea Agents Failed - 11/22/2021  3:25 PM        Failed - Patient has documented A1c within the specified period of time.     If HgbA1C is 8 or greater, it needs to be on file within the past 3 months.  If less than 8, must be on file within the past 6 months.     Recent Labs   Lab Test 05/18/21  0609   A1C 5.4             Failed - Patient has a recent creatinine (normal) within the past 12 mos.     Recent Labs   Lab Test 11/03/21  1136   CR 1.75*       Ok to refill medication if creatinine is low          Passed - Medication is active on med list        Passed - Patient is age 18 or older        Passed - Recent (6 mo) or future (30 days) visit within the authorizing provider's specialty     Patient had office visit in the last 6 months or has a visit in the next 30 days with authorizing provider or within the authorizing provider's specialty.  See \"Patient Info\" tab in inbasket, or \"Choose Columns\" in Meds & Orders section of the refill encounter.               lisinopril (ZESTRIL) 40 MG tablet [Pharmacy Med Name: LISINOPRIL 40 MG Tablet] 90 tablet 0     Sig: TAKE 1 TABLET EVERY DAY        ACE Inhibitors (Including Combos) Protocol Failed - 11/22/2021  3:25 PM        Failed - Blood pressure under 140/90 in past 12 months       BP Readings from Last 3 Encounters:   10/21/21 (!) 151/67   09/23/21 (!) 146/69   07/26/21 131/57                 Failed - Normal serum creatinine on file in past 12 months     Recent Labs   Lab Test 11/03/21  1136   CR 1.75*       Ok to refill medication if creatinine is low          Passed - Recent (12 mo) or future (30 days) visit within the authorizing provider's specialty     Patient has had an office visit with the authorizing provider or a provider " "within the authorizing providers department within the previous 12 mos or has a future within next 30 days. See \"Patient Info\" tab in inbasket, or \"Choose Columns\" in Meds & Orders section of the refill encounter.              Passed - Medication is active on med list        Passed - Patient is age 18 or older        Passed - Normal serum potassium on file in past 12 months     Recent Labs   Lab Test 11/03/21  1136   POTASSIUM 4.7                  amLODIPine (NORVASC) 5 MG tablet [Pharmacy Med Name: AMLODIPINE BESYLATE 5 MG Tablet] 180 tablet 0     Sig: TAKE 2 TABLETS (10 MG) BY MOUTH DAILY        Calcium Channel Blockers Protocol  Failed - 11/22/2021  3:25 PM        Failed - Blood pressure under 140/90 in past 12 months       BP Readings from Last 3 Encounters:   10/21/21 (!) 151/67   09/23/21 (!) 146/69   07/26/21 131/57                 Failed - Normal serum creatinine on file in past 12 months     Recent Labs   Lab Test 11/03/21  1136   CR 1.75*       Ok to refill medication if creatinine is low          Passed - Recent (12 mo) or future (30 days) visit within the authorizing provider's specialty     Patient has had an office visit with the authorizing provider or a provider within the authorizing providers department within the previous 12 mos or has a future within next 30 days. See \"Patient Info\" tab in inbasket, or \"Choose Columns\" in Meds & Orders section of the refill encounter.              Passed - Medication is active on med list        Passed - Patient is age 18 or older            Tarsha HILL RN    "

## 2021-11-27 DIAGNOSIS — E78.00 PURE HYPERCHOLESTEROLEMIA: ICD-10-CM

## 2021-11-30 RX ORDER — ATORVASTATIN CALCIUM 20 MG/1
TABLET, FILM COATED ORAL
Qty: 90 TABLET | Refills: 0 | Status: SHIPPED | OUTPATIENT
Start: 2021-11-30 | End: 2022-02-11

## 2021-12-08 ENCOUNTER — LAB (OUTPATIENT)
Dept: LAB | Facility: CLINIC | Age: 76
End: 2021-12-08
Payer: MEDICARE

## 2021-12-08 DIAGNOSIS — Z51.81 THERAPEUTIC DRUG MONITORING: ICD-10-CM

## 2021-12-08 DIAGNOSIS — E78.00 PURE HYPERCHOLESTEROLEMIA: ICD-10-CM

## 2021-12-08 LAB
ALBUMIN SERPL-MCNC: 3.3 G/DL (ref 3.4–5)
ALP SERPL-CCNC: 140 U/L (ref 40–150)
ALT SERPL W P-5'-P-CCNC: 31 U/L (ref 0–70)
ANION GAP SERPL CALCULATED.3IONS-SCNC: 7 MMOL/L (ref 3–14)
AST SERPL W P-5'-P-CCNC: 17 U/L (ref 0–45)
BILIRUB SERPL-MCNC: 0.5 MG/DL (ref 0.2–1.3)
BUN SERPL-MCNC: 38 MG/DL (ref 7–30)
CALCIUM SERPL-MCNC: 9.1 MG/DL (ref 8.5–10.1)
CHLORIDE BLD-SCNC: 99 MMOL/L (ref 94–109)
CHOLEST SERPL-MCNC: 108 MG/DL
CO2 SERPL-SCNC: 25 MMOL/L (ref 20–32)
CREAT SERPL-MCNC: 1.76 MG/DL (ref 0.66–1.25)
ERYTHROCYTE [DISTWIDTH] IN BLOOD BY AUTOMATED COUNT: 15.2 % (ref 10–15)
FASTING STATUS PATIENT QL REPORTED: NO
GFR SERPL CREATININE-BSD FRML MDRD: 37 ML/MIN/1.73M2
GLUCOSE BLD-MCNC: 99 MG/DL (ref 70–99)
HCT VFR BLD AUTO: 26.7 % (ref 40–53)
HDLC SERPL-MCNC: 52 MG/DL
HGB BLD-MCNC: 9.3 G/DL (ref 13.3–17.7)
LDLC SERPL CALC-MCNC: 47 MG/DL
MCH RBC QN AUTO: 33.5 PG (ref 26.5–33)
MCHC RBC AUTO-ENTMCNC: 34.8 G/DL (ref 31.5–36.5)
MCV RBC AUTO: 96 FL (ref 78–100)
NONHDLC SERPL-MCNC: 56 MG/DL
PLATELET # BLD AUTO: 182 10E3/UL (ref 150–450)
POTASSIUM BLD-SCNC: 4.8 MMOL/L (ref 3.4–5.3)
PROT SERPL-MCNC: 8 G/DL (ref 6.8–8.8)
RBC # BLD AUTO: 2.78 10E6/UL (ref 4.4–5.9)
SODIUM SERPL-SCNC: 131 MMOL/L (ref 133–144)
TRIGL SERPL-MCNC: 47 MG/DL
WBC # BLD AUTO: 5.9 10E3/UL (ref 4–11)

## 2021-12-08 PROCEDURE — 36415 COLL VENOUS BLD VENIPUNCTURE: CPT

## 2021-12-08 PROCEDURE — 80053 COMPREHEN METABOLIC PANEL: CPT

## 2021-12-08 PROCEDURE — 85027 COMPLETE CBC AUTOMATED: CPT

## 2021-12-08 PROCEDURE — 80061 LIPID PANEL: CPT

## 2021-12-09 ENCOUNTER — OFFICE VISIT (OUTPATIENT)
Dept: URGENT CARE | Facility: URGENT CARE | Age: 76
End: 2021-12-09
Payer: MEDICARE

## 2021-12-09 ENCOUNTER — HOSPITAL ENCOUNTER (EMERGENCY)
Facility: CLINIC | Age: 76
Discharge: HOME | End: 2021-12-09
Attending: NURSE PRACTITIONER | Admitting: NURSE PRACTITIONER
Payer: MEDICARE

## 2021-12-09 VITALS
RESPIRATION RATE: 18 BRPM | TEMPERATURE: 97.1 F | DIASTOLIC BLOOD PRESSURE: 76 MMHG | SYSTOLIC BLOOD PRESSURE: 135 MMHG | OXYGEN SATURATION: 98 % | BODY MASS INDEX: 28.93 KG/M2 | HEART RATE: 62 BPM | WEIGHT: 180 LBS | HEIGHT: 66 IN

## 2021-12-09 VITALS
WEIGHT: 179.2 LBS | BODY MASS INDEX: 28.92 KG/M2 | SYSTOLIC BLOOD PRESSURE: 148 MMHG | HEART RATE: 48 BPM | RESPIRATION RATE: 16 BRPM | OXYGEN SATURATION: 100 % | DIASTOLIC BLOOD PRESSURE: 72 MMHG | TEMPERATURE: 97.4 F

## 2021-12-09 DIAGNOSIS — M65.949 TENOSYNOVITIS OF HAND: Primary | ICD-10-CM

## 2021-12-09 DIAGNOSIS — L03.119 CELLULITIS OF HAND: ICD-10-CM

## 2021-12-09 LAB
BASOPHILS # BLD AUTO: 0.1 10E3/UL (ref 0–0.2)
BASOPHILS NFR BLD AUTO: 1 %
CRP SERPL-MCNC: 8.1 MG/L (ref 0–8)
EOSINOPHIL # BLD AUTO: 0.5 10E3/UL (ref 0–0.7)
EOSINOPHIL NFR BLD AUTO: 6 %
ERYTHROCYTE [DISTWIDTH] IN BLOOD BY AUTOMATED COUNT: 15.4 % (ref 10–15)
HCT VFR BLD AUTO: 29.2 % (ref 40–53)
HGB BLD-MCNC: 9.8 G/DL (ref 13.3–17.7)
IMM GRANULOCYTES # BLD: 0.1 10E3/UL
IMM GRANULOCYTES NFR BLD: 1 %
LYMPHOCYTES # BLD AUTO: 1.1 10E3/UL (ref 0.8–5.3)
LYMPHOCYTES NFR BLD AUTO: 13 %
MCH RBC QN AUTO: 32.6 PG (ref 26.5–33)
MCHC RBC AUTO-ENTMCNC: 33.6 G/DL (ref 31.5–36.5)
MCV RBC AUTO: 97 FL (ref 78–100)
MONOCYTES # BLD AUTO: 0.9 10E3/UL (ref 0–1.3)
MONOCYTES NFR BLD AUTO: 10 %
NEUTROPHILS # BLD AUTO: 5.6 10E3/UL (ref 1.6–8.3)
NEUTROPHILS NFR BLD AUTO: 69 %
NRBC # BLD AUTO: 0 10E3/UL
NRBC BLD AUTO-RTO: 0 /100
PLATELET # BLD AUTO: 181 10E3/UL (ref 150–450)
RBC # BLD AUTO: 3.01 10E6/UL (ref 4.4–5.9)
WBC # BLD AUTO: 8.2 10E3/UL (ref 4–11)

## 2021-12-09 PROCEDURE — 86140 C-REACTIVE PROTEIN: CPT | Performed by: NURSE PRACTITIONER

## 2021-12-09 PROCEDURE — 99284 EMERGENCY DEPT VISIT MOD MDM: CPT | Performed by: NURSE PRACTITIONER

## 2021-12-09 PROCEDURE — 250N000011 HC RX IP 250 OP 636: Performed by: NURSE PRACTITIONER

## 2021-12-09 PROCEDURE — 96365 THER/PROPH/DIAG IV INF INIT: CPT | Performed by: NURSE PRACTITIONER

## 2021-12-09 PROCEDURE — 85025 COMPLETE CBC W/AUTO DIFF WBC: CPT | Performed by: NURSE PRACTITIONER

## 2021-12-09 PROCEDURE — 99213 OFFICE O/P EST LOW 20 MIN: CPT | Performed by: FAMILY MEDICINE

## 2021-12-09 PROCEDURE — 36415 COLL VENOUS BLD VENIPUNCTURE: CPT | Performed by: NURSE PRACTITIONER

## 2021-12-09 PROCEDURE — 99284 EMERGENCY DEPT VISIT MOD MDM: CPT | Mod: 25 | Performed by: NURSE PRACTITIONER

## 2021-12-09 RX ORDER — CEPHALEXIN 500 MG/1
500 CAPSULE ORAL 3 TIMES DAILY
Qty: 30 CAPSULE | Refills: 0 | Status: SHIPPED | OUTPATIENT
Start: 2021-12-09 | End: 2021-12-19

## 2021-12-09 RX ORDER — CEFEPIME HYDROCHLORIDE 1 G/1
1 INJECTION, POWDER, FOR SOLUTION INTRAMUSCULAR; INTRAVENOUS ONCE
Status: COMPLETED | OUTPATIENT
Start: 2021-12-09 | End: 2021-12-09

## 2021-12-09 RX ADMIN — CEFEPIME HYDROCHLORIDE 1 G: 1 INJECTION, POWDER, FOR SOLUTION INTRAMUSCULAR; INTRAVENOUS at 17:33

## 2021-12-09 ASSESSMENT — ENCOUNTER SYMPTOMS
ARTHRALGIAS: 1
MYALGIAS: 1
COLOR CHANGE: 1
JOINT SWELLING: 1

## 2021-12-09 ASSESSMENT — MIFFLIN-ST. JEOR: SCORE: 1489.22

## 2021-12-09 NOTE — ED TRIAGE NOTES
"concern for redness, swelling and pain on left hand. started yesterday. seen by clinic....\"Referral to [facility] emergency department for IV antibiotics and evaluation.  Consider oral antibiotics but given the patient's history of DM2 plus congenital abnormality I think it is wise to consider IV antibiotics, CRP, sed rate, and possible orthopedic hand referral.\"  "

## 2021-12-10 ENCOUNTER — PATIENT OUTREACH (OUTPATIENT)
Dept: CARE COORDINATION | Facility: CLINIC | Age: 76
End: 2021-12-10
Payer: MEDICARE

## 2021-12-10 DIAGNOSIS — Z71.89 OTHER SPECIFIED COUNSELING: ICD-10-CM

## 2021-12-10 NOTE — ED PROVIDER NOTES
"  History     Chief Complaint   Patient presents with     Wound Check     concern for redness, swelling and pain on left hand. started yesterday. seen by clinic....\"Referral to [facility] emergency department for IV antibiotics and evaluation.  Consider oral antibiotics but given the patient's history of DM2 plus congenital abnormality I think it is wise to consider IV antibiotics, CRP, sed rate, and possible orthopedic hand referral.\"     HPI  Sae Milligan is a 76 year old male with history of type 2 diabetes, hyperlipidemia, squamous cell carcinoma, CKD and hypertension who presents to the emergency department for evaluation of left hand pain.  Yesterday patient developed a left hand pain, edema and erythema which is worsened today.  He was evaluated at [place] urgent care and sent for further evaluation.  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  No fevers, numbness, tingling, nausea or vomiting.    Allergies:  Allergies   Allergen Reactions     Penicillins Hives, Itching and Swelling     Of lips       Problem List:    Patient Active Problem List    Diagnosis Date Noted     Benign prostatic hyperplasia with nocturia 05/10/2021     Priority: Medium     Surgery 2021 helped sx         Essential hypertension 01/11/2021     Priority: Medium     Limited on more meds by 50's on pulse on low dose beta blocker and hyponatremia requiring previous hospitalization on thiazide.        Hyponatremia 01/11/2021     Priority: Medium     Runs 130 on avg.  Hospitalization in [place] for low sodium when on diuretic.         CKD (chronic kidney disease) stage 3, GFR 30-59 ml/min (H) 11/13/2019     Priority: Medium     Multiple gastric ulcers 08/26/2019     Priority: Medium     Seen on EGD for some epigastric pain.  None with stigmata of bleeding . Started on PPI.  8/19       Anxiety 12/17/2013     Priority: Medium     Restless leg syndrome " 12/02/2013     Priority: Medium     Family history of prostate cancer 05/13/2013     Priority: Medium     3 brothers  had prostate cancer       Squamous cell carcinoma 10/03/2012     Priority: Medium     Left helix. July 20, 2012       Allergic rhinitis 10/12/2011     Priority: Medium     DJD (degenerative joint disease) 02/09/2011     Priority: Medium     Type 2 diabetes mellitus with diabetic nephropathy (H) 03/14/2005     Priority: Medium              Hyperlipidemia LDL goal <100 03/14/2005     Priority: Medium     Had myalgias from simvastatin, were better immediately after stopping, tolerated Lipitor well       [facility] 06/23/2011     Priority: Low     EMERGENCY CARE PLAN  Presenting Problem Signs and Symptoms Treatment Plan    Questions or conerns during clinic hours    I will call the clinic directly     Questions or conerns outside clinic hours    I will call the 24 hour nurse line at [phone]    Patient needs to schedule an appointment    I will call the 24 hour scheduling team at [phone] or clinic directly    Same day treatment     I will call the clinic first, nurse line if after hours, urgent care and express care if needed                            DX V65.8 REPLACED WITH [id number] [facility] (04/08/2013)          Past Medical History:    Past Medical History:   Diagnosis Date     Allergic rhinitis      Asthma      BPH associated with nocturia      Chronic kidney disease      CKD (chronic kidney disease) stage 3, GFR 30-59 ml/min (H)      Diabetes mellitus (H)      DJD (degenerative joint disease)      Hyperlipidemia      Hypertension      Multiple gastric ulcers      RLS (restless legs syndrome)      Squamous cell carcinoma      Squamous cell carcinoma in situ      Type 2 diabetes mellitus with diabetic neuropathy (H)        Past Surgical History:    Past Surgical History:   Procedure Laterality Date     ARTHROPLASTY KNEE  2/10/2011    ARTHROPLASTY KNEE performed by DARLING FARRELL  at [facility]     ARTHROPLASTY KNEE       COLONOSCOPY N/A 3/2/2018    Procedure: COLONOSCOPY;  colonoscopy;  Surgeon: Aris Whittaker MD;  Location: [facility]     COLONOSCOPY       CYSTOSCOPY N/A 10/12/2020    Procedure: CYSTOSCOPY;  Surgeon: Aris Hawkins MD;  Location: [facility];  Service: Urology     ESOPHAGOSCOPY, GASTROSCOPY, DUODENOSCOPY (EGD), COMBINED N/A 2019    Procedure: ESOPHAGOGASTRODUODENOSCOPY, WITH BIOPSY;  Surgeon: John Christianson DO;  Location: [facility]     ESOPHAGOSCOPY, GASTROSCOPY, DUODENOSCOPY (EGD), COMBINED        TRANSURETHRAL ELEC-SURG PROSTATECTOM N/A 2021    Procedure: CYSTOSCOPY, BIPOLAR TRANSURETHRAL RESECTION OF PROSTATE;  Surgeon: Aris Hawkins MD;  Location: [facility];  Service: Urology     JOINT REPLACEMENT      Bilateral Knees     MOHS MICROGRAPHIC PROCEDURE  2012    Sq. cell left helix     MOHS MICROGRAPHIC PROCEDURE       ORTHOPEDIC SURGERY       ORTHOPEDIC SURGERY       SURGICAL HISTORY OF -   2003    Cervical spine fx       Family History:    Family History   Problem Relation Age of Onset     Hypertension Mother      Cerebrovascular Disease Mother      Hypertension Father      Cerebrovascular Disease Father      Diabetes Brother      Prostate Cancer Brother      Prostate Cancer Brother        Social History:  Marital Status:   [2]  Social History     Tobacco Use     Smoking status: Former Smoker     Quit date: 3/14/1990     Years since quittin.7     Smokeless tobacco: Never Used   Substance Use Topics     Alcohol use: Yes     Comment: occasional     Drug use: No        Medications:    cephALEXin (KEFLEX) 500 MG capsule  ACCU-CHEK ANTONIA PLUS test strip  ACCU-CHEK ANTONIA test strip  albuterol (PROAIR HFA/PROVENTIL HFA/VENTOLIN HFA) 108 (90 Base) MCG/ACT inhaler  albuterol (PROVENTIL) (2.5 MG/3ML) 0.083% neb solution  amLODIPine (NORVASC) 5 MG tablet  aspirin (ASA) 81 MG tablet  atorvastatin (LIPITOR) 20 MG  "tablet  augmented betamethasone dipropionate (DIPROLENE-AF) 0.05 % external cream  B Complex Vitamins (VITAMIN B COMPLEX PO)  CALCIUM CARBONATE-VITAMIN D 500-200 MG-UNIT OR TABS  cloNIDine (CATAPRES) 0.1 MG tablet  fluticasone (FLONASE) 50 MCG/ACT nasal spray  folic acid (FOLVITE) 1 MG tablet  glipiZIDE (GLUCOTROL XL) 5 MG 24 hr tablet  ketoconazole (NIZORAL) 2 % external cream  lisinopril (ZESTRIL) 40 MG tablet  magnesium 500 MG TABS  metFORMIN (GLUCOPHAGE) 500 MG tablet  methotrexate sodium 2.5 MG TABS  methotrexate sodium 2.5 MG TABS  metoprolol tartrate (LOPRESSOR) 25 MG tablet  omeprazole (PRILOSEC) 20 MG DR capsule  order for DME  Potassium Gluconate 595 MG CAPS  predniSONE (DELTASONE) 20 MG tablet  Selenium 200 MCG TABS  tamsulosin (FLOMAX) 0.4 MG capsule          Review of Systems   Musculoskeletal: Positive for arthralgias, joint swelling and myalgias.   Skin: Positive for color change.   All other systems reviewed and are negative.      Physical Exam   BP: 131/77  Pulse: 54  Temp: 97.1  F (36.2  C)  Resp: 16  Height: 167.6 cm (5' 6\")  Weight: 81.6 kg (180 lb)  SpO2: 100 %    Physical Exam  Constitutional:       General: He is not in acute distress.     Appearance: Normal appearance.   Cardiovascular:      Rate and Rhythm: Normal rate.   Pulmonary:      Effort: Pulmonary effort is normal.   Musculoskeletal:      Left hand: Swelling, deformity (Congenital abnormality as described above), tenderness and bony tenderness present. Decreased range of motion (d/t pain). Normal strength. Normal sensation. There is no disruption of two-point discrimination. Normal capillary refill. Normal pulse.      Comments: Moderate amount of edema to the PCP joint with erythema and tender to touch. About 1 inch of streaking redness that is nontender.    Skin:     General: Skin is warm.      Capillary Refill: Capillary refill takes less than 2 seconds.   Neurological:      General: No focal deficit present.      Mental Status: He " is alert.         ED Course                 Procedures    Results for orders placed or performed during the hospital encounter of 12/09/21 (from the past 24 hour(s))   CBC with platelets, differential    Narrative    The following orders were created for panel order CBC with platelets, differential.  Procedure                               Abnormality         Status                     ---------                               -----------         ------                     CBC with platelets and d...[[id number]]  Abnormal            Final result                 Please view results for these tests on the individual orders.   CRP Inflammation   Result Value Ref Range    CRP Inflammation 8.1 (H) 0.0 - 8.0 mg/L   CBC with platelets and differential   Result Value Ref Range    WBC Count 8.2 4.0 - 11.0 10e3/uL    RBC Count 3.01 (L) 4.40 - 5.90 10e6/uL    Hemoglobin 9.8 (L) 13.3 - 17.7 g/dL    Hematocrit 29.2 (L) 40.0 - 53.0 %    MCV 97 78 - 100 fL    MCH 32.6 26.5 - 33.0 pg    MCHC 33.6 31.5 - 36.5 g/dL    RDW 15.4 (H) 10.0 - 15.0 %    Platelet Count 181 150 - 450 10e3/uL    % Neutrophils 69 %    % Lymphocytes 13 %    % Monocytes 10 %    % Eosinophils 6 %    % Basophils 1 %    % Immature Granulocytes 1 %    NRBCs per 100 WBC 0 <1 /100    Absolute Neutrophils 5.6 1.6 - 8.3 10e3/uL    Absolute Lymphocytes 1.1 0.8 - 5.3 10e3/uL    Absolute Monocytes 0.9 0.0 - 1.3 10e3/uL    Absolute Eosinophils 0.5 0.0 - 0.7 10e3/uL    Absolute Basophils 0.1 0.0 - 0.2 10e3/uL    Absolute Immature Granulocytes 0.1 <=0.4 10e3/uL    Absolute NRBCs 0.0 10e3/uL       Medications   ceFEPIme (MAXIPIME) 1g vial to attach to  ml bag for ADULTS or NS 50 ml bag for PEDS (0 g Intravenous Stopped 12/9/21 1841)       Assessments & Plan (with Medical Decision Making)   Sae Milligan is a 76 year old male with history of type 2 diabetes, hyperlipidemia, squamous cell carcinoma, CKD and hypertension who presents to the emergency department for evaluation  of left hand pain.  Yesterday patient developed a left hand pain, edema and erythema which is worsened today. Vital signs normal, exam as above. Patient is overall well appearing. WBC remains normal at 8.2. CRP only mildly elevated at 8.1. Single dose of IV cefepime given in the department. Rx for oral Keflex. Follow up in 3 days for reevaluation. Return to the department sooner if symptoms worsen. Patient is agreeable to plan of care and discharged in good condition.      I have reviewed the nursing notes.    I have reviewed the findings, diagnosis, plan and need for follow up with the patient.  Discharge Medication List as of 12/9/2021  6:41 PM      START taking these medications    Details   cephALEXin (KEFLEX) 500 MG capsule Take 1 capsule (500 mg) by mouth 3 times daily for 10 days, Disp-30 capsule, R-0, E-Prescribe             Final diagnoses:   Cellulitis of hand       12/9/2021   [facility] EMERGENCY DEPT     Mercy Castro, APRN CNP  12/09/21 1954

## 2021-12-10 NOTE — PROGRESS NOTES
Clinic Care Coordination Contact  [facility]/Voicemail       Clinical Data: Care Coordinator Outreach  Outreach attempted x 1. Unable to leave  message on patient's voicemail with call back information and requested return call.  Plan:  Care Coordinator will try to reach patient again in 1-2 business days.      ALLY Ferrell  [phone]  [facility]

## 2021-12-11 NOTE — PROGRESS NOTES
Clinic Care Coordination Contact  [facility]/Voicemail       Clinical Data: Care Coordinator Outreach  Outreach attempted x 2.  Unable to leave a message on patient's voicemail with call back information and requested return call.  Plan:  Care Coordinator will do no further outreaches at this time.    [address] Community Health Worker  Clinic Care Coordination  [facility]  Phone: [phone]

## 2021-12-20 ENCOUNTER — OFFICE VISIT (OUTPATIENT)
Dept: DERMATOLOGY | Facility: CLINIC | Age: 76
End: 2021-12-20
Payer: MEDICARE

## 2021-12-20 VITALS — DIASTOLIC BLOOD PRESSURE: 74 MMHG | OXYGEN SATURATION: 100 % | SYSTOLIC BLOOD PRESSURE: 161 MMHG | HEART RATE: 57 BPM

## 2021-12-20 DIAGNOSIS — Z51.81 THERAPEUTIC DRUG MONITORING: Primary | ICD-10-CM

## 2021-12-20 DIAGNOSIS — L30.0 NUMMULAR DERMATITIS: ICD-10-CM

## 2021-12-20 LAB
ALBUMIN SERPL-MCNC: 3.5 G/DL (ref 3.4–5)
ALP SERPL-CCNC: 123 U/L (ref 40–150)
ALT SERPL W P-5'-P-CCNC: 40 U/L (ref 0–70)
ANION GAP SERPL CALCULATED.3IONS-SCNC: 6 MMOL/L (ref 3–14)
AST SERPL W P-5'-P-CCNC: 24 U/L (ref 0–45)
BILIRUB SERPL-MCNC: 0.4 MG/DL (ref 0.2–1.3)
BUN SERPL-MCNC: 43 MG/DL (ref 7–30)
CALCIUM SERPL-MCNC: 9.2 MG/DL (ref 8.5–10.1)
CHLORIDE BLD-SCNC: 100 MMOL/L (ref 94–109)
CO2 SERPL-SCNC: 25 MMOL/L (ref 20–32)
CREAT SERPL-MCNC: 1.7 MG/DL (ref 0.66–1.25)
ERYTHROCYTE [DISTWIDTH] IN BLOOD BY AUTOMATED COUNT: 14 % (ref 10–15)
GFR SERPL CREATININE-BSD FRML MDRD: 38 ML/MIN/1.73M2
GLUCOSE BLD-MCNC: 154 MG/DL (ref 70–99)
HCT VFR BLD AUTO: 28.2 % (ref 40–53)
HGB BLD-MCNC: 9.5 G/DL (ref 13.3–17.7)
MCH RBC QN AUTO: 33.3 PG (ref 26.5–33)
MCHC RBC AUTO-ENTMCNC: 33.7 G/DL (ref 31.5–36.5)
MCV RBC AUTO: 99 FL (ref 78–100)
PLATELET # BLD AUTO: 201 10E3/UL (ref 150–450)
POTASSIUM BLD-SCNC: 4.8 MMOL/L (ref 3.4–5.3)
PROT SERPL-MCNC: 8.2 G/DL (ref 6.8–8.8)
RBC # BLD AUTO: 2.85 10E6/UL (ref 4.4–5.9)
SODIUM SERPL-SCNC: 131 MMOL/L (ref 133–144)
WBC # BLD AUTO: 7.6 10E3/UL (ref 4–11)

## 2021-12-20 PROCEDURE — 80053 COMPREHEN METABOLIC PANEL: CPT | Performed by: PHYSICIAN ASSISTANT

## 2021-12-20 PROCEDURE — 99214 OFFICE O/P EST MOD 30 MIN: CPT | Performed by: PHYSICIAN ASSISTANT

## 2021-12-20 PROCEDURE — 85027 COMPLETE CBC AUTOMATED: CPT | Performed by: PHYSICIAN ASSISTANT

## 2021-12-20 PROCEDURE — 36415 COLL VENOUS BLD VENIPUNCTURE: CPT | Performed by: PHYSICIAN ASSISTANT

## 2021-12-20 NOTE — LETTER
12/20/2021         RE: Sae Milligan  [address] [address]        Dear Colleague,    Thank you for referring your patient, Sae Milligan, to the [facility]. Please see a copy of my visit note below.    HPI:   Chief complaints: Sae Milligan is a pleasant 76 year old male who presents for recheck of nummular dermatitis and dyshidrotic eczema. He is currently taking 3 tablets methotrexate weekly and skin is doing great. His creatinine was elevating and his hemoglobin has been dropping so we decreased his dose from 5 tablets to 3 tablets. No adverse effects.       PHYSICAL EXAM:    BP (!) 161/74   Pulse 57   SpO2 100%   Skin exam performed as follows: Type 2 skin. Mood appropriate  Alert and Oriented X 3. Well developed, well nourished in no distress.  General appearance: Normal  Head including face: Normal  Eyes: conjunctiva and lids: Normal  Mouth: Lips, teeth, gums: Normal  Neck: Normal  Chest-breast/axillae: Normal  Back: Normal  Spleen and liver: Normal  Cardiovascular: Exam of peripheral vascular system by observation for swelling, varicosities, edema: Normal  Genitalia: groin, buttocks: Normal  Extremities: digits/nails (clubbing): Normal  Eccrine and Apocrine glands: Normal  Right upper extremity: Normal  Left upper extremity: Normal  Right lower extremity: Normal  Left lower extremity: Normal  Skin: Scalp and body hair: See below    1. Faint xerosis on bilateral palms; resolving dermatitis on the feet     ASSESSMENT/PLAN:     1. Nummular dermatitis on the back, dyshidrotic eczema on the feet - doing great on methotrexate. He is pleased with results thus far.      --Continue 3 tablets methotrexate weekly  --Folic acid on all other days  --CBC, CMP checked today  --Advised on potential side effects of increased infection (decreased immunity), liver dysfunction/damage, oncogenesis, decreased blood counts. Advised to avoid EtOH and acetaminophen.         2. Sae  to follow up with Primary Care provider regarding elevated blood pressure.        Follow-up: 6 mo  CC:   Scribed By: Letitia Garcia, MS, PAMEKA          Again, thank you for allowing me to participate in the care of your patient.        Sincerely,        Letitia Garcia PA-C

## 2021-12-20 NOTE — PROGRESS NOTES
HPI:   Chief complaints: Sae Milligan is a pleasant 76 year old male who presents for recheck of nummular dermatitis and dyshidrotic eczema. He is currently taking 3 tablets methotrexate weekly and skin is doing great. His creatinine was elevating and his hemoglobin has been dropping so we decreased his dose from 5 tablets to 3 tablets. No adverse effects.       PHYSICAL EXAM:    BP (!) 161/74   Pulse 57   SpO2 100%   Skin exam performed as follows: Type 2 skin. Mood appropriate  Alert and Oriented X 3. Well developed, well nourished in no distress.  General appearance: Normal  Head including face: Normal  Eyes: conjunctiva and lids: Normal  Mouth: Lips, teeth, gums: Normal  Neck: Normal  Chest-breast/axillae: Normal  Back: Normal  Spleen and liver: Normal  Cardiovascular: Exam of peripheral vascular system by observation for swelling, varicosities, edema: Normal  Genitalia: groin, buttocks: Normal  Extremities: digits/nails (clubbing): Normal  Eccrine and Apocrine glands: Normal  Right upper extremity: Normal  Left upper extremity: Normal  Right lower extremity: Normal  Left lower extremity: Normal  Skin: Scalp and body hair: See below    1. Faint xerosis on bilateral palms; resolving dermatitis on the feet     ASSESSMENT/PLAN:     1. Nummular dermatitis on the back, dyshidrotic eczema on the feet - doing great on methotrexate. He is pleased with results thus far.      --Continue 3 tablets methotrexate weekly  --Folic acid on all other days  --CBC, CMP checked today  --Advised on potential side effects of increased infection (decreased immunity), liver dysfunction/damage, oncogenesis, decreased blood counts. Advised to avoid EtOH and acetaminophen.         2. Sae to follow up with Primary Care provider regarding elevated blood pressure.        Follow-up: 6 mo  CC:   Scribed By: Letitia Garcia, MS, PA-C

## 2021-12-20 NOTE — LETTER
"December 21, 2021      Sae Milligan  [address] [address]        Dear ,        We are writing to inform you of your test results. We were unable to reach you by phone.    \"Kidney functions are a tad better; I'd still like to recheck. I think he has a lab appt in 2 weeks - have him keep this.\"      Resulted Orders   CBC with platelets   Result Value Ref Range    WBC Count 7.6 4.0 - 11.0 10e3/uL    RBC Count 2.85 (L) 4.40 - 5.90 10e6/uL    Hemoglobin 9.5 (L) 13.3 - 17.7 g/dL    Hematocrit 28.2 (L) 40.0 - 53.0 %    MCV 99 78 - 100 fL    MCH 33.3 (H) 26.5 - 33.0 pg    MCHC 33.7 31.5 - 36.5 g/dL    RDW 14.0 10.0 - 15.0 %    Platelet Count 201 150 - 450 10e3/uL   Comprehensive metabolic panel   Result Value Ref Range    Sodium 131 (L) 133 - 144 mmol/L    Potassium 4.8 3.4 - 5.3 mmol/L    Chloride 100 94 - 109 mmol/L    Carbon Dioxide (CO2) 25 20 - 32 mmol/L    Anion Gap 6 3 - 14 mmol/L    Urea Nitrogen 43 (H) 7 - 30 mg/dL    Creatinine 1.70 (H) 0.66 - 1.25 mg/dL    Calcium 9.2 8.5 - 10.1 mg/dL    Glucose 154 (H) 70 - 99 mg/dL    Alkaline Phosphatase 123 40 - 150 U/L    AST 24 0 - 45 U/L    ALT 40 0 - 70 U/L    Protein Total 8.2 6.8 - 8.8 g/dL    Albumin 3.5 3.4 - 5.0 g/dL    Bilirubin Total 0.4 0.2 - 1.3 mg/dL    GFR Estimate 38 (L) >60 mL/min/1.73m2      Comment:      As of July 11, 2021, eGFR is calculated by the CKD-EPI creatinine equation, without race adjustment. eGFR can be influenced by muscle mass, exercise, and diet. The reported eGFR is an estimation only and is only applicable if the renal function is stable.       If you have any questions or concerns, please call the clinic at the number listed above.       Sincerely,      Letitia Garcia PA-C/Pati PARRISH,  BENJAMIN          "

## 2021-12-28 ENCOUNTER — LAB (OUTPATIENT)
Dept: LAB | Facility: CLINIC | Age: 76
End: 2021-12-28
Payer: MEDICARE

## 2021-12-28 DIAGNOSIS — Z51.81 THERAPEUTIC DRUG MONITORING: ICD-10-CM

## 2021-12-28 LAB
ERYTHROCYTE [DISTWIDTH] IN BLOOD BY AUTOMATED COUNT: 14.1 % (ref 10–15)
HCT VFR BLD AUTO: 27.9 % (ref 40–53)
HGB BLD-MCNC: 9.5 G/DL (ref 13.3–17.7)
MCH RBC QN AUTO: 33.5 PG (ref 26.5–33)
MCHC RBC AUTO-ENTMCNC: 34.1 G/DL (ref 31.5–36.5)
MCV RBC AUTO: 98 FL (ref 78–100)
PLATELET # BLD AUTO: 174 10E3/UL (ref 150–450)
RBC # BLD AUTO: 2.84 10E6/UL (ref 4.4–5.9)
WBC # BLD AUTO: 7.6 10E3/UL (ref 4–11)

## 2021-12-28 PROCEDURE — 36415 COLL VENOUS BLD VENIPUNCTURE: CPT

## 2021-12-28 PROCEDURE — 85027 COMPLETE CBC AUTOMATED: CPT

## 2021-12-28 PROCEDURE — 80053 COMPREHEN METABOLIC PANEL: CPT

## 2021-12-29 LAB
ALBUMIN SERPL-MCNC: 3.5 G/DL (ref 3.4–5)
ALP SERPL-CCNC: 120 U/L (ref 40–150)
ALT SERPL W P-5'-P-CCNC: 32 U/L (ref 0–70)
ANION GAP SERPL CALCULATED.3IONS-SCNC: 6 MMOL/L (ref 3–14)
AST SERPL W P-5'-P-CCNC: 18 U/L (ref 0–45)
BILIRUB SERPL-MCNC: 0.4 MG/DL (ref 0.2–1.3)
BUN SERPL-MCNC: 46 MG/DL (ref 7–30)
CALCIUM SERPL-MCNC: 9 MG/DL (ref 8.5–10.1)
CHLORIDE BLD-SCNC: 104 MMOL/L (ref 94–109)
CO2 SERPL-SCNC: 27 MMOL/L (ref 20–32)
CREAT SERPL-MCNC: 1.74 MG/DL (ref 0.66–1.25)
GFR SERPL CREATININE-BSD FRML MDRD: 40 ML/MIN/1.73M2
GLUCOSE BLD-MCNC: 114 MG/DL (ref 70–99)
POTASSIUM BLD-SCNC: 5 MMOL/L (ref 3.4–5.3)
PROT SERPL-MCNC: 8 G/DL (ref 6.8–8.8)
SODIUM SERPL-SCNC: 137 MMOL/L (ref 133–144)

## 2021-12-31 ENCOUNTER — TELEPHONE (OUTPATIENT)
Dept: FAMILY MEDICINE | Facility: CLINIC | Age: 76
End: 2021-12-31
Payer: MEDICARE

## 2021-12-31 NOTE — TELEPHONE ENCOUNTER
Patient Just wants his 12/8, 12/20, and  12/28 Lab results placed at the . Please call him when they are ready for him to .    Kymberly Chaparro CSS on 12/31/2021 at 8:48 AM

## 2022-01-03 ENCOUNTER — ALLIED HEALTH/NURSE VISIT (OUTPATIENT)
Dept: FAMILY MEDICINE | Facility: CLINIC | Age: 77
End: 2022-01-03
Payer: MEDICARE

## 2022-01-03 VITALS — DIASTOLIC BLOOD PRESSURE: 70 MMHG | SYSTOLIC BLOOD PRESSURE: 122 MMHG

## 2022-01-03 DIAGNOSIS — I10 ESSENTIAL HYPERTENSION: Primary | ICD-10-CM

## 2022-01-03 PROCEDURE — 99207 PR NO CHARGE NURSE ONLY: CPT | Performed by: FAMILY MEDICINE

## 2022-01-03 NOTE — PROGRESS NOTES
Patient Quality Outreach    Patient is due for the following:   Hypertension -  BP check    NEXT STEPS:   none     Type of outreach:    pt is getting care elswhere       Questions for provider review:    None     Stephania Douglas MA

## 2022-01-08 ENCOUNTER — HEALTH MAINTENANCE LETTER (OUTPATIENT)
Age: 77
End: 2022-01-08

## 2022-01-12 ENCOUNTER — MEDICAL CORRESPONDENCE (OUTPATIENT)
Dept: HEALTH INFORMATION MANAGEMENT | Facility: CLINIC | Age: 77
End: 2022-01-12
Payer: MEDICARE

## 2022-01-12 VITALS — WEIGHT: 182.7 LBS | BODY MASS INDEX: 29.36 KG/M2 | HEIGHT: 66 IN

## 2022-01-12 DIAGNOSIS — I10 BENIGN ESSENTIAL HYPERTENSION: ICD-10-CM

## 2022-01-17 RX ORDER — CLONIDINE HYDROCHLORIDE 0.1 MG/1
TABLET ORAL
Qty: 120 TABLET | Refills: 0 | Status: SHIPPED | OUTPATIENT
Start: 2022-01-17

## 2022-01-17 NOTE — TELEPHONE ENCOUNTER
Routing refill request to provider for review/approval because:  Labs out of range:  nan Calderon RN

## 2022-01-18 VITALS — BODY MASS INDEX: 28.77 KG/M2 | WEIGHT: 179 LBS | HEIGHT: 66 IN

## 2022-01-20 ENCOUNTER — LAB (OUTPATIENT)
Dept: LAB | Facility: CLINIC | Age: 77
End: 2022-01-20
Payer: MEDICARE

## 2022-01-20 DIAGNOSIS — Z51.81 THERAPEUTIC DRUG MONITORING: ICD-10-CM

## 2022-01-20 LAB
ALBUMIN SERPL-MCNC: 3.6 G/DL (ref 3.4–5)
ALP SERPL-CCNC: 158 U/L (ref 40–150)
ALT SERPL W P-5'-P-CCNC: 84 U/L (ref 0–70)
ANION GAP SERPL CALCULATED.3IONS-SCNC: 7 MMOL/L (ref 3–14)
AST SERPL W P-5'-P-CCNC: 87 U/L (ref 0–45)
BILIRUB SERPL-MCNC: 0.6 MG/DL (ref 0.2–1.3)
BUN SERPL-MCNC: 35 MG/DL (ref 7–30)
CALCIUM SERPL-MCNC: 9.2 MG/DL (ref 8.5–10.1)
CHLORIDE BLD-SCNC: 97 MMOL/L (ref 94–109)
CO2 SERPL-SCNC: 24 MMOL/L (ref 20–32)
CREAT SERPL-MCNC: 1.55 MG/DL (ref 0.66–1.25)
ERYTHROCYTE [DISTWIDTH] IN BLOOD BY AUTOMATED COUNT: 13.3 % (ref 10–15)
GFR SERPL CREATININE-BSD FRML MDRD: 46 ML/MIN/1.73M2
GLUCOSE BLD-MCNC: 172 MG/DL (ref 70–99)
HCT VFR BLD AUTO: 26.8 % (ref 40–53)
HGB BLD-MCNC: 9.5 G/DL (ref 13.3–17.7)
MCH RBC QN AUTO: 33.2 PG (ref 26.5–33)
MCHC RBC AUTO-ENTMCNC: 35.4 G/DL (ref 31.5–36.5)
MCV RBC AUTO: 94 FL (ref 78–100)
PLATELET # BLD AUTO: 261 10E3/UL (ref 150–450)
POTASSIUM BLD-SCNC: 5 MMOL/L (ref 3.4–5.3)
PROT SERPL-MCNC: 8 G/DL (ref 6.8–8.8)
RBC # BLD AUTO: 2.86 10E6/UL (ref 4.4–5.9)
SODIUM SERPL-SCNC: 128 MMOL/L (ref 133–144)
WBC # BLD AUTO: 7.4 10E3/UL (ref 4–11)

## 2022-01-20 PROCEDURE — 36415 COLL VENOUS BLD VENIPUNCTURE: CPT

## 2022-01-20 PROCEDURE — 85027 COMPLETE CBC AUTOMATED: CPT

## 2022-01-20 PROCEDURE — 80053 COMPREHEN METABOLIC PANEL: CPT

## 2022-02-10 DIAGNOSIS — E78.00 PURE HYPERCHOLESTEROLEMIA: ICD-10-CM

## 2022-02-11 NOTE — TELEPHONE ENCOUNTER
Pending Prescriptions:                       Disp   Refills    atorvastatin (LIPITOR) 20 MG tablet [Pharm*90 tab*0        Sig: Take 1 tablet (20 mg) by mouth daily    Routing refill request to provider for review/approval because:  Drug interaction warning with ketoconazole.    Amy Livingston RN

## 2022-02-13 RX ORDER — ATORVASTATIN CALCIUM 20 MG/1
20 TABLET, FILM COATED ORAL DAILY
Qty: 90 TABLET | Refills: 0 | Status: SHIPPED | OUTPATIENT
Start: 2022-02-13 | End: 2022-12-26

## 2022-02-14 DIAGNOSIS — K25.9 MULTIPLE GASTRIC ULCERS: ICD-10-CM

## 2022-04-30 ENCOUNTER — HEALTH MAINTENANCE LETTER (OUTPATIENT)
Age: 77
End: 2022-04-30

## 2022-05-11 ENCOUNTER — MEDICAL CORRESPONDENCE (OUTPATIENT)
Dept: HEALTH INFORMATION MANAGEMENT | Facility: CLINIC | Age: 77
End: 2022-05-11
Payer: MEDICARE

## 2022-05-11 ENCOUNTER — TELEPHONE (OUTPATIENT)
Dept: FAMILY MEDICINE | Facility: CLINIC | Age: 77
End: 2022-05-11
Payer: MEDICARE

## 2022-05-11 NOTE — TELEPHONE ENCOUNTER
Forms completed and signed by Dr. Terry. Faxed back on 5/11/2022, sent to scanning.    Richelle Garcia Patient

## 2022-09-23 DIAGNOSIS — K25.9 MULTIPLE GASTRIC ULCERS: ICD-10-CM

## 2022-10-07 DIAGNOSIS — R35.1 BENIGN PROSTATIC HYPERPLASIA WITH NOCTURIA: ICD-10-CM

## 2022-10-07 DIAGNOSIS — N40.1 BENIGN PROSTATIC HYPERPLASIA WITH NOCTURIA: ICD-10-CM

## 2022-10-07 RX ORDER — TAMSULOSIN HYDROCHLORIDE 0.4 MG/1
0.4 CAPSULE ORAL DAILY
Qty: 90 CAPSULE | Refills: 1 | OUTPATIENT
Start: 2022-10-07

## 2022-10-07 NOTE — TELEPHONE ENCOUNTER
Pt stated he is seeing Dr. El Saha over in [place]. The Pharmacy must have made a mistake, Pt stated he will call and let the pharmacy know. '      Dari Rivera RN

## 2022-11-19 ENCOUNTER — HEALTH MAINTENANCE LETTER (OUTPATIENT)
Age: 77
End: 2022-11-19

## 2022-12-26 DIAGNOSIS — E78.00 PURE HYPERCHOLESTEROLEMIA: ICD-10-CM

## 2022-12-26 RX ORDER — ATORVASTATIN CALCIUM 20 MG/1
20 TABLET, FILM COATED ORAL DAILY
Qty: 90 TABLET | Refills: 0 | Status: SHIPPED | OUTPATIENT
Start: 2022-12-26

## 2023-04-09 ENCOUNTER — HEALTH MAINTENANCE LETTER (OUTPATIENT)
Age: 78
End: 2023-04-09

## 2023-05-30 NOTE — TELEPHONE ENCOUNTER
Amlodipine      Last Written Prescription Date: 12/1/2016  Last Fill Quantity: 90, # refills: 3    Last Office Visit with [facility], [facility] or [facility] prescribing provider:  12/16/2016   Future Office Visit:        BP Readings from Last 3 Encounters:   12/16/16 119/68   12/01/16 130/72   10/05/16 150/82       Lisinopril      Last Written Prescription Date: 10/5/2016  Last Fill Quantity: 90, # refills: 3  Last Office Visit with [facility], [facility] or [facility] prescribing provider: 12/16/2016       POTASSIUM   Date Value Ref Range Status   09/28/2016 4.6 3.4 - 5.3 mmol/L Final     CREATININE   Date Value Ref Range Status   09/28/2016 1.06 0.66 - 1.25 mg/dL Final     BP Readings from Last 3 Encounters:   12/16/16 119/68   12/01/16 130/72   10/05/16 150/82        3

## 2023-05-31 DIAGNOSIS — K25.9 MULTIPLE GASTRIC ULCERS: ICD-10-CM

## 2023-09-10 ENCOUNTER — HEALTH MAINTENANCE LETTER (OUTPATIENT)
Age: 78
End: 2023-09-10

## 2024-04-07 ENCOUNTER — HEALTH MAINTENANCE LETTER (OUTPATIENT)
Age: 79
End: 2024-04-07

## 2024-06-16 ENCOUNTER — HEALTH MAINTENANCE LETTER (OUTPATIENT)
Age: 79
End: 2024-06-16

## 2024-11-03 ENCOUNTER — HEALTH MAINTENANCE LETTER (OUTPATIENT)
Age: 79
End: 2024-11-03

## 2025-01-27 ENCOUNTER — OFFICE VISIT (OUTPATIENT)
Dept: URGENT CARE | Facility: URGENT CARE | Age: 80
End: 2025-01-27
Payer: MEDICARE

## 2025-01-27 VITALS
TEMPERATURE: 97.8 F | RESPIRATION RATE: 18 BRPM | HEART RATE: 92 BPM | WEIGHT: 182 LBS | OXYGEN SATURATION: 99 % | SYSTOLIC BLOOD PRESSURE: 158 MMHG | DIASTOLIC BLOOD PRESSURE: 79 MMHG | BODY MASS INDEX: 29.38 KG/M2

## 2025-01-27 DIAGNOSIS — T14.8XXD ENCOUNTER FOR RE-CHECK OF LACERATION WOUND: Primary | ICD-10-CM

## 2025-01-27 NOTE — PROGRESS NOTES
Assessment & Plan     Encounter for re-check of laceration wound  Reassurance, no signs of infection at this time. Would schedule nurse visit for suture removal in 2 days. Keep area clean and dry.                   Return in about 2 days (around 1/29/2025) for Follow up.          Subjective   Chief Complaint   Patient presents with    Infection     Is wondering if stitches on both sides, right, upper side and left lower side, are infected or not. Otherwise if he could get them taken out.          HPI       Derm problem     Patient had 2 biopsies 1 week ago at the [place] and would like sutures checked to make sure there is no infection. He has mild discomfort/itching.                       Objective    BP (!) 158/79   Pulse 92   Temp 97.8  F (36.6  C) (Tympanic)   Resp (S) 18   Wt 82.6 kg (182 lb)   SpO2 99%   BMI 29.38 kg/m    Body mass index is 29.38 kg/m .  Physical Exam  Constitutional:       General: He is not in acute distress.  Skin:     Comments: Biopsy site on right side and left side is healing with mild irritation. No tenderness. No drainage/discharge.    Neurological:      Mental Status: He is alert.                    Signed Electronically by: Bailey Bruce PA-C

## 2025-05-10 ENCOUNTER — HEALTH MAINTENANCE LETTER (OUTPATIENT)
Age: 80
End: 2025-05-10

## 2025-06-09 ENCOUNTER — PATIENT OUTREACH (OUTPATIENT)
Dept: CARE COORDINATION | Facility: CLINIC | Age: 80
End: 2025-06-09
Payer: MEDICARE

## 2025-08-23 ENCOUNTER — HEALTH MAINTENANCE LETTER (OUTPATIENT)
Age: 80
End: 2025-08-23

## (undated) RX ORDER — LIDOCAINE HYDROCHLORIDE 10 MG/ML
INJECTION, SOLUTION EPIDURAL; INFILTRATION; INTRACAUDAL; PERINEURAL
Status: DISPENSED
Start: 2018-03-02

## (undated) RX ORDER — GLYCOPYRROLATE 0.2 MG/ML
INJECTION, SOLUTION INTRAMUSCULAR; INTRAVENOUS
Status: DISPENSED
Start: 2018-03-02